# Patient Record
Sex: MALE | Race: WHITE | Employment: FULL TIME | URBAN - METROPOLITAN AREA
[De-identification: names, ages, dates, MRNs, and addresses within clinical notes are randomized per-mention and may not be internally consistent; named-entity substitution may affect disease eponyms.]

---

## 2019-05-14 ENCOUNTER — HOSPITAL ENCOUNTER (EMERGENCY)
Facility: HOSPITAL | Age: 54
Discharge: HOME | End: 2019-05-14
Attending: EMERGENCY MEDICINE
Payer: COMMERCIAL

## 2019-05-14 ENCOUNTER — APPOINTMENT (OUTPATIENT)
Dept: CT IMAGING | Facility: HOSPITAL | Age: 54
End: 2019-05-14
Attending: EMERGENCY MEDICINE
Payer: COMMERCIAL

## 2019-05-14 VITALS
HEIGHT: 72 IN | RESPIRATION RATE: 16 BRPM | BODY MASS INDEX: 31.83 KG/M2 | TEMPERATURE: 98 F | WEIGHT: 235 LBS | DIASTOLIC BLOOD PRESSURE: 86 MMHG | SYSTOLIC BLOOD PRESSURE: 145 MMHG | OXYGEN SATURATION: 98 % | HEART RATE: 91 BPM

## 2019-05-14 DIAGNOSIS — R73.9 HYPERGLYCEMIA: ICD-10-CM

## 2019-05-14 DIAGNOSIS — N49.2 CELLULITIS OF SCROTUM: Primary | ICD-10-CM

## 2019-05-14 PROCEDURE — 87147 CULTURE TYPE IMMUNOLOGIC: CPT | Performed by: EMERGENCY MEDICINE

## 2019-05-14 PROCEDURE — 96361 HYDRATE IV INFUSION ADD-ON: CPT

## 2019-05-14 PROCEDURE — 99284 EMERGENCY DEPT VISIT MOD MDM: CPT

## 2019-05-14 PROCEDURE — 85025 COMPLETE CBC W/AUTO DIFF WBC: CPT | Performed by: EMERGENCY MEDICINE

## 2019-05-14 PROCEDURE — 87070 CULTURE OTHR SPECIMN AEROBIC: CPT | Performed by: EMERGENCY MEDICINE

## 2019-05-14 PROCEDURE — 72193 CT PELVIS W/DYE: CPT | Performed by: EMERGENCY MEDICINE

## 2019-05-14 PROCEDURE — 80053 COMPREHEN METABOLIC PANEL: CPT | Performed by: EMERGENCY MEDICINE

## 2019-05-14 PROCEDURE — 87205 SMEAR GRAM STAIN: CPT | Performed by: EMERGENCY MEDICINE

## 2019-05-14 PROCEDURE — 87040 BLOOD CULTURE FOR BACTERIA: CPT | Performed by: EMERGENCY MEDICINE

## 2019-05-14 PROCEDURE — 82962 GLUCOSE BLOOD TEST: CPT

## 2019-05-14 PROCEDURE — 96365 THER/PROPH/DIAG IV INF INIT: CPT

## 2019-05-14 RX ORDER — AMOXICILLIN AND CLAVULANATE POTASSIUM 875; 125 MG/1; MG/1
1 TABLET, FILM COATED ORAL 2 TIMES DAILY
Qty: 14 TABLET | Refills: 0 | Status: SHIPPED | OUTPATIENT
Start: 2019-05-14 | End: 2019-05-21

## 2019-05-14 NOTE — ED NOTES
DC instructions handed to pt with Rxs. No distress noted. Pt denies any needs. Pt thanks staff for care. Denies need for wC out of ER   No signs of allergic reaction noted.

## 2019-05-14 NOTE — ED INITIAL ASSESSMENT (HPI)
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 \" Pt denies abnormal penile discharge, denies urinary sxs

## 2019-05-14 NOTE — ED PROVIDER NOTES
Patient Seen in: [facility] Emergency Department    History   Patient presents with:  Eval-G (genital)    Stated Complaint:     HPI    Patient is a 49-year-old male with a history of diabetes, noncompliant with seeing a regular physician or taking m rhythm and normal heart sounds. Pulmonary/Chest: Effort normal and breath sounds normal.   Abdominal: Soft. Bowel sounds are normal.   Genitourinary:   Genitourinary Comments:  There is some mild erythema the right groin and on the right lateral side of t needed, consider ultrasound follow-up. There may be small right hydrocele and there appears to be thickening of the right scrotal skin.    Dictated by: Josse San MD on 5/14/2019 at 8:37     Approved by: Josse San MD                MDM   Patient daily with meals. Do not start taking this medication until Friday morning.   Qty: 60 tablet Refills: 2

## 2019-05-15 ENCOUNTER — TELEPHONE (OUTPATIENT)
Dept: FAMILY MEDICINE CLINIC | Facility: CLINIC | Age: 54
End: 2019-05-15

## 2019-07-24 PROBLEM — Z12.11 SPECIAL SCREENING FOR MALIGNANT NEOPLASM OF COLON: Status: ACTIVE | Noted: 2019-07-24

## 2019-07-25 PROBLEM — K63.5 POLYP OF COLON: Status: ACTIVE | Noted: 2019-07-25

## 2021-11-08 ENCOUNTER — DIABETIC EDUCATION (OUTPATIENT)
Dept: ENDOCRINOLOGY CLINIC | Facility: CLINIC | Age: 56
End: 2021-11-08
Payer: COMMERCIAL

## 2021-11-08 DIAGNOSIS — E11.65 TYPE 2 DIABETES MELLITUS WITH HYPERGLYCEMIA, WITHOUT LONG-TERM CURRENT USE OF INSULIN (HCC): Primary | ICD-10-CM

## 2021-11-08 PROCEDURE — 97802 MEDICAL NUTRITION INDIV IN: CPT | Performed by: DIETITIAN, REGISTERED

## 2021-11-08 NOTE — PROGRESS NOTES
Markus Umana   6/13/1965 was seen for Medical Nutrition Therapy with Diabetes:    11/8/2021  Referring Provider: Dr. Sony Peace  Start time: 3:15 End time: 4:15    Here with wife Aspen Perales. Using Andrew. BG was 292 when he saw pcp in office.     He thinks his self-management goals: reduce portion sizes (already working on this). Patient is willing to make lifestyle changes to improve blood glucose control. Written materials provided for all topics covered.   Patient verbalized understanding and has no furt

## 2023-05-06 ENCOUNTER — HOSPITAL ENCOUNTER (EMERGENCY)
Facility: HOSPITAL | Age: 58
Discharge: HOME | End: 2023-05-06
Attending: STUDENT IN AN ORGANIZED HEALTH CARE EDUCATION/TRAINING PROGRAM
Payer: COMMERCIAL

## 2023-05-06 ENCOUNTER — APPOINTMENT (OUTPATIENT)
Dept: ULTRASOUND IMAGING | Facility: HOSPITAL | Age: 58
End: 2023-05-06
Attending: STUDENT IN AN ORGANIZED HEALTH CARE EDUCATION/TRAINING PROGRAM
Payer: COMMERCIAL

## 2023-05-06 VITALS
OXYGEN SATURATION: 100 % | BODY MASS INDEX: 30.48 KG/M2 | TEMPERATURE: 99 F | DIASTOLIC BLOOD PRESSURE: 81 MMHG | HEART RATE: 78 BPM | HEIGHT: 72 IN | WEIGHT: 225 LBS | SYSTOLIC BLOOD PRESSURE: 147 MMHG | RESPIRATION RATE: 18 BRPM

## 2023-05-06 DIAGNOSIS — L03.116 CELLULITIS OF LEFT LOWER EXTREMITY: Primary | ICD-10-CM

## 2023-05-06 LAB
ALBUMIN SERPL-MCNC: 3.1 G/DL (ref 3.4–5)
ALBUMIN/GLOB SERPL: 0.7 {RATIO} (ref 1–2)
ALP LIVER SERPL-CCNC: 70 U/L
ALT SERPL-CCNC: 34 U/L
ANION GAP SERPL CALC-SCNC: 3 MMOL/L (ref 0–18)
AST SERPL-CCNC: 22 U/L (ref 15–37)
BASOPHILS # BLD AUTO: 0.02 X10(3) UL (ref 0–0.2)
BASOPHILS NFR BLD AUTO: 0.3 %
BILIRUB SERPL-MCNC: 0.7 MG/DL (ref 0.1–2)
BUN BLD-MCNC: 36 MG/DL (ref 7–18)
CALCIUM BLD-MCNC: 8.8 MG/DL (ref 8.5–10.1)
CHLORIDE SERPL-SCNC: 106 MMOL/L (ref 98–112)
CO2 SERPL-SCNC: 27 MMOL/L (ref 21–32)
CREAT BLD-MCNC: 1.61 MG/DL
EOSINOPHIL # BLD AUTO: 0.02 X10(3) UL (ref 0–0.7)
EOSINOPHIL NFR BLD AUTO: 0.3 %
ERYTHROCYTE [DISTWIDTH] IN BLOOD BY AUTOMATED COUNT: 13.1 %
GFR SERPLBLD BASED ON 1.73 SQ M-ARVRAT: 50 ML/MIN/1.73M2 (ref 60–?)
GLOBULIN PLAS-MCNC: 4.3 G/DL (ref 2.8–4.4)
GLUCOSE BLD-MCNC: 180 MG/DL (ref 70–99)
HCT VFR BLD AUTO: 33.7 %
HGB BLD-MCNC: 10.9 G/DL
IMM GRANULOCYTES # BLD AUTO: 0.03 X10(3) UL (ref 0–1)
IMM GRANULOCYTES NFR BLD: 0.4 %
LYMPHOCYTES # BLD AUTO: 0.79 X10(3) UL (ref 1–4)
LYMPHOCYTES NFR BLD AUTO: 11.2 %
MCH RBC QN AUTO: 29.1 PG (ref 26–34)
MCHC RBC AUTO-ENTMCNC: 32.3 G/DL (ref 31–37)
MCV RBC AUTO: 90.1 FL
MONOCYTES # BLD AUTO: 0.34 X10(3) UL (ref 0.1–1)
MONOCYTES NFR BLD AUTO: 4.8 %
NEUTROPHILS # BLD AUTO: 5.84 X10 (3) UL (ref 1.5–7.7)
NEUTROPHILS # BLD AUTO: 5.84 X10(3) UL (ref 1.5–7.7)
NEUTROPHILS NFR BLD AUTO: 83 %
OSMOLALITY SERPL CALC.SUM OF ELEC: 295 MOSM/KG (ref 275–295)
PLATELET # BLD AUTO: 123 10(3)UL (ref 150–450)
POTASSIUM SERPL-SCNC: 4 MMOL/L (ref 3.5–5.1)
PROT SERPL-MCNC: 7.4 G/DL (ref 6.4–8.2)
RBC # BLD AUTO: 3.74 X10(6)UL
SODIUM SERPL-SCNC: 136 MMOL/L (ref 136–145)
WBC # BLD AUTO: 7 X10(3) UL (ref 4–11)

## 2023-05-06 PROCEDURE — 80053 COMPREHEN METABOLIC PANEL: CPT

## 2023-05-06 PROCEDURE — 85025 COMPLETE CBC W/AUTO DIFF WBC: CPT

## 2023-05-06 PROCEDURE — 36415 COLL VENOUS BLD VENIPUNCTURE: CPT

## 2023-05-06 PROCEDURE — 99285 EMERGENCY DEPT VISIT HI MDM: CPT

## 2023-05-06 PROCEDURE — 87040 BLOOD CULTURE FOR BACTERIA: CPT

## 2023-05-06 PROCEDURE — 96365 THER/PROPH/DIAG IV INF INIT: CPT

## 2023-05-06 PROCEDURE — S0077 INJECTION, CLINDAMYCIN PHOSP: HCPCS | Performed by: STUDENT IN AN ORGANIZED HEALTH CARE EDUCATION/TRAINING PROGRAM

## 2023-05-06 PROCEDURE — 93971 EXTREMITY STUDY: CPT | Performed by: STUDENT IN AN ORGANIZED HEALTH CARE EDUCATION/TRAINING PROGRAM

## 2023-05-06 PROCEDURE — 99284 EMERGENCY DEPT VISIT MOD MDM: CPT

## 2023-05-06 RX ORDER — MELOXICAM 15 MG/1
15 TABLET ORAL DAILY
COMMUNITY
Start: 2023-05-01

## 2023-05-06 RX ORDER — VALSARTAN 160 MG/1
320 TABLET ORAL DAILY
COMMUNITY
Start: 2023-03-03

## 2023-05-06 RX ORDER — CLINDAMYCIN HYDROCHLORIDE 150 MG/1
450 CAPSULE ORAL 3 TIMES DAILY
Qty: 30 CAPSULE | Refills: 0 | Status: SHIPPED | OUTPATIENT
Start: 2023-05-06 | End: 2023-05-13

## 2023-05-06 RX ORDER — ONDANSETRON 4 MG/1
2 TABLET, FILM COATED ORAL EVERY 8 HOURS PRN
COMMUNITY
Start: 2023-05-05 | End: 2023-05-08

## 2023-05-06 RX ORDER — ROSUVASTATIN CALCIUM 20 MG/1
20 TABLET, COATED ORAL NIGHTLY
COMMUNITY

## 2023-05-06 RX ORDER — CLINDAMYCIN PHOSPHATE 600 MG/50ML
600 INJECTION INTRAVENOUS ONCE
Status: COMPLETED | OUTPATIENT
Start: 2023-05-06 | End: 2023-05-06

## 2023-05-06 NOTE — ED INITIAL ASSESSMENT (HPI)
Patient arrives to the ED from home with c/o left lower leg cellulitis that he first noticed today. Per patient it has been rapidly getting worse since he first noticed it. Has been sick for the last two days, and \"they weren't able to find anything\".

## 2023-06-23 ENCOUNTER — APPOINTMENT (OUTPATIENT)
Dept: GENERAL RADIOLOGY | Facility: HOSPITAL | Age: 58
End: 2023-06-23
Attending: EMERGENCY MEDICINE
Payer: COMMERCIAL

## 2023-06-23 ENCOUNTER — HOSPITAL ENCOUNTER (INPATIENT)
Facility: HOSPITAL | Age: 58
LOS: 4 days | Discharge: HOME HEALTH | End: 2023-06-27
Attending: EMERGENCY MEDICINE | Admitting: HOSPITALIST
Payer: COMMERCIAL

## 2023-06-23 ENCOUNTER — APPOINTMENT (OUTPATIENT)
Dept: MRI IMAGING | Facility: HOSPITAL | Age: 58
End: 2023-06-23
Attending: HOSPITALIST
Payer: COMMERCIAL

## 2023-06-23 DIAGNOSIS — N18.9 CHRONIC KIDNEY DISEASE, UNSPECIFIED CKD STAGE: ICD-10-CM

## 2023-06-23 DIAGNOSIS — L97.528 DIABETIC ULCER OF TOE OF LEFT FOOT ASSOCIATED WITH TYPE 2 DIABETES MELLITUS, WITH OTHER ULCER SEVERITY (HCC): Primary | ICD-10-CM

## 2023-06-23 DIAGNOSIS — M86.9 OSTEOMYELITIS (HCC): ICD-10-CM

## 2023-06-23 DIAGNOSIS — E11.621 DIABETIC ULCER OF TOE OF LEFT FOOT ASSOCIATED WITH TYPE 2 DIABETES MELLITUS, WITH OTHER ULCER SEVERITY (HCC): Primary | ICD-10-CM

## 2023-06-23 PROBLEM — L97.509 DIABETIC FOOT ULCER (HCC): Status: ACTIVE | Noted: 2023-06-23

## 2023-06-23 LAB
ALBUMIN SERPL-MCNC: 3.2 G/DL (ref 3.4–5)
ALBUMIN/GLOB SERPL: 0.6 {RATIO} (ref 1–2)
ALP LIVER SERPL-CCNC: 96 U/L
ALT SERPL-CCNC: 19 U/L
ANION GAP SERPL CALC-SCNC: 1 MMOL/L (ref 0–18)
AST SERPL-CCNC: 16 U/L (ref 15–37)
BASOPHILS # BLD AUTO: 0.05 X10(3) UL (ref 0–0.2)
BASOPHILS NFR BLD AUTO: 0.6 %
BILIRUB SERPL-MCNC: 0.3 MG/DL (ref 0.1–2)
BUN BLD-MCNC: 43 MG/DL (ref 7–18)
CALCIUM BLD-MCNC: 9.4 MG/DL (ref 8.5–10.1)
CHLORIDE SERPL-SCNC: 112 MMOL/L (ref 98–112)
CO2 SERPL-SCNC: 24 MMOL/L (ref 21–32)
CREAT BLD-MCNC: 1.45 MG/DL
DEPRECATED HBV CORE AB SER IA-ACNC: 159.4 NG/ML
EOSINOPHIL # BLD AUTO: 0.09 X10(3) UL (ref 0–0.7)
EOSINOPHIL NFR BLD AUTO: 1.1 %
ERYTHROCYTE [DISTWIDTH] IN BLOOD BY AUTOMATED COUNT: 13.1 %
FOLATE SERPL-MCNC: 13.6 NG/ML (ref 8.7–?)
GFR SERPLBLD BASED ON 1.73 SQ M-ARVRAT: 56 ML/MIN/1.73M2 (ref 60–?)
GLOBULIN PLAS-MCNC: 5.2 G/DL (ref 2.8–4.4)
GLUCOSE BLD-MCNC: 101 MG/DL (ref 70–99)
GLUCOSE BLD-MCNC: 96 MG/DL (ref 70–99)
HCT VFR BLD AUTO: 35 %
HGB BLD-MCNC: 11 G/DL
IMM GRANULOCYTES # BLD AUTO: 0.03 X10(3) UL (ref 0–1)
IMM GRANULOCYTES NFR BLD: 0.4 %
IRON SATN MFR SERPL: 12 %
IRON SERPL-MCNC: 28 UG/DL
LACTATE SERPL-SCNC: 0.7 MMOL/L (ref 0.4–2)
LYMPHOCYTES # BLD AUTO: 1.68 X10(3) UL (ref 1–4)
LYMPHOCYTES NFR BLD AUTO: 20.2 %
MCH RBC QN AUTO: 28.1 PG (ref 26–34)
MCHC RBC AUTO-ENTMCNC: 31.4 G/DL (ref 31–37)
MCV RBC AUTO: 89.5 FL
MONOCYTES # BLD AUTO: 0.51 X10(3) UL (ref 0.1–1)
MONOCYTES NFR BLD AUTO: 6.1 %
NEUTROPHILS # BLD AUTO: 5.94 X10 (3) UL (ref 1.5–7.7)
NEUTROPHILS # BLD AUTO: 5.94 X10(3) UL (ref 1.5–7.7)
NEUTROPHILS NFR BLD AUTO: 71.6 %
OSMOLALITY SERPL CALC.SUM OF ELEC: 295 MOSM/KG (ref 275–295)
PLATELET # BLD AUTO: 179 10(3)UL (ref 150–450)
POTASSIUM SERPL-SCNC: 4.7 MMOL/L (ref 3.5–5.1)
PROT SERPL-MCNC: 8.4 G/DL (ref 6.4–8.2)
RBC # BLD AUTO: 3.91 X10(6)UL
SODIUM SERPL-SCNC: 137 MMOL/L (ref 136–145)
TIBC SERPL-MCNC: 235 UG/DL (ref 240–450)
TRANSFERRIN SERPL-MCNC: 158 MG/DL (ref 200–360)
VIT B12 SERPL-MCNC: 263 PG/ML (ref 193–986)
WBC # BLD AUTO: 8.3 X10(3) UL (ref 4–11)

## 2023-06-23 PROCEDURE — 99223 1ST HOSP IP/OBS HIGH 75: CPT | Performed by: HOSPITALIST

## 2023-06-23 PROCEDURE — 73720 MRI LWR EXTREMITY W/O&W/DYE: CPT | Performed by: HOSPITALIST

## 2023-06-23 PROCEDURE — 73630 X-RAY EXAM OF FOOT: CPT | Performed by: EMERGENCY MEDICINE

## 2023-06-23 RX ORDER — HYDROCODONE BITARTRATE AND ACETAMINOPHEN 5; 325 MG/1; MG/1
1 TABLET ORAL EVERY 4 HOURS PRN
Status: DISCONTINUED | OUTPATIENT
Start: 2023-06-23 | End: 2023-06-27

## 2023-06-23 RX ORDER — MELATONIN
3 NIGHTLY PRN
Status: DISCONTINUED | OUTPATIENT
Start: 2023-06-23 | End: 2023-06-27

## 2023-06-23 RX ORDER — ECHINACEA PURPUREA EXTRACT 125 MG
1 TABLET ORAL
Status: DISCONTINUED | OUTPATIENT
Start: 2023-06-23 | End: 2023-06-27

## 2023-06-23 RX ORDER — NICOTINE POLACRILEX 4 MG
15 LOZENGE BUCCAL
Status: DISCONTINUED | OUTPATIENT
Start: 2023-06-23 | End: 2023-06-27

## 2023-06-23 RX ORDER — SENNOSIDES 8.6 MG
17.2 TABLET ORAL NIGHTLY PRN
Status: DISCONTINUED | OUTPATIENT
Start: 2023-06-23 | End: 2023-06-27

## 2023-06-23 RX ORDER — POLYETHYLENE GLYCOL 3350 17 G/17G
17 POWDER, FOR SOLUTION ORAL DAILY PRN
Status: DISCONTINUED | OUTPATIENT
Start: 2023-06-23 | End: 2023-06-27

## 2023-06-23 RX ORDER — SODIUM CHLORIDE 9 MG/ML
INJECTION, SOLUTION INTRAVENOUS CONTINUOUS
Status: DISCONTINUED | OUTPATIENT
Start: 2023-06-23 | End: 2023-06-27

## 2023-06-23 RX ORDER — BENZONATATE 200 MG/1
200 CAPSULE ORAL 3 TIMES DAILY PRN
Status: DISCONTINUED | OUTPATIENT
Start: 2023-06-23 | End: 2023-06-27

## 2023-06-23 RX ORDER — ACETAMINOPHEN 500 MG
500 TABLET ORAL EVERY 4 HOURS PRN
Status: DISCONTINUED | OUTPATIENT
Start: 2023-06-23 | End: 2023-06-27

## 2023-06-23 RX ORDER — HEPARIN SODIUM 5000 [USP'U]/ML
5000 INJECTION, SOLUTION INTRAVENOUS; SUBCUTANEOUS EVERY 12 HOURS SCHEDULED
Status: DISCONTINUED | OUTPATIENT
Start: 2023-06-24 | End: 2023-06-26

## 2023-06-23 RX ORDER — ACETAMINOPHEN 325 MG/1
650 TABLET ORAL EVERY 4 HOURS PRN
Status: DISCONTINUED | OUTPATIENT
Start: 2023-06-23 | End: 2023-06-27

## 2023-06-23 RX ORDER — NICOTINE POLACRILEX 4 MG
30 LOZENGE BUCCAL
Status: DISCONTINUED | OUTPATIENT
Start: 2023-06-23 | End: 2023-06-27

## 2023-06-23 RX ORDER — HYDROCODONE BITARTRATE AND ACETAMINOPHEN 5; 325 MG/1; MG/1
2 TABLET ORAL EVERY 4 HOURS PRN
Status: DISCONTINUED | OUTPATIENT
Start: 2023-06-23 | End: 2023-06-27

## 2023-06-23 RX ORDER — ONDANSETRON 2 MG/ML
8 INJECTION INTRAMUSCULAR; INTRAVENOUS EVERY 6 HOURS PRN
Status: DISCONTINUED | OUTPATIENT
Start: 2023-06-23 | End: 2023-06-27

## 2023-06-23 RX ORDER — BISACODYL 10 MG
10 SUPPOSITORY, RECTAL RECTAL
Status: DISCONTINUED | OUTPATIENT
Start: 2023-06-23 | End: 2023-06-27

## 2023-06-23 RX ORDER — GADOTERATE MEGLUMINE 376.9 MG/ML
20 INJECTION INTRAVENOUS
Status: COMPLETED | OUTPATIENT
Start: 2023-06-23 | End: 2023-06-23

## 2023-06-23 RX ORDER — ROSUVASTATIN CALCIUM 5 MG/1
20 TABLET, COATED ORAL NIGHTLY
Status: DISCONTINUED | OUTPATIENT
Start: 2023-06-23 | End: 2023-06-27

## 2023-06-23 RX ORDER — DEXTROSE MONOHYDRATE 25 G/50ML
50 INJECTION, SOLUTION INTRAVENOUS
Status: DISCONTINUED | OUTPATIENT
Start: 2023-06-23 | End: 2023-06-27

## 2023-06-24 LAB
ANION GAP SERPL CALC-SCNC: 3 MMOL/L (ref 0–18)
BUN BLD-MCNC: 29 MG/DL (ref 7–18)
CALCIUM BLD-MCNC: 8.4 MG/DL (ref 8.5–10.1)
CHLORIDE SERPL-SCNC: 116 MMOL/L (ref 98–112)
CO2 SERPL-SCNC: 23 MMOL/L (ref 21–32)
CREAT BLD-MCNC: 1.12 MG/DL
ERYTHROCYTE [DISTWIDTH] IN BLOOD BY AUTOMATED COUNT: 13.1 %
EST. AVERAGE GLUCOSE BLD GHB EST-MCNC: 174 MG/DL (ref 68–126)
GFR SERPLBLD BASED ON 1.73 SQ M-ARVRAT: 76 ML/MIN/1.73M2 (ref 60–?)
GLUCOSE BLD-MCNC: 103 MG/DL (ref 70–99)
GLUCOSE BLD-MCNC: 113 MG/DL (ref 70–99)
GLUCOSE BLD-MCNC: 115 MG/DL (ref 70–99)
GLUCOSE BLD-MCNC: 137 MG/DL (ref 70–99)
GLUCOSE BLD-MCNC: 79 MG/DL (ref 70–99)
HBA1C MFR BLD: 7.7 % (ref ?–5.7)
HCT VFR BLD AUTO: 30.2 %
HGB BLD-MCNC: 9.4 G/DL
MAGNESIUM SERPL-MCNC: 2.6 MG/DL (ref 1.6–2.6)
MCH RBC QN AUTO: 28.3 PG (ref 26–34)
MCHC RBC AUTO-ENTMCNC: 31.1 G/DL (ref 31–37)
MCV RBC AUTO: 91 FL
OSMOLALITY SERPL CALC.SUM OF ELEC: 301 MOSM/KG (ref 275–295)
PLATELET # BLD AUTO: 148 10(3)UL (ref 150–450)
POTASSIUM SERPL-SCNC: 4.5 MMOL/L (ref 3.5–5.1)
RBC # BLD AUTO: 3.32 X10(6)UL
SODIUM SERPL-SCNC: 142 MMOL/L (ref 136–145)
WBC # BLD AUTO: 6.4 X10(3) UL (ref 4–11)

## 2023-06-24 PROCEDURE — 99233 SBSQ HOSP IP/OBS HIGH 50: CPT | Performed by: HOSPITALIST

## 2023-06-24 RX ORDER — LOSARTAN POTASSIUM 100 MG/1
100 TABLET ORAL DAILY
Status: DISCONTINUED | OUTPATIENT
Start: 2023-06-24 | End: 2023-06-27

## 2023-06-25 ENCOUNTER — APPOINTMENT (OUTPATIENT)
Dept: ULTRASOUND IMAGING | Facility: HOSPITAL | Age: 58
End: 2023-06-25
Attending: HOSPITALIST
Payer: COMMERCIAL

## 2023-06-25 ENCOUNTER — APPOINTMENT (OUTPATIENT)
Dept: CV DIAGNOSTICS | Facility: HOSPITAL | Age: 58
End: 2023-06-25
Attending: INTERNAL MEDICINE
Payer: COMMERCIAL

## 2023-06-25 ENCOUNTER — ANESTHESIA EVENT (OUTPATIENT)
Dept: SURGERY | Facility: HOSPITAL | Age: 58
End: 2023-06-25
Payer: COMMERCIAL

## 2023-06-25 LAB
ANION GAP SERPL CALC-SCNC: 6 MMOL/L (ref 0–18)
BUN BLD-MCNC: 19 MG/DL (ref 7–18)
CALCIUM BLD-MCNC: 8.2 MG/DL (ref 8.5–10.1)
CHLORIDE SERPL-SCNC: 114 MMOL/L (ref 98–112)
CO2 SERPL-SCNC: 23 MMOL/L (ref 21–32)
CREAT BLD-MCNC: 0.87 MG/DL
ERYTHROCYTE [DISTWIDTH] IN BLOOD BY AUTOMATED COUNT: 12.8 %
GFR SERPLBLD BASED ON 1.73 SQ M-ARVRAT: 100 ML/MIN/1.73M2 (ref 60–?)
GLUCOSE BLD-MCNC: 124 MG/DL (ref 70–99)
GLUCOSE BLD-MCNC: 153 MG/DL (ref 70–99)
GLUCOSE BLD-MCNC: 79 MG/DL (ref 70–99)
GLUCOSE BLD-MCNC: 88 MG/DL (ref 70–99)
GLUCOSE BLD-MCNC: 96 MG/DL (ref 70–99)
HCT VFR BLD AUTO: 30.2 %
HGB BLD-MCNC: 9.3 G/DL
MCH RBC QN AUTO: 28 PG (ref 26–34)
MCHC RBC AUTO-ENTMCNC: 30.8 G/DL (ref 31–37)
MCV RBC AUTO: 91 FL
OSMOLALITY SERPL CALC.SUM OF ELEC: 297 MOSM/KG (ref 275–295)
PLATELET # BLD AUTO: 157 10(3)UL (ref 150–450)
POTASSIUM SERPL-SCNC: 4.5 MMOL/L (ref 3.5–5.1)
RBC # BLD AUTO: 3.32 X10(6)UL
SODIUM SERPL-SCNC: 143 MMOL/L (ref 136–145)
WBC # BLD AUTO: 5.9 X10(3) UL (ref 4–11)

## 2023-06-25 PROCEDURE — 28820 AMPUTATION OF TOE: CPT | Performed by: PODIATRIST

## 2023-06-25 PROCEDURE — 93306 TTE W/DOPPLER COMPLETE: CPT | Performed by: INTERNAL MEDICINE

## 2023-06-25 PROCEDURE — 99233 SBSQ HOSP IP/OBS HIGH 50: CPT | Performed by: HOSPITALIST

## 2023-06-25 PROCEDURE — 93926 LOWER EXTREMITY STUDY: CPT | Performed by: HOSPITALIST

## 2023-06-26 ENCOUNTER — ANESTHESIA (OUTPATIENT)
Dept: SURGERY | Facility: HOSPITAL | Age: 58
End: 2023-06-26
Payer: COMMERCIAL

## 2023-06-26 ENCOUNTER — APPOINTMENT (OUTPATIENT)
Dept: GENERAL RADIOLOGY | Facility: HOSPITAL | Age: 58
End: 2023-06-26
Attending: PODIATRIST
Payer: COMMERCIAL

## 2023-06-26 LAB
GLUCOSE BLD-MCNC: 102 MG/DL (ref 70–99)
GLUCOSE BLD-MCNC: 80 MG/DL (ref 70–99)
GLUCOSE BLD-MCNC: 90 MG/DL (ref 70–99)
GLUCOSE BLD-MCNC: 91 MG/DL (ref 70–99)
GLUCOSE BLD-MCNC: 99 MG/DL (ref 70–99)

## 2023-06-26 PROCEDURE — 76000 FLUOROSCOPY <1 HR PHYS/QHP: CPT | Performed by: PODIATRIST

## 2023-06-26 PROCEDURE — 02HV33Z INSERTION OF INFUSION DEVICE INTO SUPERIOR VENA CAVA, PERCUTANEOUS APPROACH: ICD-10-PCS | Performed by: INTERNAL MEDICINE

## 2023-06-26 PROCEDURE — 99233 SBSQ HOSP IP/OBS HIGH 50: CPT | Performed by: HOSPITALIST

## 2023-06-26 PROCEDURE — 0Y6S0Z0 DETACHMENT AT LEFT 2ND TOE, COMPLETE, OPEN APPROACH: ICD-10-PCS | Performed by: PODIATRIST

## 2023-06-26 RX ORDER — CEFAZOLIN SODIUM 1 G/3ML
INJECTION, POWDER, FOR SOLUTION INTRAMUSCULAR; INTRAVENOUS AS NEEDED
Status: DISCONTINUED | OUTPATIENT
Start: 2023-06-26 | End: 2023-06-26 | Stop reason: SURG

## 2023-06-26 RX ORDER — HYDROMORPHONE HYDROCHLORIDE 1 MG/ML
0.2 INJECTION, SOLUTION INTRAMUSCULAR; INTRAVENOUS; SUBCUTANEOUS EVERY 5 MIN PRN
Status: DISCONTINUED | OUTPATIENT
Start: 2023-06-26 | End: 2023-06-26 | Stop reason: HOSPADM

## 2023-06-26 RX ORDER — SODIUM CHLORIDE, SODIUM LACTATE, POTASSIUM CHLORIDE, CALCIUM CHLORIDE 600; 310; 30; 20 MG/100ML; MG/100ML; MG/100ML; MG/100ML
INJECTION, SOLUTION INTRAVENOUS CONTINUOUS
Status: DISCONTINUED | OUTPATIENT
Start: 2023-06-26 | End: 2023-06-26 | Stop reason: HOSPADM

## 2023-06-26 RX ORDER — ACETAMINOPHEN 500 MG
1000 TABLET ORAL ONCE AS NEEDED
Status: DISCONTINUED | OUTPATIENT
Start: 2023-06-26 | End: 2023-06-26 | Stop reason: HOSPADM

## 2023-06-26 RX ORDER — HYDROMORPHONE HYDROCHLORIDE 1 MG/ML
0.6 INJECTION, SOLUTION INTRAMUSCULAR; INTRAVENOUS; SUBCUTANEOUS EVERY 5 MIN PRN
Status: DISCONTINUED | OUTPATIENT
Start: 2023-06-26 | End: 2023-06-26 | Stop reason: HOSPADM

## 2023-06-26 RX ORDER — BUPIVACAINE HYDROCHLORIDE 5 MG/ML
INJECTION, SOLUTION EPIDURAL; INTRACAUDAL AS NEEDED
Status: DISCONTINUED | OUTPATIENT
Start: 2023-06-26 | End: 2023-06-26 | Stop reason: HOSPADM

## 2023-06-26 RX ORDER — SODIUM CHLORIDE, SODIUM LACTATE, POTASSIUM CHLORIDE, CALCIUM CHLORIDE 600; 310; 30; 20 MG/100ML; MG/100ML; MG/100ML; MG/100ML
INJECTION, SOLUTION INTRAVENOUS CONTINUOUS PRN
Status: DISCONTINUED | OUTPATIENT
Start: 2023-06-26 | End: 2023-06-26 | Stop reason: SURG

## 2023-06-26 RX ORDER — ONDANSETRON 2 MG/ML
4 INJECTION INTRAMUSCULAR; INTRAVENOUS EVERY 6 HOURS PRN
Status: DISCONTINUED | OUTPATIENT
Start: 2023-06-26 | End: 2023-06-26 | Stop reason: HOSPADM

## 2023-06-26 RX ORDER — INSULIN ASPART 100 [IU]/ML
INJECTION, SOLUTION INTRAVENOUS; SUBCUTANEOUS ONCE
Status: DISCONTINUED | OUTPATIENT
Start: 2023-06-26 | End: 2023-06-26 | Stop reason: HOSPADM

## 2023-06-26 RX ORDER — HYDROCODONE BITARTRATE AND ACETAMINOPHEN 5; 325 MG/1; MG/1
1 TABLET ORAL ONCE AS NEEDED
Status: DISCONTINUED | OUTPATIENT
Start: 2023-06-26 | End: 2023-06-26 | Stop reason: HOSPADM

## 2023-06-26 RX ORDER — HYDROMORPHONE HYDROCHLORIDE 1 MG/ML
0.4 INJECTION, SOLUTION INTRAMUSCULAR; INTRAVENOUS; SUBCUTANEOUS EVERY 5 MIN PRN
Status: DISCONTINUED | OUTPATIENT
Start: 2023-06-26 | End: 2023-06-26 | Stop reason: HOSPADM

## 2023-06-26 RX ORDER — PROCHLORPERAZINE EDISYLATE 5 MG/ML
5 INJECTION INTRAMUSCULAR; INTRAVENOUS EVERY 8 HOURS PRN
Status: DISCONTINUED | OUTPATIENT
Start: 2023-06-26 | End: 2023-06-26 | Stop reason: HOSPADM

## 2023-06-26 RX ORDER — NALOXONE HYDROCHLORIDE 0.4 MG/ML
80 INJECTION, SOLUTION INTRAMUSCULAR; INTRAVENOUS; SUBCUTANEOUS AS NEEDED
Status: DISCONTINUED | OUTPATIENT
Start: 2023-06-26 | End: 2023-06-26 | Stop reason: HOSPADM

## 2023-06-26 RX ORDER — MIDAZOLAM HYDROCHLORIDE 1 MG/ML
INJECTION INTRAMUSCULAR; INTRAVENOUS AS NEEDED
Status: DISCONTINUED | OUTPATIENT
Start: 2023-06-26 | End: 2023-06-26 | Stop reason: SURG

## 2023-06-26 RX ORDER — LIDOCAINE HYDROCHLORIDE 10 MG/ML
INJECTION, SOLUTION EPIDURAL; INFILTRATION; INTRACAUDAL; PERINEURAL AS NEEDED
Status: DISCONTINUED | OUTPATIENT
Start: 2023-06-26 | End: 2023-06-26 | Stop reason: SURG

## 2023-06-26 RX ORDER — HYDROCODONE BITARTRATE AND ACETAMINOPHEN 5; 325 MG/1; MG/1
2 TABLET ORAL ONCE AS NEEDED
Status: DISCONTINUED | OUTPATIENT
Start: 2023-06-26 | End: 2023-06-26 | Stop reason: HOSPADM

## 2023-06-26 RX ORDER — HYDRALAZINE HYDROCHLORIDE 20 MG/ML
10 INJECTION INTRAMUSCULAR; INTRAVENOUS EVERY 6 HOURS PRN
Status: DISCONTINUED | OUTPATIENT
Start: 2023-06-26 | End: 2023-06-27

## 2023-06-26 RX ADMIN — CEFAZOLIN SODIUM 2 G: 1 INJECTION, POWDER, FOR SOLUTION INTRAMUSCULAR; INTRAVENOUS at 12:08:00

## 2023-06-26 RX ADMIN — SODIUM CHLORIDE, SODIUM LACTATE, POTASSIUM CHLORIDE, CALCIUM CHLORIDE: 600; 310; 30; 20 INJECTION, SOLUTION INTRAVENOUS at 12:08:00

## 2023-06-26 RX ADMIN — MIDAZOLAM HYDROCHLORIDE 2 MG: 1 INJECTION INTRAMUSCULAR; INTRAVENOUS at 12:08:00

## 2023-06-26 RX ADMIN — LIDOCAINE HYDROCHLORIDE 50 MG: 10 INJECTION, SOLUTION EPIDURAL; INFILTRATION; INTRACAUDAL; PERINEURAL at 12:12:00

## 2023-06-27 ENCOUNTER — TELEPHONE (OUTPATIENT)
Dept: PODIATRY CLINIC | Facility: CLINIC | Age: 58
End: 2023-06-27

## 2023-06-27 VITALS
SYSTOLIC BLOOD PRESSURE: 201 MMHG | RESPIRATION RATE: 18 BRPM | HEART RATE: 88 BPM | DIASTOLIC BLOOD PRESSURE: 93 MMHG | BODY MASS INDEX: 31 KG/M2 | OXYGEN SATURATION: 95 % | WEIGHT: 226 LBS | TEMPERATURE: 98 F

## 2023-06-27 PROBLEM — L97.528 DIABETIC ULCER OF TOE OF LEFT FOOT ASSOCIATED WITH TYPE 2 DIABETES MELLITUS, WITH OTHER ULCER SEVERITY (HCC): Status: RESOLVED | Noted: 2023-06-23 | Resolved: 2023-06-27

## 2023-06-27 PROBLEM — E11.621 DIABETIC ULCER OF TOE OF LEFT FOOT ASSOCIATED WITH TYPE 2 DIABETES MELLITUS, WITH OTHER ULCER SEVERITY (HCC): Status: RESOLVED | Noted: 2023-06-23 | Resolved: 2023-06-27

## 2023-06-27 LAB
ANION GAP SERPL CALC-SCNC: 5 MMOL/L (ref 0–18)
BUN BLD-MCNC: 13 MG/DL (ref 7–18)
CALCIUM BLD-MCNC: 8.4 MG/DL (ref 8.5–10.1)
CHLORIDE SERPL-SCNC: 113 MMOL/L (ref 98–112)
CO2 SERPL-SCNC: 23 MMOL/L (ref 21–32)
CREAT BLD-MCNC: 0.83 MG/DL
ERYTHROCYTE [DISTWIDTH] IN BLOOD BY AUTOMATED COUNT: 12.6 %
GFR SERPLBLD BASED ON 1.73 SQ M-ARVRAT: 101 ML/MIN/1.73M2 (ref 60–?)
GLUCOSE BLD-MCNC: 146 MG/DL (ref 70–99)
GLUCOSE BLD-MCNC: 84 MG/DL (ref 70–99)
GLUCOSE BLD-MCNC: 85 MG/DL (ref 70–99)
GLUCOSE BLD-MCNC: 97 MG/DL (ref 70–99)
GP B STREP DNA BLD POS QL NAA+NON-PROBE: DETECTED
HCT VFR BLD AUTO: 30 %
HGB BLD-MCNC: 9.6 G/DL
MCH RBC QN AUTO: 28.4 PG (ref 26–34)
MCHC RBC AUTO-ENTMCNC: 32 G/DL (ref 31–37)
MCV RBC AUTO: 88.8 FL
OSMOLALITY SERPL CALC.SUM OF ELEC: 291 MOSM/KG (ref 275–295)
PLATELET # BLD AUTO: 168 10(3)UL (ref 150–450)
POTASSIUM SERPL-SCNC: 4.1 MMOL/L (ref 3.5–5.1)
RBC # BLD AUTO: 3.38 X10(6)UL
SODIUM SERPL-SCNC: 141 MMOL/L (ref 136–145)
WBC # BLD AUTO: 6.9 X10(3) UL (ref 4–11)

## 2023-06-27 PROCEDURE — 99239 HOSP IP/OBS DSCHRG MGMT >30: CPT | Performed by: INTERNAL MEDICINE

## 2023-06-27 RX ORDER — CEFTRIAXONE SODIUM 2 G/50ML
2 INJECTION, SOLUTION INTRAVENOUS EVERY 24 HOURS
Qty: 650 ML | Refills: 0 | Status: SHIPPED | OUTPATIENT
Start: 2023-06-27 | End: 2023-07-10

## 2023-06-27 RX ORDER — AMLODIPINE BESYLATE 10 MG/1
10 TABLET ORAL DAILY
Qty: 30 TABLET | Refills: 0 | Status: SHIPPED | OUTPATIENT
Start: 2023-06-28 | End: 2023-07-28

## 2023-06-27 RX ORDER — LABETALOL HYDROCHLORIDE 5 MG/ML
10 INJECTION, SOLUTION INTRAVENOUS ONCE
Status: COMPLETED | OUTPATIENT
Start: 2023-06-27 | End: 2023-06-27

## 2023-06-27 RX ORDER — METRONIDAZOLE 500 MG/1
500 TABLET ORAL 3 TIMES DAILY
Qty: 12 TABLET | Refills: 0 | Status: SHIPPED | OUTPATIENT
Start: 2023-06-27 | End: 2023-07-01

## 2023-06-27 RX ORDER — AMLODIPINE BESYLATE 5 MG/1
10 TABLET ORAL DAILY
Qty: 30 TABLET | Refills: 0 | Status: SHIPPED | OUTPATIENT
Start: 2023-06-28 | End: 2023-06-27

## 2023-06-27 RX ORDER — AMLODIPINE BESYLATE 5 MG/1
5 TABLET ORAL ONCE
Status: COMPLETED | OUTPATIENT
Start: 2023-06-27 | End: 2023-06-27

## 2023-06-27 RX ORDER — AMLODIPINE BESYLATE 5 MG/1
5 TABLET ORAL DAILY
Status: DISCONTINUED | OUTPATIENT
Start: 2023-06-27 | End: 2023-06-27

## 2023-07-02 ENCOUNTER — TELEPHONE (OUTPATIENT)
Dept: PODIATRY CLINIC | Facility: CLINIC | Age: 58
End: 2023-07-02

## 2023-07-02 NOTE — TELEPHONE ENCOUNTER
Fmla/rtw received in forms dept. Logged for processing. Sent [employer] message for missing auth.

## 2023-07-03 ENCOUNTER — OFFICE VISIT (OUTPATIENT)
Dept: PODIATRY CLINIC | Facility: CLINIC | Age: 58
End: 2023-07-03

## 2023-07-03 DIAGNOSIS — Z98.890 STATUS POST FOOT SURGERY: Primary | ICD-10-CM

## 2023-07-03 PROCEDURE — 99212 OFFICE O/P EST SF 10 MIN: CPT | Performed by: PODIATRIST

## 2023-07-06 NOTE — TELEPHONE ENCOUNTER
Spoke with pt's spouse Saumya [place]ung[place] (ok per verbal). Spouse advised forms were received in the forms dept. Spouse advised the forms are processed in the order they are received. Spouse given turnaround time of 7-15 business day. Spouse verbalized understanding.

## 2023-07-10 ENCOUNTER — OFFICE VISIT (OUTPATIENT)
Dept: PODIATRY CLINIC | Facility: CLINIC | Age: 58
End: 2023-07-10

## 2023-07-10 DIAGNOSIS — Z98.890 STATUS POST FOOT SURGERY: Primary | ICD-10-CM

## 2023-07-10 PROCEDURE — 99212 OFFICE O/P EST SF 10 MIN: CPT | Performed by: PODIATRIST

## 2023-07-11 NOTE — TELEPHONE ENCOUNTER
Dr. Gabriela Perera,      Please sign off on 2 forms if you agree to: FMLA and short term disability due to pt's left toe amputation, 6/23/23- pending re eval 7/17/23  (Please place your signature on the first page only)    -From your Inbasket, Highlight the patient and click Chart   -Double click the 0/4/92 Forms Completion telephone encounter  -Edin Montalvo down to the Media section   -Click the blue Hyperlink: Dillan Perera 7/11/23, MARI Perera [birth year]  -Click Acknowledge located at the bottom right corner (if you do not see acknowledge, try maximizing your window)   -Drag the mouse into the blank space of the document and a + sign will appear. Left click to   electronically sign the document.      Thank you,    Evelyn Jeronimo

## 2023-07-12 NOTE — TELEPHONE ENCOUNTER
MARI completed and faxed to [address] [place] [address] [phone]  Disab completed and faxed to [address].  Sent pt [employer] message

## 2023-07-12 NOTE — PROGRESS NOTES
Jessica Kumar is a 62year old male. Patient presents with:  Post-Op: Post op left foot- Sx 6/26. Patient denies any pain, numbness or tingling. HPI:   This patient is now 2 weeks status post surgery doing well has no complaints. At today's visit reviewed nurse's history as taken above, allergies medications and medical history as documented below. All changes duly noted  Allergies: Patient has no known allergies. Current Outpatient Medications   Medication Sig Dispense Refill    amLODIPine 10 MG Oral Tab Take 1 tablet (10 mg total) by mouth daily. 30 tablet 0    dapagliflozin (FARXIGA) 10 MG Oral Tab Take 15 mg by mouth daily. Meloxicam 15 MG Oral Tab Take 1 tablet (15 mg total) by mouth daily. valsartan 160 MG Oral Tab Take 2 tablets (320 mg total) by mouth daily. rosuvastatin 20 MG Oral Tab Take 1 tablet (20 mg total) by mouth nightly. PEG 3350-KCl-NaBcb-NaCl-NaSulf (PEG 3350/ELECTROLYTES) 240 g Oral Recon Soln Take as directed by your physician. 1 Bottle 0      Past Medical History:   Diagnosis Date    Abdominal hernia     High cholesterol     Other and unspecified hyperlipidemia     Type II or unspecified type diabetes mellitus without mention of complication, not stated as uncontrolled     Unspecified sleep apnea       Past Surgical History:   Procedure Laterality Date    HERNIA SURGERY      SHOULDER SURG PROC UNLISTED        History reviewed. No pertinent family history. Social History    Socioeconomic History      Marital status:     Tobacco Use      Smoking status: Former        Types: Cigars      Smokeless tobacco: Never    Vaping Use      Vaping Use: Never used    Substance and Sexual Activity      Alcohol use:  Yes        Alcohol/week: 2.0 standard drinks of alcohol        Types: 2 Cans of beer per week      Drug use: No          REVIEW OF SYSTEMS:   Today reviewed systens as documented below  GENERAL HEALTH: feels well otherwise  SKIN: Refer to exam below  RESPIRATORY: denies shortness of breath with exertion  CARDIOVASCULAR: denies chest pain on exertion  GI: denies abdominal pain and denies heartburn  NEURO: denies headaches    EXAM:   There were no vitals taken for this visit. GENERAL: well developed, well nourished, in no apparent distress  EXTREMITIES:   Dressing change shows incision line well coapted no sign of infection noted. ASSESSMENT AND PLAN:   Diagnoses and all orders for this visit:    Status post foot surgery        Plan: Aseptic dressing reapplied again with mild compression follow-up in 1 week at which time we will remove sutures and reevaluate the wound. May also be able to get into an athletic shoe and we will prescribe diabetic shoes for him. He will continue with all previous instructions keep the dressing clean and dry stay off the foot keep it elevated. Continue to stay off work. The patient indicates understanding of these issues and agrees to the plan.     Hong Davsi DPM

## 2023-07-13 NOTE — TELEPHONE ENCOUNTER
Disab received in forms dept. Logged for processing. Valid [place] auth received with forms signed 7/7/23.

## 2023-07-17 ENCOUNTER — OFFICE VISIT (OUTPATIENT)
Dept: PODIATRY CLINIC | Facility: CLINIC | Age: 58
End: 2023-07-17

## 2023-07-17 DIAGNOSIS — E11.42 DIABETIC PERIPHERAL NEUROPATHY (HCC): ICD-10-CM

## 2023-07-17 DIAGNOSIS — M20.42 HAMMER TOES OF BOTH FEET: ICD-10-CM

## 2023-07-17 DIAGNOSIS — Z98.890 STATUS POST FOOT SURGERY: Primary | ICD-10-CM

## 2023-07-17 DIAGNOSIS — S98.132A AMPUTATION OF TOE OF LEFT FOOT (HCC): ICD-10-CM

## 2023-07-17 DIAGNOSIS — M20.41 HAMMER TOES OF BOTH FEET: ICD-10-CM

## 2023-07-17 NOTE — PROGRESS NOTES
Serafin Lainez is a 62year old male. Patient presents with:  Post-Op: Post op: sx 6/26-  Pt denies any pain   HA1c: 7.7 on 6/23. No FBS recorded this morning        HPI:   This pleasant gentleman returns to the clinic now 3 weeks after surgery is not having any pain. At today's visit reviewed nurse's history as taken above, allergies medications and medical history as documented below. All changes duly noted  Allergies: Patient has no known allergies. Current Outpatient Medications   Medication Sig Dispense Refill    amLODIPine 10 MG Oral Tab Take 1 tablet (10 mg total) by mouth daily. 30 tablet 0    dapagliflozin (FARXIGA) 10 MG Oral Tab Take 15 mg by mouth daily. Meloxicam 15 MG Oral Tab Take 1 tablet (15 mg total) by mouth daily. valsartan 160 MG Oral Tab Take 2 tablets (320 mg total) by mouth daily. rosuvastatin 20 MG Oral Tab Take 1 tablet (20 mg total) by mouth nightly. PEG 3350-KCl-NaBcb-NaCl-NaSulf (PEG 3350/ELECTROLYTES) 240 g Oral Recon Soln Take as directed by your physician. 1 Bottle 0      Past Medical History:   Diagnosis Date    Abdominal hernia     High cholesterol     Other and unspecified hyperlipidemia     Type II or unspecified type diabetes mellitus without mention of complication, not stated as uncontrolled     Unspecified sleep apnea       Past Surgical History:   Procedure Laterality Date    HERNIA SURGERY      SHOULDER SURG PROC UNLISTED        History reviewed. No pertinent family history. Social History    Socioeconomic History      Marital status:     Tobacco Use      Smoking status: Former        Types: Cigars      Smokeless tobacco: Never    Vaping Use      Vaping Use: Never used    Substance and Sexual Activity      Alcohol use:  Yes        Alcohol/week: 2.0 standard drinks of alcohol        Types: 2 Cans of beer per week      Drug use: No          REVIEW OF SYSTEMS:   Today reviewed systens as documented below  GENERAL HEALTH: feels well otherwise  SKIN: Refer to exam below  RESPIRATORY: denies shortness of breath with exertion  CARDIOVASCULAR: denies chest pain on exertion  GI: denies abdominal pain and denies heartburn  NEURO: denies headaches    EXAM:   There were no vitals taken for this visit. GENERAL: well developed, well nourished, in no apparent distress  EXTREMITIES:   The skin incision is well-healed there is no sign of an infection present. The hyperkeratosis in the blood blister of his hallux was debrided showing good skin underneath. Sutures were removed no dehiscence no erythema no edema no sign of infection Band-Aid and antibiotic ointment applied which they will do once daily for a week  ASSESSMENT AND PLAN:   Diagnoses and all orders for this visit:    Status post foot surgery  -     DME - EXTERNAL     Diabetic peripheral neuropathy ([address]  -     DME - EXTERNAL     Amputation of toe of left foot ([address]  -     DME - EXTERNAL     Hammer toes of both feet  -     DME - EXTERNAL         Plan: Patient can resume shower but should not let his foot soaking the bottom of the shower pain for long period time and they will apply Band-Aid and antibiotic ointment and follow-up with me in 1 week for checkup he can bring his athletic shoe with him which I briefly tried on seems to be of good fit and I will see him at that time but he was again cautioned any signs of infection go to the emergency room should they occur. The patient indicates understanding of these issues and agrees to the plan.     Sarahy Souza DPM

## 2023-07-20 ENCOUNTER — TELEPHONE (OUTPATIENT)
Dept: PODIATRY CLINIC | Facility: CLINIC | Age: 58
End: 2023-07-20

## 2023-07-20 NOTE — TELEPHONE ENCOUNTER
S/w patient and informed him that he could wear [employer] shoes in the meantime. He already had a pair, he just wanted to make sure that would be okay.  He has appointment on 7/24 and will bring his [employer] shoes in to show Dr Elliot Cortez

## 2023-07-20 NOTE — TELEPHONE ENCOUNTER
The patient should go to the [employer] balance store at [address] in [facility] and be fit with appropriate shoe gear he needs a wide stability shoe with a well-padded so

## 2023-07-20 NOTE — TELEPHONE ENCOUNTER
Please advise if you have shoe recommendations in the meantime while patient waits for diabetic shoes.  Also advise if patient can go back to work before getting diabetic shoes

## 2023-07-20 NOTE — TELEPHONE ENCOUNTER
Per pt was given order for diabetic shoes, is not able to get appt until a month out, asking if he will be able to go back to work before getting shoes? Please advise thank you.

## 2023-07-24 ENCOUNTER — OFFICE VISIT (OUTPATIENT)
Dept: PODIATRY CLINIC | Facility: CLINIC | Age: 58
End: 2023-07-24

## 2023-07-24 DIAGNOSIS — Z98.890 STATUS POST FOOT SURGERY: ICD-10-CM

## 2023-07-24 DIAGNOSIS — B35.1 DERMATOPHYTOSIS OF NAIL: Primary | ICD-10-CM

## 2023-07-24 DIAGNOSIS — E11.42 DIABETIC PERIPHERAL NEUROPATHY (HCC): ICD-10-CM

## 2023-07-24 PROCEDURE — 87101 SKIN FUNGI CULTURE: CPT | Performed by: PODIATRIST

## 2023-07-24 NOTE — PROGRESS NOTES
Laurie Valdovinos is a 62year old male. Patient presents with:  Post-Op: Left 2nd toe amputation 6/26/23.   this morning-HgA1C 7.7 check on 6/23/23. No pain, numbness and tingling sensation per Patient has diabetic neuropathy        HPI:   Patient returns to clinic almost 1 month status post surgery doing well has not noticed any drainage from his second toe amputation site in the left hallux is not draining either. He is dry Band-Aid on it as instructed. At today's visit reviewed nurse's history as taken above, allergies medications and medical history as documented below. All changes duly noted  Allergies: Patient has no known allergies. Current Outpatient Medications   Medication Sig Dispense Refill    amLODIPine 10 MG Oral Tab Take 1 tablet (10 mg total) by mouth daily. 30 tablet 0    dapagliflozin (FARXIGA) 10 MG Oral Tab Take 15 mg by mouth daily. Meloxicam 15 MG Oral Tab Take 1 tablet (15 mg total) by mouth daily. valsartan 160 MG Oral Tab Take 2 tablets (320 mg total) by mouth daily. rosuvastatin 20 MG Oral Tab Take 1 tablet (20 mg total) by mouth nightly. PEG 3350-KCl-NaBcb-NaCl-NaSulf (PEG 3350/ELECTROLYTES) 240 g Oral Recon Soln Take as directed by your physician. 1 Bottle 0      Past Medical History:   Diagnosis Date    Abdominal hernia     High cholesterol     Other and unspecified hyperlipidemia     Type II or unspecified type diabetes mellitus without mention of complication, not stated as uncontrolled     Unspecified sleep apnea       Past Surgical History:   Procedure Laterality Date    HERNIA SURGERY      SHOULDER SURG PROC UNLISTED        History reviewed. No pertinent family history. Social History    Socioeconomic History      Marital status:     Tobacco Use      Smoking status: Former        Types: Cigars      Smokeless tobacco: Never    Vaping Use      Vaping Use: Never used    Substance and Sexual Activity      Alcohol use:  Yes        Alcohol/week: 2.0 standard drinks of alcohol        Types: 2 Cans of beer per week      Drug use: No          REVIEW OF SYSTEMS:   Today reviewed systens as documented below  GENERAL HEALTH: feels well otherwise  SKIN: Refer to exam below  RESPIRATORY: denies shortness of breath with exertion  CARDIOVASCULAR: denies chest pain on exertion  GI: denies abdominal pain and denies heartburn  NEURO: denies headaches    EXAM:   There were no vitals taken for this visit. GENERAL: well developed, well nourished, in no apparent distress  EXTREMITIES:   1. Integument: Skin on the patient's foot shows that the surgical site is well-healed he does have thickening of his toenails 1, 3-5 on the left and 1-5 on the right a specimen was taken the left hallux nail which is the thickest and most discolored and has evidence of subungual hemorrhage. 2. Vascular: Patient has palpable pulses dorsalis pedis posterior tibial bilateral that are symmetrical and equivocal   3. Neurologic: Patient has diminished pedal sensorium with loss of protective sensation   4. Musculoskeletal: The patient has good muscle strength has a second toe amputation on the left has a mild hallux valgus on the right. ASSESSMENT AND PLAN:   Diagnoses and all orders for this visit:    Dermatophytosis of nail  -     FUNGUS DERMATOPHYTE SKIN, HAIR, NAIL CULTURE; Future    Status post foot surgery    Diabetic peripheral neuropathy [address] [address]        Plan: Today using a nail nippers were trimmed and debrided toenails 1-5 manually and mechanically in girth and width as far down to healthy tissue as possible on both feet. This was done uneventfully there was no hemorrhage. There is mild incurvation of the medial and lateral nail borders of the hallux which using a slant back technique were removed and they would not get ingrown.    Call him with the results of the fungal nail biopsy because he was probably a good candidate for Lamisil therapy discussed that this is probably the best. The patient can return to good fitting athletic shoe should keep an eye on his feet make sure there is no opening or drainage recommended still 2 weeks off work and I will see him in 2 weeks and if everything looks good he can go back to work at [employer]. The patient indicates understanding of these issues and agrees to the plan.     Dorinda Mckay DPM

## 2023-08-07 ENCOUNTER — OFFICE VISIT (OUTPATIENT)
Dept: PODIATRY CLINIC | Facility: CLINIC | Age: 58
End: 2023-08-07

## 2023-08-07 DIAGNOSIS — E11.42 DIABETIC PERIPHERAL NEUROPATHY (HCC): ICD-10-CM

## 2023-08-07 DIAGNOSIS — S80.812A ABRASION OF LEFT LOWER EXTREMITY, INITIAL ENCOUNTER: ICD-10-CM

## 2023-08-07 DIAGNOSIS — Z98.890 STATUS POST FOOT SURGERY: Primary | ICD-10-CM

## 2023-08-07 DIAGNOSIS — S98.132A AMPUTATION OF TOE OF LEFT FOOT (HCC): ICD-10-CM

## 2023-08-07 NOTE — PROGRESS NOTES
Erica Dorsey is a 62year old male. Patient presents with: Follow - Up: Left 2nd toe amputation sx on 06/26/23- fbs 109- A1c 7.7 06/24/23- minimal numbness and tingling- patient denies pain at this time. HPI:   Returns to clinic 6 weeks status post surgery overall doing well overall doing well he recently had a sore on his leg he put a Band-Aid on it when he tore it off it created quite an abrasion is on the front of his left leg there is granular tissue present there is no sign of infection. At today's visit reviewed nurse's history as taken above, allergies medications and medical history as documented below. All changes duly noted  Allergies: Patient has no known allergies. Current Outpatient Medications   Medication Sig Dispense Refill    mupirocin 2 % External Ointment Apply to the abrasion on your leg once daily after cleansing and cover with a large nonstick Band-Aid 30 g 3    dapagliflozin (FARXIGA) 10 MG Oral Tab Take 15 mg by mouth daily. Meloxicam 15 MG Oral Tab Take 1 tablet (15 mg total) by mouth daily. valsartan 160 MG Oral Tab Take 2 tablets (320 mg total) by mouth daily. rosuvastatin 20 MG Oral Tab Take 1 tablet (20 mg total) by mouth nightly. PEG 3350-KCl-NaBcb-NaCl-NaSulf (PEG 3350/ELECTROLYTES) 240 g Oral Recon Soln Take as directed by your physician. 1 Bottle 0      Past Medical History:   Diagnosis Date    Abdominal hernia     High cholesterol     Other and unspecified hyperlipidemia     Type II or unspecified type diabetes mellitus without mention of complication, not stated as uncontrolled     Unspecified sleep apnea       Past Surgical History:   Procedure Laterality Date    HERNIA SURGERY      SHOULDER SURG PROC UNLISTED        History reviewed. No pertinent family history.    Social History    Socioeconomic History      Marital status:     Tobacco Use      Smoking status: Former        Types: Cigars      Smokeless tobacco: Never    Vaping Use Vaping Use: Never used    Substance and Sexual Activity      Alcohol use: Yes        Alcohol/week: 2.0 standard drinks of alcohol        Types: 2 Cans of beer per week      Drug use: No          REVIEW OF SYSTEMS:   Today reviewed systens as documented below  GENERAL HEALTH: feels well otherwise  SKIN: Refer to exam below  RESPIRATORY: denies shortness of breath with exertion  CARDIOVASCULAR: denies chest pain on exertion  GI: denies abdominal pain and denies heartburn  NEURO: denies headaches    EXAM:   There were no vitals taken for this visit. GENERAL: well developed, well nourished, in no apparent distress  EXTREMITIES:   1. Integument: The skin on his feet was evaluated its warm and dry. The surgical site is healed uneventfully he has hyperkeratoses at the medial plantar aspect of the hallux small blister present on the left. After trimming everything just applied mupirocin ointment and a Band-Aid to that as well as the abrasion on his anterior leg. 2. Vascular: Patient again has palpable pulses   3. Neurologic: Patient has intact sensorium   4. Musculoskeletal: Patient has good muscle strength. The second toe amputation disarticulation at the MTP joint on his left foot. ASSESSMENT AND PLAN:   Diagnoses and all orders for this visit:    Status post foot surgery    Diabetic peripheral neuropathy ([address]    Amputation of toe of left foot ([address]    Abrasion of left lower extremity, initial encounter    Other orders  -     mupirocin 2 % External Ointment; Apply to the abrasion on your leg once daily after cleansing and cover with a large nonstick Band-Aid        Plan: Trimmed down debrided all hyperkeratoses gave him instructions on proper care we will send him back to work 4 hours/day for the next 2 to 3 weeks I will see him in 2 to 3 weeks we will see how he is doing. He still has to get diabetic shoes he knows that. He was cautioned on signs of infection present to the ER should they occur.     The patient indicates understanding of these issues and agrees to the plan.     Rinku Jean Baptiste DPM

## 2023-08-17 ENCOUNTER — TELEPHONE (OUTPATIENT)
Dept: ORTHOPEDICS CLINIC | Facility: CLINIC | Age: 58
End: 2023-08-17

## 2023-08-17 DIAGNOSIS — B35.1 ONYCHOMYCOSIS: Primary | ICD-10-CM

## 2023-08-17 NOTE — TELEPHONE ENCOUNTER
----- Message from Mohan De La Torre DPM sent at 8/1/2023  9:45 AM CDT -----  Results reviewed. Please inform patient that fungal culture results are positive and we should proceed with Lamisil tablet therapy. If the patient agrees we have to do a hepatic function panel, please place that order for me with a diagnosis of onychomycosis.

## 2023-08-17 NOTE — TELEPHONE ENCOUNTER
Spoke to patient and relayed message as shown below. Patient verbalized understanding. Labs ordered.

## 2023-08-21 ENCOUNTER — OFFICE VISIT (OUTPATIENT)
Dept: PODIATRY CLINIC | Facility: CLINIC | Age: 58
End: 2023-08-21

## 2023-08-21 DIAGNOSIS — Z98.890 STATUS POST FOOT SURGERY: Primary | ICD-10-CM

## 2023-08-21 NOTE — PROGRESS NOTES
Olga Neff is a 62year old male. Patient presents with:  Post-Op: Post op Left foot. Patient denies any pain does have numbness and tingling. HPI:   Patient returns for postoperative checkup doing well he has been back at work 4 hours/day for the last couple of weeks doing well he has an appointment scheduled for his shoe gear this week sometime. At today's visit reviewed nurse's history as taken above, allergies medications and medical history as documented below. All changes duly noted  Allergies: Patient has no known allergies. Current Outpatient Medications   Medication Sig Dispense Refill    mupirocin 2 % External Ointment Apply to the abrasion on your leg once daily after cleansing and cover with a large nonstick Band-Aid 30 g 3    dapagliflozin (FARXIGA) 10 MG Oral Tab Take 15 mg by mouth daily. Meloxicam 15 MG Oral Tab Take 1 tablet (15 mg total) by mouth daily. valsartan 160 MG Oral Tab Take 2 tablets (320 mg total) by mouth daily. rosuvastatin 20 MG Oral Tab Take 1 tablet (20 mg total) by mouth nightly. PEG 3350-KCl-NaBcb-NaCl-NaSulf (PEG 3350/ELECTROLYTES) 240 g Oral Recon Soln Take as directed by your physician. 1 Bottle 0      Past Medical History:   Diagnosis Date    Abdominal hernia     High cholesterol     Other and unspecified hyperlipidemia     Type II or unspecified type diabetes mellitus without mention of complication, not stated as uncontrolled     Unspecified sleep apnea       Past Surgical History:   Procedure Laterality Date    HERNIA SURGERY      SHOULDER SURG PROC UNLISTED        History reviewed. No pertinent family history. Social History    Socioeconomic History      Marital status:     Tobacco Use      Smoking status: Former        Types: Cigars      Smokeless tobacco: Never    Vaping Use      Vaping Use: Never used    Substance and Sexual Activity      Alcohol use:  Yes        Alcohol/week: 2.0 standard drinks of alcohol        Types: 2 Cans of beer per week      Drug use: No          REVIEW OF SYSTEMS:   Today reviewed systens as documented below  GENERAL HEALTH: feels well otherwise  SKIN: Refer to exam below  RESPIRATORY: denies shortness of breath with exertion  CARDIOVASCULAR: denies chest pain on exertion  GI: denies abdominal pain and denies heartburn  NEURO: denies headaches    EXAM:   There were no vitals taken for this visit. GENERAL: well developed, well nourished, in no apparent distress  EXTREMITIES:   The skin incision of the second toe amputation site is well-healed the left hallux looks much better. No sign of infection noted. ASSESSMENT AND PLAN:   Diagnoses and all orders for this visit:    Status post foot surgery        Plan: Today the patient was given a note to return to work duties as tolerated full-time and then follow-up with us again in about 2 to 3 weeks for checkup but sooner if required. The patient indicates understanding of these issues and agrees to the plan.     Consuelo Middleton DPM

## 2023-09-12 ENCOUNTER — OFFICE VISIT (OUTPATIENT)
Dept: PODIATRY CLINIC | Facility: CLINIC | Age: 58
End: 2023-09-12

## 2023-09-12 DIAGNOSIS — M20.12 HALLUX VALGUS OF LEFT FOOT: ICD-10-CM

## 2023-09-12 DIAGNOSIS — M20.42 HAMMER TOE OF LEFT FOOT: ICD-10-CM

## 2023-09-12 DIAGNOSIS — E11.42 DIABETIC PERIPHERAL NEUROPATHY (HCC): Primary | ICD-10-CM

## 2023-09-12 DIAGNOSIS — L97.521 ULCER OF TOE OF LEFT FOOT, LIMITED TO BREAKDOWN OF SKIN (HCC): ICD-10-CM

## 2023-09-12 PROCEDURE — 99213 OFFICE O/P EST LOW 20 MIN: CPT | Performed by: PODIATRIST

## 2023-09-12 RX ORDER — AMOXICILLIN AND CLAVULANATE POTASSIUM 875; 125 MG/1; MG/1
1 TABLET, FILM COATED ORAL 2 TIMES DAILY
Qty: 30 TABLET | Refills: 0 | Status: SHIPPED | OUTPATIENT
Start: 2023-09-12

## 2023-09-26 ENCOUNTER — OFFICE VISIT (OUTPATIENT)
Dept: PODIATRY CLINIC | Facility: CLINIC | Age: 58
End: 2023-09-26

## 2023-09-26 DIAGNOSIS — M20.42 HAMMER TOE OF LEFT FOOT: ICD-10-CM

## 2023-09-26 DIAGNOSIS — E11.42 DIABETIC PERIPHERAL NEUROPATHY (HCC): Primary | ICD-10-CM

## 2023-09-26 DIAGNOSIS — L97.521 ULCER OF TOE OF LEFT FOOT, LIMITED TO BREAKDOWN OF SKIN (HCC): ICD-10-CM

## 2023-09-26 PROCEDURE — 99213 OFFICE O/P EST LOW 20 MIN: CPT | Performed by: PODIATRIST

## 2023-09-29 NOTE — PROGRESS NOTES
Nel Kothari is a 62year old male. Patient presents with:  Post-Op: 6th pov- left 2nd toe amputation- patient denies pain at this time- no numbness- no tingling. HPI:   Patient returns for checkup on his fourth toe of the left foot. Thinks it is looking little better. At today's visit reviewed nurse's history as taken above, allergies medications and medical history as documented below. All changes duly noted  Allergies: Patient has no known allergies. Current Outpatient Medications   Medication Sig Dispense Refill    amoxicillin clavulanate 875-125 MG Oral Tab Take 1 tablet by mouth 2 (two) times daily. 30 tablet 0    mupirocin 2 % External Ointment Apply to the tip of the affected toe twice daily and cover with a Band-Aid after cleansing 30 g 0    mupirocin 2 % External Ointment Apply to the abrasion on your leg once daily after cleansing and cover with a large nonstick Band-Aid 30 g 3    dapagliflozin (FARXIGA) 10 MG Oral Tab Take 15 mg by mouth daily. Meloxicam 15 MG Oral Tab Take 1 tablet (15 mg total) by mouth daily. valsartan 160 MG Oral Tab Take 2 tablets (320 mg total) by mouth daily. rosuvastatin 20 MG Oral Tab Take 1 tablet (20 mg total) by mouth nightly. PEG 3350-KCl-NaBcb-NaCl-NaSulf (PEG 3350/ELECTROLYTES) 240 g Oral Recon Soln Take as directed by your physician. 1 Bottle 0      Past Medical History:   Diagnosis Date    Abdominal hernia     High cholesterol     Other and unspecified hyperlipidemia     Type II or unspecified type diabetes mellitus without mention of complication, not stated as uncontrolled     Unspecified sleep apnea       Past Surgical History:   Procedure Laterality Date    HERNIA SURGERY      SHOULDER SURG PROC UNLISTED        History reviewed. No pertinent family history.    Social History    Socioeconomic History      Marital status:     Tobacco Use      Smoking status: Former        Types: Cigars      Smokeless tobacco: Never Vaping Use      Vaping Use: Never used    Substance and Sexual Activity      Alcohol use: Yes        Alcohol/week: 2.0 standard drinks of alcohol        Types: 2 Cans of beer per week      Drug use: No          REVIEW OF SYSTEMS:   Today reviewed systens as documented below  GENERAL HEALTH: feels well otherwise  SKIN: Refer to exam below  RESPIRATORY: denies shortness of breath with exertion  CARDIOVASCULAR: denies chest pain on exertion  GI: denies abdominal pain and denies heartburn  NEURO: denies headaches    EXAM:   There were no vitals taken for this visit. GENERAL: well developed, well nourished, in no apparent distress  EXTREMITIES:   1. Integument: The skin on the left foot is warm and dry the surgical site where the amputation was performed is well-healed no sign of infection the fourth toe is still has slight erythema but just at the tip this is significantly improved since his last visit the ulceration is superficial and there is no exposed bone. 2. Vascular: The patient has palpable pulses   3. Neurologic: The patient has diminished pedal sensorium   4. Musculoskeletal: Patient has hammertoes of the third and fourth toes of his left foot these are not fully reducible. ASSESSMENT AND PLAN:   Diagnoses and all orders for this visit:    Diabetic peripheral neuropathy ([address]    Hammer toe of left foot    Ulcer of toe of left foot, limited to breakdown of skin ([address]        Plan: Today trimmed and debrided some hyperkeratosis patient will continue with physical restrictions 1 more month note dispensed patient will follow-up again in about 2 to 3 weeks for checkup we will see how it is doing but he was cautioned again on signs of worsening infection should those occur to return to the clinic sooner or the emergency room and have me paged. The patient indicates understanding of these issues and agrees to the plan.     Beatriz Brooks DPM

## 2023-10-10 ENCOUNTER — OFFICE VISIT (OUTPATIENT)
Dept: PODIATRY CLINIC | Facility: CLINIC | Age: 58
End: 2023-10-10

## 2023-10-10 ENCOUNTER — TELEPHONE (OUTPATIENT)
Dept: PODIATRY CLINIC | Facility: CLINIC | Age: 58
End: 2023-10-10

## 2023-10-10 DIAGNOSIS — M20.42 HAMMER TOE OF LEFT FOOT: ICD-10-CM

## 2023-10-10 DIAGNOSIS — M86.9 OSTEOMYELITIS OF TOE OF LEFT FOOT (HCC): ICD-10-CM

## 2023-10-10 DIAGNOSIS — E11.42 DIABETIC PERIPHERAL NEUROPATHY (HCC): Primary | ICD-10-CM

## 2023-10-10 DIAGNOSIS — L97.521 ULCER OF TOE OF LEFT FOOT, LIMITED TO BREAKDOWN OF SKIN (HCC): ICD-10-CM

## 2023-10-10 PROCEDURE — 99213 OFFICE O/P EST LOW 20 MIN: CPT | Performed by: PODIATRIST

## 2023-10-10 NOTE — TELEPHONE ENCOUNTER
Procedure: Total amputation fourth toe left foot  CPT code: 32192  Length of Surgery: 45 minutes  Any Instruments: Mini power mini fluoroscopy  Call patient: ASAP  Anesthesia: MAC  Location: [facility]  Assistance: none  Pacemaker: No  Anticoagulants: No  Nickel Allergy: No  Latex Allergy: No  Diagnosis/ICD Code:   (E11.42) Diabetic peripheral neuropathy ([address]  (primary encounter diagnosis)  Plan:     (M20.42) Hammer toe of left foot  Plan:     (L97.521) Ulcer of toe of left foot, limited to breakdown of skin ([address]  Plan:     (M86.9) Osteomyelitis of toe of left foot ([address]  Plan:   Please try to get the patient booked at [facility] outpatient surgery center on Friday we will have to be in the afternoon because of 2 morning cases at [facility] should be okay with them I think.

## 2023-10-10 NOTE — PROGRESS NOTES
Opal Barlow is a 62year old male. Patient presents with:  Post-Op: L 2nd toe amputation sx on 6/26/2023- FBS this SJ=919- denies any pain at this time  Diabetic Ulcer: L 4th toe- has swelling and redness- no drainage        HPI:   Patient returns for checkup on the ulceration of his fourth toe. Relates no fevers or chills he feels like it is doing better. No drainage. At today's visit reviewed nurse's history as taken above, allergies medications and medical history as documented below. All changes duly noted  Allergies: Patient has no known allergies. Current Outpatient Medications   Medication Sig Dispense Refill    amoxicillin clavulanate 875-125 MG Oral Tab Take 1 tablet by mouth 2 (two) times daily. 30 tablet 0    mupirocin 2 % External Ointment Apply to the tip of the affected toe twice daily and cover with a Band-Aid after cleansing 30 g 0    mupirocin 2 % External Ointment Apply to the abrasion on your leg once daily after cleansing and cover with a large nonstick Band-Aid 30 g 3    dapagliflozin (FARXIGA) 10 MG Oral Tab Take 15 mg by mouth daily. Meloxicam 15 MG Oral Tab Take 1 tablet (15 mg total) by mouth daily. valsartan 160 MG Oral Tab Take 2 tablets (320 mg total) by mouth daily. rosuvastatin 20 MG Oral Tab Take 1 tablet (20 mg total) by mouth nightly. PEG 3350-KCl-NaBcb-NaCl-NaSulf (PEG 3350/ELECTROLYTES) 240 g Oral Recon Soln Take as directed by your physician. 1 Bottle 0      Past Medical History:   Diagnosis Date    Abdominal hernia     High cholesterol     Other and unspecified hyperlipidemia     Type II or unspecified type diabetes mellitus without mention of complication, not stated as uncontrolled     Unspecified sleep apnea       Past Surgical History:   Procedure Laterality Date    HERNIA SURGERY      SHOULDER SURG PROC UNLISTED        No family history on file.    Social History    Socioeconomic History      Marital status:     Tobacco Use Smoking status: Former        Types: Cigars      Smokeless tobacco: Never    Vaping Use      Vaping Use: Never used    Substance and Sexual Activity      Alcohol use: Yes        Alcohol/week: 2.0 standard drinks of alcohol        Types: 2 Cans of beer per week      Drug use: No          REVIEW OF SYSTEMS:   Today reviewed systens as documented below  GENERAL HEALTH: feels well otherwise  SKIN: Refer to exam below  RESPIRATORY: denies shortness of breath with exertion  CARDIOVASCULAR: denies chest pain on exertion  GI: denies abdominal pain and denies heartburn  NEURO: denies headaches    EXAM:   There were no vitals taken for this visit. GENERAL: well developed, well nourished, in no apparent distress  EXTREMITIES:   1. Integument: The skin on the left foot was evaluated its warm and dry the fourth toe has an ulceration which has probing to bone but there is no actual exposed bone. There is a granular base covering it the toe has slight swelling at the tip but it is not erythematous there is no purulence and there is no malodor. 2. Vascular: Patient has palpable pulses   3. Neurologic: Patient has intact sensorium   4. Musculoskeletal: Patient has good muscle strength he has contractures of the fourth toe consistent with a hammertoe and ulceration at the tip  X-rays were taken showing destructive changes to the distal phalanx of the fourth toe. ASSESSMENT AND PLAN:   Diagnoses and all orders for this visit:    Diabetic peripheral neuropathy ([address]    Hammer toe of left foot    Ulcer of toe of left foot, limited to breakdown of skin ([address]    Osteomyelitis of toe of left foot ([address]    Other orders  -     EEH AMB POD XR - LT FOOT 3 VIEWS(AP,LAT,OBLIQUE) WT BEARING        Plan: At today's visit both conservative and surgical management was discussed for the diagnoses listed above. After a lengthy conversation the patient opted for surgery after questions were answered.   The nature and extent of the surgery which would be, partial amputation distal tip fourth toe left foot, was explained in great detail. The pre-, julee-and postoperative management was discussed, along with changes in bathing habits as well. The necessity for restricted activity possible nonweightbearing was also reviewed. The possible complications and risks associated with the surgery including but not limited to recurrence of the deformity, nonresolution of the problem, infection as well as hospitalization and intravenous antibiotics if that occurs, the necessity for further surgery and/or hospitalization should complications occur or if an undesired result was obtained, and permanent numbness around the surgical site, guarantees or assurances were not offered. Questions were invited and answered to the best of my ability. At this time the patient wished to proceed with the surgical procedure and we will try to get that scheduled to the patient's convenience. The patient indicates understanding of these issues and agrees to the plan.     Hong Davis DPM

## 2023-10-11 DIAGNOSIS — E11.42 DIABETIC PERIPHERAL NEUROPATHY (HCC): Primary | ICD-10-CM

## 2023-10-11 DIAGNOSIS — L97.521 ULCER OF TOE OF LEFT FOOT, LIMITED TO BREAKDOWN OF SKIN (HCC): ICD-10-CM

## 2023-10-11 DIAGNOSIS — M86.9 OSTEOMYELITIS OF TOE OF LEFT FOOT (HCC): ICD-10-CM

## 2023-10-11 DIAGNOSIS — M20.42 HAMMER TOE OF LEFT FOOT: ICD-10-CM

## 2023-10-11 NOTE — TELEPHONE ENCOUNTER
S/W PATIENT AND GOT PROCEDURE SCHEDULED AT [place] DUE TO ANES. STAFFING SHORTAGE AT [facility]. PATIENT IS FULLY AWARE THAT HE WILL NEED H&P CLEARANCE FROM HIS PCP TO PROCEED WITH FRIDAY'S PROCEDURE. PRE OP FORM HAS BEEN FAXED TO DR Natalia Strickland OFFICE TO THE NUMBER IN THE PATIENT'S CHART. FAX NUMBER WAS ALSO CONFIRMED VIA PHONE WITH DR. PARTIDA'S OFFICE. PRE OP INSTRUCTIONS WERE GONE OVER WITH THE PATIENT AND ARE ALSO VIEWABLE TO THE PATIENT VIA [email or web address]. POST OP APPOINTMENTS HAVE ALSO BEEN MADE AT THIS TIME.

## 2023-10-12 ENCOUNTER — ANESTHESIA EVENT (OUTPATIENT)
Dept: SURGERY | Facility: HOSPITAL | Age: 58
End: 2023-10-12
Payer: COMMERCIAL

## 2023-10-12 ENCOUNTER — LABORATORY ENCOUNTER (OUTPATIENT)
Dept: LAB | Age: 58
End: 2023-10-12
Attending: PODIATRIST
Payer: COMMERCIAL

## 2023-10-12 ENCOUNTER — EKG ENCOUNTER (OUTPATIENT)
Dept: LAB | Age: 58
End: 2023-10-12
Attending: PODIATRIST
Payer: COMMERCIAL

## 2023-10-12 ENCOUNTER — TELEPHONE (OUTPATIENT)
Dept: PODIATRY CLINIC | Facility: CLINIC | Age: 58
End: 2023-10-12

## 2023-10-12 DIAGNOSIS — E11.42 DIABETIC PERIPHERAL NEUROPATHY (HCC): ICD-10-CM

## 2023-10-12 DIAGNOSIS — E78.5 HYPERLIPEMIA: ICD-10-CM

## 2023-10-12 DIAGNOSIS — L97.521 ULCER OF TOE OF LEFT FOOT, LIMITED TO BREAKDOWN OF SKIN (HCC): ICD-10-CM

## 2023-10-12 DIAGNOSIS — M86.9 OSTEOMYELITIS OF TOE OF LEFT FOOT (HCC): ICD-10-CM

## 2023-10-12 DIAGNOSIS — E11.42 DIABETIC PERIPHERAL NEUROPATHY (HCC): Primary | ICD-10-CM

## 2023-10-12 DIAGNOSIS — M20.42 HAMMER TOE OF LEFT FOOT: ICD-10-CM

## 2023-10-12 DIAGNOSIS — E11.21 DIABETIC GLOMERULOPATHY (HCC): ICD-10-CM

## 2023-10-12 DIAGNOSIS — Z12.5 SPECIAL SCREENING FOR MALIGNANT NEOPLASM OF PROSTATE: Primary | ICD-10-CM

## 2023-10-12 DIAGNOSIS — B35.1 ONYCHOMYCOSIS: ICD-10-CM

## 2023-10-12 LAB
ALBUMIN SERPL-MCNC: 3.4 G/DL (ref 3.4–5)
ALBUMIN/GLOB SERPL: 0.9 {RATIO} (ref 1–2)
ALP LIVER SERPL-CCNC: 75 U/L
ALT SERPL-CCNC: 29 U/L
ANION GAP SERPL CALC-SCNC: 6 MMOL/L (ref 0–18)
AST SERPL-CCNC: 11 U/L (ref 15–37)
ATRIAL RATE: 69 BPM
BASOPHILS # BLD AUTO: 0.04 X10(3) UL (ref 0–0.2)
BASOPHILS NFR BLD AUTO: 0.7 %
BILIRUB DIRECT SERPL-MCNC: <0.1 MG/DL (ref 0–0.2)
BILIRUB SERPL-MCNC: 0.5 MG/DL (ref 0.1–2)
BUN BLD-MCNC: 42 MG/DL (ref 7–18)
CALCIUM BLD-MCNC: 9 MG/DL (ref 8.5–10.1)
CHLORIDE SERPL-SCNC: 111 MMOL/L (ref 98–112)
CHOLEST SERPL-MCNC: 149 MG/DL (ref ?–200)
CO2 SERPL-SCNC: 24 MMOL/L (ref 21–32)
CREAT BLD-MCNC: 1.39 MG/DL
EGFRCR SERPLBLD CKD-EPI 2021: 59 ML/MIN/1.73M2 (ref 60–?)
EOSINOPHIL # BLD AUTO: 0.13 X10(3) UL (ref 0–0.7)
EOSINOPHIL NFR BLD AUTO: 2.2 %
ERYTHROCYTE [DISTWIDTH] IN BLOOD BY AUTOMATED COUNT: 13.3 %
FASTING PATIENT LIPID ANSWER: YES
FASTING STATUS PATIENT QL REPORTED: YES
GLOBULIN PLAS-MCNC: 4 G/DL (ref 2.8–4.4)
GLUCOSE BLD-MCNC: 130 MG/DL (ref 70–99)
HCT VFR BLD AUTO: 36.2 %
HDLC SERPL-MCNC: 41 MG/DL (ref 40–59)
HGB BLD-MCNC: 11.8 G/DL
IMM GRANULOCYTES # BLD AUTO: 0.01 X10(3) UL (ref 0–1)
IMM GRANULOCYTES NFR BLD: 0.2 %
LDLC SERPL CALC-MCNC: 80 MG/DL (ref ?–100)
LYMPHOCYTES # BLD AUTO: 1.6 X10(3) UL (ref 1–4)
LYMPHOCYTES NFR BLD AUTO: 26.7 %
MCH RBC QN AUTO: 29.2 PG (ref 26–34)
MCHC RBC AUTO-ENTMCNC: 32.6 G/DL (ref 31–37)
MCV RBC AUTO: 89.6 FL
MONOCYTES # BLD AUTO: 0.42 X10(3) UL (ref 0.1–1)
MONOCYTES NFR BLD AUTO: 7 %
NEUTROPHILS # BLD AUTO: 3.79 X10 (3) UL (ref 1.5–7.7)
NEUTROPHILS # BLD AUTO: 3.79 X10(3) UL (ref 1.5–7.7)
NEUTROPHILS NFR BLD AUTO: 63.2 %
NONHDLC SERPL-MCNC: 108 MG/DL (ref ?–130)
OSMOLALITY SERPL CALC.SUM OF ELEC: 304 MOSM/KG (ref 275–295)
P AXIS: 52 DEGREES
P-R INTERVAL: 164 MS
PLATELET # BLD AUTO: 147 10(3)UL (ref 150–450)
POTASSIUM SERPL-SCNC: 5.1 MMOL/L (ref 3.5–5.1)
PROT SERPL-MCNC: 7.4 G/DL (ref 6.4–8.2)
PSA SERPL-MCNC: 0.97 NG/ML (ref ?–4)
Q-T INTERVAL: 388 MS
QRS DURATION: 86 MS
QTC CALCULATION (BEZET): 415 MS
R AXIS: -11 DEGREES
RBC # BLD AUTO: 4.04 X10(6)UL
SODIUM SERPL-SCNC: 141 MMOL/L (ref 136–145)
T AXIS: 32 DEGREES
TRIGL SERPL-MCNC: 163 MG/DL (ref 30–149)
VENTRICULAR RATE: 69 BPM
VLDLC SERPL CALC-MCNC: 26 MG/DL (ref 0–30)
WBC # BLD AUTO: 6 X10(3) UL (ref 4–11)

## 2023-10-12 PROCEDURE — 83036 HEMOGLOBIN GLYCOSYLATED A1C: CPT

## 2023-10-12 PROCEDURE — 82248 BILIRUBIN DIRECT: CPT

## 2023-10-12 PROCEDURE — 3044F HG A1C LEVEL LT 7.0%: CPT | Performed by: STUDENT IN AN ORGANIZED HEALTH CARE EDUCATION/TRAINING PROGRAM

## 2023-10-12 PROCEDURE — 36415 COLL VENOUS BLD VENIPUNCTURE: CPT

## 2023-10-12 PROCEDURE — 80061 LIPID PANEL: CPT

## 2023-10-12 PROCEDURE — 85025 COMPLETE CBC W/AUTO DIFF WBC: CPT

## 2023-10-12 PROCEDURE — 93010 ELECTROCARDIOGRAM REPORT: CPT | Performed by: INTERNAL MEDICINE

## 2023-10-12 PROCEDURE — 93005 ELECTROCARDIOGRAM TRACING: CPT

## 2023-10-12 PROCEDURE — 80053 COMPREHEN METABOLIC PANEL: CPT

## 2023-10-12 PROCEDURE — 84153 ASSAY OF PSA TOTAL: CPT

## 2023-10-12 NOTE — TELEPHONE ENCOUNTER
Nedra العلي from [facility] calling to inform that Surgery for toe imputation will need to be reschedule because of medication instructions.   Please f/u with nurse   Expecting a call back

## 2023-10-12 NOTE — TELEPHONE ENCOUNTER
S/W patient today to touch bases on obtaining pre op clearance prior to tomorrow's surgery. Patient advised me that appt with PCP is set for today at 4pm. I have called the office and left a vm for medical staff at Dr. Rene Pagan office asking them to fax pre op clearance directly to PAT at [facility]. I left fax number [phone] in this vm.

## 2023-10-13 ENCOUNTER — ANESTHESIA (OUTPATIENT)
Dept: SURGERY | Facility: HOSPITAL | Age: 58
End: 2023-10-13
Payer: COMMERCIAL

## 2023-10-14 LAB — HGBA1C: 6.9 %

## 2023-10-16 ENCOUNTER — TELEPHONE (OUTPATIENT)
Dept: PODIATRY CLINIC | Facility: CLINIC | Age: 58
End: 2023-10-16

## 2023-10-16 ENCOUNTER — APPOINTMENT (OUTPATIENT)
Dept: GENERAL RADIOLOGY | Facility: HOSPITAL | Age: 58
End: 2023-10-16
Attending: PODIATRIST

## 2023-10-16 ENCOUNTER — HOSPITAL ENCOUNTER (OUTPATIENT)
Facility: HOSPITAL | Age: 58
Setting detail: HOSPITAL OUTPATIENT SURGERY
Discharge: HOME | End: 2023-10-16
Attending: PODIATRIST | Admitting: PODIATRIST

## 2023-10-16 VITALS
HEIGHT: 72 IN | RESPIRATION RATE: 16 BRPM | SYSTOLIC BLOOD PRESSURE: 136 MMHG | TEMPERATURE: 97 F | BODY MASS INDEX: 31.14 KG/M2 | HEART RATE: 58 BPM | DIASTOLIC BLOOD PRESSURE: 67 MMHG | OXYGEN SATURATION: 96 % | WEIGHT: 229.94 LBS

## 2023-10-16 DIAGNOSIS — L97.521 ULCER OF TOE OF LEFT FOOT, LIMITED TO BREAKDOWN OF SKIN (HCC): ICD-10-CM

## 2023-10-16 DIAGNOSIS — M20.42 HAMMER TOE OF LEFT FOOT: ICD-10-CM

## 2023-10-16 DIAGNOSIS — M86.9 OSTEOMYELITIS OF TOE OF LEFT FOOT (HCC): ICD-10-CM

## 2023-10-16 DIAGNOSIS — E11.42 DIABETIC PERIPHERAL NEUROPATHY (HCC): Primary | ICD-10-CM

## 2023-10-16 LAB
GLUCOSE BLD-MCNC: 111 MG/DL (ref 70–99)
GLUCOSE BLD-MCNC: 121 MG/DL (ref 70–99)

## 2023-10-16 PROCEDURE — 28825 PARTIAL AMPUTATION OF TOE: CPT | Performed by: PODIATRIST

## 2023-10-16 PROCEDURE — 0Y6W0Z3 DETACHMENT AT LEFT 4TH TOE, LOW, OPEN APPROACH: ICD-10-PCS | Performed by: PODIATRIST

## 2023-10-16 PROCEDURE — 76000 FLUOROSCOPY <1 HR PHYS/QHP: CPT | Performed by: PODIATRIST

## 2023-10-16 RX ORDER — PROCHLORPERAZINE EDISYLATE 5 MG/ML
5 INJECTION INTRAMUSCULAR; INTRAVENOUS EVERY 8 HOURS PRN
Status: DISCONTINUED | OUTPATIENT
Start: 2023-10-16 | End: 2023-10-16

## 2023-10-16 RX ORDER — HYDROMORPHONE HYDROCHLORIDE 1 MG/ML
0.4 INJECTION, SOLUTION INTRAMUSCULAR; INTRAVENOUS; SUBCUTANEOUS EVERY 5 MIN PRN
Status: DISCONTINUED | OUTPATIENT
Start: 2023-10-16 | End: 2023-10-16

## 2023-10-16 RX ORDER — ONDANSETRON 2 MG/ML
4 INJECTION INTRAMUSCULAR; INTRAVENOUS EVERY 6 HOURS PRN
Status: DISCONTINUED | OUTPATIENT
Start: 2023-10-16 | End: 2023-10-16

## 2023-10-16 RX ORDER — HYDROCODONE BITARTRATE AND ACETAMINOPHEN 5; 325 MG/1; MG/1
2 TABLET ORAL ONCE AS NEEDED
Status: DISCONTINUED | OUTPATIENT
Start: 2023-10-16 | End: 2023-10-16

## 2023-10-16 RX ORDER — HYDROMORPHONE HYDROCHLORIDE 1 MG/ML
0.2 INJECTION, SOLUTION INTRAMUSCULAR; INTRAVENOUS; SUBCUTANEOUS EVERY 5 MIN PRN
Status: DISCONTINUED | OUTPATIENT
Start: 2023-10-16 | End: 2023-10-16

## 2023-10-16 RX ORDER — LIDOCAINE HYDROCHLORIDE 10 MG/ML
INJECTION, SOLUTION EPIDURAL; INFILTRATION; INTRACAUDAL; PERINEURAL AS NEEDED
Status: DISCONTINUED | OUTPATIENT
Start: 2023-10-16 | End: 2023-10-16 | Stop reason: SURG

## 2023-10-16 RX ORDER — DEXTROSE MONOHYDRATE 25 G/50ML
50 INJECTION, SOLUTION INTRAVENOUS
Status: DISCONTINUED | OUTPATIENT
Start: 2023-10-16 | End: 2023-10-16 | Stop reason: HOSPADM

## 2023-10-16 RX ORDER — CEFAZOLIN SODIUM/WATER 2 G/20 ML
SYRINGE (ML) INTRAVENOUS
Status: DISCONTINUED
Start: 2023-10-16 | End: 2023-10-16

## 2023-10-16 RX ORDER — NICOTINE POLACRILEX 4 MG
15 LOZENGE BUCCAL
Status: DISCONTINUED | OUTPATIENT
Start: 2023-10-16 | End: 2023-10-16 | Stop reason: HOSPADM

## 2023-10-16 RX ORDER — SODIUM CHLORIDE, SODIUM LACTATE, POTASSIUM CHLORIDE, CALCIUM CHLORIDE 600; 310; 30; 20 MG/100ML; MG/100ML; MG/100ML; MG/100ML
INJECTION, SOLUTION INTRAVENOUS CONTINUOUS
Status: DISCONTINUED | OUTPATIENT
Start: 2023-10-16 | End: 2023-10-16

## 2023-10-16 RX ORDER — BUPIVACAINE HYDROCHLORIDE 5 MG/ML
INJECTION, SOLUTION EPIDURAL; INTRACAUDAL AS NEEDED
Status: DISCONTINUED | OUTPATIENT
Start: 2023-10-16 | End: 2023-10-16 | Stop reason: HOSPADM

## 2023-10-16 RX ORDER — NICOTINE POLACRILEX 4 MG
30 LOZENGE BUCCAL
Status: DISCONTINUED | OUTPATIENT
Start: 2023-10-16 | End: 2023-10-16 | Stop reason: HOSPADM

## 2023-10-16 RX ORDER — ACETAMINOPHEN 500 MG
1000 TABLET ORAL ONCE
Status: DISCONTINUED | OUTPATIENT
Start: 2023-10-16 | End: 2023-10-16 | Stop reason: HOSPADM

## 2023-10-16 RX ORDER — HYDROMORPHONE HYDROCHLORIDE 1 MG/ML
0.6 INJECTION, SOLUTION INTRAMUSCULAR; INTRAVENOUS; SUBCUTANEOUS EVERY 5 MIN PRN
Status: DISCONTINUED | OUTPATIENT
Start: 2023-10-16 | End: 2023-10-16

## 2023-10-16 RX ORDER — NALOXONE HYDROCHLORIDE 0.4 MG/ML
0.08 INJECTION, SOLUTION INTRAMUSCULAR; INTRAVENOUS; SUBCUTANEOUS AS NEEDED
Status: DISCONTINUED | OUTPATIENT
Start: 2023-10-16 | End: 2023-10-16

## 2023-10-16 RX ORDER — CEFAZOLIN SODIUM/WATER 2 G/20 ML
2 SYRINGE (ML) INTRAVENOUS ONCE
Status: COMPLETED | OUTPATIENT
Start: 2023-10-16 | End: 2023-10-16

## 2023-10-16 RX ORDER — SCOLOPAMINE TRANSDERMAL SYSTEM 1 MG/1
1 PATCH, EXTENDED RELEASE TRANSDERMAL ONCE
Status: DISCONTINUED | OUTPATIENT
Start: 2023-10-16 | End: 2023-10-16 | Stop reason: HOSPADM

## 2023-10-16 RX ORDER — MIDAZOLAM HYDROCHLORIDE 1 MG/ML
INJECTION INTRAMUSCULAR; INTRAVENOUS AS NEEDED
Status: DISCONTINUED | OUTPATIENT
Start: 2023-10-16 | End: 2023-10-16 | Stop reason: SURG

## 2023-10-16 RX ORDER — HYDROCODONE BITARTRATE AND ACETAMINOPHEN 5; 325 MG/1; MG/1
1 TABLET ORAL ONCE AS NEEDED
Status: DISCONTINUED | OUTPATIENT
Start: 2023-10-16 | End: 2023-10-16

## 2023-10-16 RX ORDER — ACETAMINOPHEN 500 MG
1000 TABLET ORAL ONCE AS NEEDED
Status: DISCONTINUED | OUTPATIENT
Start: 2023-10-16 | End: 2023-10-16

## 2023-10-16 RX ADMIN — SODIUM CHLORIDE, SODIUM LACTATE, POTASSIUM CHLORIDE, CALCIUM CHLORIDE: 600; 310; 30; 20 INJECTION, SOLUTION INTRAVENOUS at 13:35:00

## 2023-10-16 RX ADMIN — LIDOCAINE HYDROCHLORIDE 50 MG: 10 INJECTION, SOLUTION EPIDURAL; INFILTRATION; INTRACAUDAL; PERINEURAL at 13:35:00

## 2023-10-16 RX ADMIN — MIDAZOLAM HYDROCHLORIDE 2 MG: 1 INJECTION INTRAMUSCULAR; INTRAVENOUS at 13:32:00

## 2023-10-16 RX ADMIN — CEFAZOLIN SODIUM/WATER 2 G: 2 G/20 ML SYRINGE (ML) INTRAVENOUS at 13:37:00

## 2023-10-16 NOTE — ANESTHESIA POSTPROCEDURE EVALUATION
[address] [facility],[address] [facility] Patient Status:  Hospital Outpatient Surgery   Age/Gender 62year old male MRN [id number]   [facility] SURGERY Attending Pritesh Kaiser, [address] Day # 0 PCP Parish Kumar DO       Anesthesia Post-op Note    Partial amputation fourth toe left foot    Procedure Summary       Date: 10/16/23 Room / Location: [facility] MAIN OR 15 / [address] [facility] MAIN OR    Anesthesia Start: 1330 Anesthesia Stop: 6434    Procedure: Partial amputation fourth toe left foot (Left) Diagnosis:       Diabetic peripheral neuropathy ([address]      Hammer toe of left foot      Ulcer of toe of left foot, limited to breakdown of skin ([address]      Osteomyelitis of toe of left foot ([address]      (Diabetic peripheral neuropathy ([address] [E11.42]Hammer toe of left foot [M20.42]Ulcer of toe of left foot, limited to breakdown of skin (HCC) [L97.521]Osteomyelitis of toe of left foot ([address] [M86.9])    Surgeons: Pritesh Kaiser DPM Anesthesiologist: Kelsey Peralta MD    Anesthesia Type: MAC ASA Status: 3            Anesthesia Type: MAC    Vitals Value Taken Time   BP 98/65 10/16/23 1422   Temp 97 10/16/23 1422   Pulse 71 10/16/23 1422   Resp 18 10/16/23 1422   SpO2 95 10/16/23 1422       Patient Location: Same Day Surgery    Anesthesia Type: MAC    Airway Patency: patent    Postop Pain Control: adequate    Mental Status: preanesthetic baseline    Nausea/Vomiting: none    Cardiopulmonary/Hydration status: stable euvolemic    Complications: no apparent anesthesia related complications    Postop vital signs: stable    Dental Exam: Unchanged from Preop    Patient to be discharged home when criteria met.

## 2023-10-16 NOTE — OPERATIVE REPORT
[facility]     OPERATIVE REPORT    Andria Long    CSN [id number] MRN [id number]    1965 Age 62year old   Admission Date 10/16/2023 Operation Date 10/16/2023   Attending Physician Veronica Kang DPM Operating Physician Rinku Jean Baptiste DPM   PCP Mervat Segovia DO             PREOPERATIVE DIAGNOSIS:    Diabetic peripheral neuropathy  Hammertoe of the fourth digit left foot  Diabetic neuropathic ulcer with secondary osteomyelitis of the distal phalanx fourth toe, left foot secondary to above. POSTOPERATIVE DIAGNOSIS: Same    PROCEDURE: Partial amputation with resection of the distal phalanx fourth toe left foot     ANESTHESIA:  Local with light sedation, utilizing 0.5% Marcaine plain. 6 cc total via digital block to the fourth toe left foot     HEMOSTASIS: Pneumatic ankle tourniquet inflated 250 mmHg following exsanguination with Jamie's bandage left foot     ESTIMATED BLOOD LOSS: 1 cc     INDICATIONS:  This 62year old male presented with this gentleman had developed an ulceration on the fourth toe of his left foot. Started with hyperkeratosis was trimmed debrided eventually developed an ulceration which did not heal and eventually at his last visit he was showing changes in the bone consistent with osteomyelitis of the distal phalanx on the fourth toe of his left foot. FINDINGS: There is an ulceration to the distal tip of the fourth toe left foot with bone directly palpable underneath erythema and edema noted to the tip of the toe. SPECIMENS: Tip of the fourth toe to pathology for osteomyelitis of the distal phalanx left foot     COMPLICATIONS:  None. DRAINS:       OPERATIVE TECHNIQUE:      The patient was brought into the operating with vital signs stable and placed in a supine position on the operating table with all personnel present all equipment was in the room and functional timeout was taken there were no additions deletions or concerns reported.   The patient was placed under intravenous sedation the left foot was prepped and draped you using the usual aseptic technique and the fourth toe was anesthetized as above. Hemostasis was achieved as above. 2 converging incisions in a fishmouth style pattern were made over the distal inner phalangeal joint of the fourth toe with the apices located proximal medial and proximal lateral.  Incision was deepened using both sharp and blunt technique superficial veins were retracted. Were cauterized. The tendon of extensor digitorum longus was incised transversely across the head of the middle phalanx the toe was plantarflexed the collateral ligaments were severed. Plantar dissection was carried out releasing the long flexor tendon as far proximal as possible. The toe was then dissected free and passed to the field for pathology specimen. There is then flushed with copious amounts of saline and attention was directed towards closure the skin flaps were reapproximated easily utilizing 3-0 nylon sutures totaling 5 sutures in a simple interrupted pattern. A sterile postoperative dressing was then applied after the pneumatic ankle tourniquet was released and the digits all returned to their uniform pink color and were warm to the touch dressing consisted of Xeroform sterile gauze 4 inch Kailee followed by an Ace wrap. The patient tolerated the above anesthesia procedure well left the operating with vital signs stable and the vascular status of his left foot intact to recovery room via cart.     Jocelin Dwyer DPM

## 2023-10-16 NOTE — DISCHARGE INSTRUCTIONS
[address] Operative Site(s)   Keep operative area completely dry. Place and ice bag over the surgical area 10 minutes out of every half hour (for the first 24 hours  after the surgery. DO NOT USE HOT WATER BAGS OR ELECTIC HEATING PADS ON YOUR FOOT. Place a pillow under your calf so that the surgical foot is elevated. Throbbing discomfort can be  helped by keeping the foot elevated. Remain quiet and off your feet for the first 72 hours. Limit walking to you tolerance, unless  instructed otherwise by your physician. Stay off your feet as much as possible. Your bandages may become somewhat bloody. Should this occur, do not become alarmed. Do  not remove bandages. Bend knee and rotate foot and ankle at least 5 minutes during each hour after surgery for 24-36  hours while awake. Wear your surgical shoe/cast brace when walking, if a surgical shoe/cast brace was dispensed to  you. DO NOT TAKE ANY STEPS WITHOUT THE SHOE/CAST BRACE. On your return home today, sit sideways in the back seat of the car with the surgical foot  Elevated. Local Anesthesia - Resume diet at tolerated. General Anesthesia or Local with Sedation -   Stays in your body about 24 hours. You may have: headache, soreness and aching, nausea and vomiting, dizziness, tiredness. Sore throat and  hoarseness may be present after general anesthesia only. Drink liquids and eat light foods. Remain on liquids Only if nausea and vomiting occur. Advance to your  regular diet as tolerated. No ALCOHOLIC BEVERAGES FOR 24 HOURS. For the next 24 hours: rest, move cautiously, do not drive or operate equipment, and do not make any  personal or legal decisions. If prescription pain medication is ordered, fill it immediately -   Take as directed. Do not take on an empty stomach. Do not drive or operate equipment.    Do not drink alcohol    Call your doctor for -   Active, persistent bleeding (call at once), swelling redness at incision site. Severe pain not controlled by pain medication   Bumping or otherwise injuring your foot or surgical site in ay way (call at once). Temperature over 101 degrees Fahrenheit. Persistent nausea and vomiting   Skin rash and general body itchiness. Persistent throbbing not relieved by elevation and medication. Nausea and lightheadedness due to medication. Accidentally getting bandage wet. Dry immediately with absorbent towel, then call the office. Any other problem not discussed in these instructions. IN CASE OF EMERGENCY, CALL 911 OR GO TO THE NEAREST EMERGENCY ROOM.     Post -op Appointment -  You will need to make an appointment to see the physician in his office in 1 week    If you have any questions, please call [phone]

## 2023-10-16 NOTE — BRIEF OP NOTE
Pre-Operative Diagnosis: Diabetic peripheral neuropathy (HCC) [E11.42]  Hammer toe of left foot [M20.42]  Ulcer of toe of left foot, limited to breakdown of skin ([address] [L97.521]  Osteomyelitis of toe of left foot ([address] [M86.9]     Post-Operative Diagnosis: Diabetic peripheral neuropathy ([address] [E11.42]Hammer toe of left foot [M20.42]Ulcer of toe of left foot, limited to breakdown of skin (HCC) [L97.521]Osteomyelitis of toe of left foot ([address] [M86.9]      Procedure Performed:   Partial amputation fourth toe left foot    Surgeon(s) and Role:     * Paige Felix DPM - Primary    Assistant(s):        Surgical Findings: Ulcer tip of fourth toe left with exposed bone     Specimen: tip of fourth to left to path for osteimyelitis     Estimated Blood Loss: Blood Output: 1 mL (10/16/2023  2:02 PM)      Dictation Number:  in 270 Donna Iyer DPM  10/16/2023  2:15 PM

## 2023-10-19 ENCOUNTER — TELEPHONE (OUTPATIENT)
Dept: PODIATRY CLINIC | Facility: CLINIC | Age: 58
End: 2023-10-19

## 2023-10-19 NOTE — TELEPHONE ENCOUNTER
Procedure date:10/16/2026         Procedure Laterality Anesthesia   Partial amputation fourth toe left foot          How are you feeling? / Doing good  Any bleeding?/ noticed small amount of bleeding since he left the hospital- dried now    Is the dressing dry & intact? / yes  Level of pain?/ 0   Character of pain: / has neuropathy but some tingling  Onset of pain:/worse at night   Aggravating factors:not moving ,sitting or at night    Duration of pain:/ not very long  Alleviating factors:/ movement. Not sure if it is his neuropathy  or the surgery  Are you taking the prescribed medication? / yes     Medication Quantity Refills Start End   amoxicillin clavulanate 875-125 MG Oral Tab         Are you following all of the PO instructions? / appetite good    Other Comments:none stated    Follow-up appt  date: 10/23/2023    Pt was advised if they have any concerns after hours to call our office and they would be directed to on call physician.   DONE

## 2023-10-19 NOTE — TELEPHONE ENCOUNTER
Called patient and left a message. Will try to contact him later. Office phone number given.   /  post op nurse

## 2023-10-23 ENCOUNTER — OFFICE VISIT (OUTPATIENT)
Dept: PODIATRY CLINIC | Facility: CLINIC | Age: 58
End: 2023-10-23
Payer: COMMERCIAL

## 2023-10-23 DIAGNOSIS — E11.42 DIABETIC PERIPHERAL NEUROPATHY (HCC): Primary | ICD-10-CM

## 2023-10-23 DIAGNOSIS — Z98.890 STATUS POST FOOT SURGERY: ICD-10-CM

## 2023-10-23 DIAGNOSIS — M86.9 OSTEOMYELITIS OF TOE OF LEFT FOOT (HCC): ICD-10-CM

## 2023-10-23 PROCEDURE — 99024 POSTOP FOLLOW-UP VISIT: CPT | Performed by: PODIATRIST

## 2023-10-23 NOTE — PROGRESS NOTES
Yovana Logan is a 62year old male. No chief complaint on file. HPI:   Patient returns to clinic now 1 week status post surgery partial amputation distal tip fourth toe left foot. No complaints of fevers or chills. At today's visit reviewed nurse's history as taken above, allergies medications and medical history as documented below. All changes duly noted  Allergies: Patient has no known allergies. Current Outpatient Medications   Medication Sig Dispense Refill    amoxicillin clavulanate 875-125 MG Oral Tab Take 1 tablet by mouth 2 (two) times daily. 30 tablet 0    mupirocin 2 % External Ointment Apply to the tip of the affected toe twice daily and cover with a Band-Aid after cleansing 30 g 0    dapagliflozin (FARXIGA) 10 MG Oral Tab Take 15 mg by mouth daily. Meloxicam 15 MG Oral Tab Take 1 tablet (15 mg total) by mouth daily. valsartan 160 MG Oral Tab Take 2 tablets (320 mg total) by mouth daily. rosuvastatin 20 MG Oral Tab Take 1 tablet (20 mg total) by mouth nightly. Past Medical History:   Diagnosis Date    Abdominal hernia     High blood pressure     High cholesterol     Neuropathy     feet    Other and unspecified hyperlipidemia     Type II or unspecified type diabetes mellitus without mention of complication, not stated as uncontrolled     Unspecified sleep apnea     Visual impairment       Past Surgical History:   Procedure Laterality Date    HERNIA SURGERY      OTHER SURGICAL HISTORY  06/2023    2nd toe of left foot amputated    SHOULDER SURG PROC UNLISTED        No family history on file. Social History    Socioeconomic History      Marital status:     Tobacco Use      Smoking status: Former        Types: Cigars      Smokeless tobacco: Never    Vaping Use      Vaping Use: Never used    Substance and Sexual Activity      Alcohol use:  Yes        Alcohol/week: 2.0 standard drinks of alcohol        Types: 2 Cans of beer per week        Comment: social Drug use: No          REVIEW OF SYSTEMS:   Today reviewed systens as documented below  GENERAL HEALTH: feels well otherwise  SKIN: Refer to exam below  RESPIRATORY: denies shortness of breath with exertion  CARDIOVASCULAR: denies chest pain on exertion  GI: denies abdominal pain and denies heartburn  NEURO: denies headaches    EXAM:   There were no vitals taken for this visit. GENERAL: well developed, well nourished, in no apparent distress  EXTREMITIES:   The incision line is healing uneventfully no sign of infection noted  ASSESSMENT AND PLAN:   Diagnoses and all orders for this visit:    Diabetic peripheral neuropathy ([address]    Osteomyelitis of toe of left foot ([address]    Status post foot surgery        Plan: At today's visit reapplied an aseptic dressing to the fourth toe incision on his left foot patient will continue with rest and elevation he can get around a little bit more on his foot to take care of himself rather than that should stay off it and keep it elevated he will present to the emergency room if he has any questions of infection have us paged and call the office. We will see him in follow-up in 1 week    The patient indicates understanding of these issues and agrees to the plan.     Thi Alamo DPM

## 2023-10-24 RX ORDER — FLASH GLUCOSE SENSOR
KIT MISCELLANEOUS
COMMUNITY
Start: 2023-09-29

## 2023-10-30 ENCOUNTER — OFFICE VISIT (OUTPATIENT)
Dept: PODIATRY CLINIC | Facility: CLINIC | Age: 58
End: 2023-10-30

## 2023-10-30 ENCOUNTER — OFFICE VISIT (OUTPATIENT)
Facility: CLINIC | Age: 58
End: 2023-10-30
Payer: COMMERCIAL

## 2023-10-30 VITALS
OXYGEN SATURATION: 98 % | WEIGHT: 232 LBS | HEART RATE: 74 BPM | HEIGHT: 72 IN | BODY MASS INDEX: 31.42 KG/M2 | DIASTOLIC BLOOD PRESSURE: 82 MMHG | SYSTOLIC BLOOD PRESSURE: 142 MMHG

## 2023-10-30 DIAGNOSIS — E11.69 TYPE 2 DIABETES MELLITUS WITH OTHER SPECIFIED COMPLICATION, WITHOUT LONG-TERM CURRENT USE OF INSULIN (HCC): Primary | ICD-10-CM

## 2023-10-30 DIAGNOSIS — M20.41 HAMMER TOES OF BOTH FEET: ICD-10-CM

## 2023-10-30 DIAGNOSIS — Z98.890 STATUS POST FOOT SURGERY: Primary | ICD-10-CM

## 2023-10-30 DIAGNOSIS — M20.42 HAMMER TOES OF BOTH FEET: ICD-10-CM

## 2023-10-30 DIAGNOSIS — M20.11 HALLUX VALGUS OF RIGHT FOOT: ICD-10-CM

## 2023-10-30 DIAGNOSIS — L84 HELOMA MOLLE: ICD-10-CM

## 2023-10-30 DIAGNOSIS — E11.42 DIABETIC PERIPHERAL NEUROPATHY (HCC): ICD-10-CM

## 2023-10-30 PROCEDURE — 3008F BODY MASS INDEX DOCD: CPT | Performed by: STUDENT IN AN ORGANIZED HEALTH CARE EDUCATION/TRAINING PROGRAM

## 2023-10-30 PROCEDURE — 3079F DIAST BP 80-89 MM HG: CPT | Performed by: STUDENT IN AN ORGANIZED HEALTH CARE EDUCATION/TRAINING PROGRAM

## 2023-10-30 PROCEDURE — 3077F SYST BP >= 140 MM HG: CPT | Performed by: STUDENT IN AN ORGANIZED HEALTH CARE EDUCATION/TRAINING PROGRAM

## 2023-10-30 PROCEDURE — 99205 OFFICE O/P NEW HI 60 MIN: CPT | Performed by: STUDENT IN AN ORGANIZED HEALTH CARE EDUCATION/TRAINING PROGRAM

## 2023-10-30 NOTE — PROGRESS NOTES
Kalyan Montoya is a 62year old male. Patient presents with:  Post-Op:  Post op right foot. Patient denies any pain in office. FBS. A1C 6.9 on 10/12. HPI:   Returns to clinic he is now 2 weeks status post surgery doing well. At today's visit reviewed nurse's history as taken above, allergies medications and medical history as documented below. All changes duly noted  Allergies: Patient has no known allergies. Current Outpatient Medications   Medication Sig Dispense Refill    Continuous Blood Gluc Sensor ([employer] NIKHIL 14 DAY SENSOR) Does not apply Misc USE TO CHECK FASTING BLOOD SUGAR IN THE MORNING AND 2 HOURS AFTER EACH MEAL DAILY AS DIRECTED      mupirocin 2 % External Ointment Apply to the tip of the affected toe twice daily and cover with a Band-Aid after cleansing 30 g 0    dapagliflozin (FARXIGA) 10 MG Oral Tab Take 15 mg by mouth daily. Meloxicam 15 MG Oral Tab Take 1 tablet (15 mg total) by mouth daily. valsartan 160 MG Oral Tab Take 2 tablets (320 mg total) by mouth daily. rosuvastatin 20 MG Oral Tab Take 1 tablet (20 mg total) by mouth nightly. amoxicillin clavulanate 875-125 MG Oral Tab Take 1 tablet by mouth 2 (two) times daily. 30 tablet 0      Past Medical History:   Diagnosis Date    Abdominal hernia     High blood pressure     High cholesterol     Neuropathy     feet    Other and unspecified hyperlipidemia     Type II or unspecified type diabetes mellitus without mention of complication, not stated as uncontrolled     Unspecified sleep apnea     Visual impairment       Past Surgical History:   Procedure Laterality Date    HERNIA SURGERY      OTHER SURGICAL HISTORY  06/2023    2nd toe of left foot amputated    SHOULDER SURG PROC UNLISTED        History reviewed. No pertinent family history.    Social History    Socioeconomic History      Marital status:     Tobacco Use      Smoking status: Former        Types: Cigars      Smokeless tobacco: Never Vaping Use      Vaping Use: Never used    Substance and Sexual Activity      Alcohol use: Yes        Alcohol/week: 2.0 standard drinks of alcohol        Types: 2 Cans of beer per week        Comment: social      Drug use: No          REVIEW OF SYSTEMS:   Today reviewed systens as documented below  GENERAL HEALTH: feels well otherwise  SKIN: Refer to exam below  RESPIRATORY: denies shortness of breath with exertion  CARDIOVASCULAR: denies chest pain on exertion  GI: denies abdominal pain and denies heartburn  NEURO: denies headaches    EXAM:   There were no vitals taken for this visit. GENERAL: well developed, well nourished, in no apparent distress  EXTREMITIES:   1. Integument: The skin incision where the fourth toe was partially amputated he is well-healed sutures removed no dehiscence the next week the Band-Aid will be applied from bottom to top and removed the same way and was applied at today's visit. Patient was also complaining of a corn at the medial DIP joint of the second toe right foot. This is from an adjacent hallux valgus deformity. 2. Vascular: Patient does have palpable pulses   3. Neurologic: Patient has intact sensorium   4. Musculoskeletal: Patient has good muscle strength is a hallux valgus deformity of the right foot with an adjacent hammertoe and a soft corn at the DIP joint of the second toe. ASSESSMENT AND PLAN:   Diagnoses and all orders for this visit:    Status post foot surgery    Diabetic peripheral neuropathy [address] [address]    Hallux valgus of right foot    Heloma molle    Hammer toes of both feet        Plan: Today gave the patient care instructions for the amputation site I also dispensed a crest pad for him to wear putting it on the fourth toe to elevate the adjacent third toe so that would not get a corn and become infected. I examined his insoles they seem to be of good fit and finish.   I trimmed down and debrided the hyperkeratosis at the medial second toe gave him a silicone separator to wear and I will see him in follow-up in about 2 to 3 weeks for checkup but sooner if required. The patient indicates understanding of these issues and agrees to the plan.     Diane Rodriguez DPM

## 2023-10-30 NOTE — PATIENT INSTRUCTIONS
Return Visit   [  ] Physician in 3 months   [  ] In person or video visit  [  ] In person only    [ x ] After visit summary   [ x ] Placed labs/imaging. Can you also give the fax number for our clinic? [  ] Central scheduling # for ultrasound/nuclear med/CT/MRI/DXA  [  ] Directions to 1st floor lab  [  ] Med rep info for:  [  ] Dental clearance form  [  ] Will need authorization for outside records  [  ] Give blood sugar log  [  ] Other: It was great meeting you today! Today we discussed your diabetes:  -Please get your urine microalbumin test prior to our follow up (if you have done this in the last year please let me know)  -we also discussed meeting with Tim Hull. I will alert her that you are interested in diabetic education   -Please see the eye doctor at your earliest convenience.  You can send me the results on my chart or fax it directly to our clinic  -Continue with the Armani Vasquez and Drury Simmonds  -If you are having high (>250) or low (<60) just check with a fingerstick to confirm they are real     Take care!  -Dr. Kuhn Clos

## 2023-11-02 ENCOUNTER — TELEPHONE (OUTPATIENT)
Facility: CLINIC | Age: 58
End: 2023-11-02

## 2023-11-02 NOTE — TELEPHONE ENCOUNTER
Placed call to patient to schedule carb aware teaching with DM Educator.   Scheduled for 11/8 at 3:30 pm    [facility]

## 2023-11-06 ENCOUNTER — TELEPHONE (OUTPATIENT)
Facility: CLINIC | Age: 58
End: 2023-11-06

## 2023-11-06 ENCOUNTER — PATIENT MESSAGE (OUTPATIENT)
Facility: CLINIC | Age: 58
End: 2023-11-06

## 2023-11-06 NOTE — TELEPHONE ENCOUNTER
From: Joel Walters  To: Remigio James  Sent: 11/6/2023 2:10 PM CST  Subject: Medical records    Good afternoon! I hope you are doing well. Thank you for sending me your records. I was able to review your cholesterol levels and your urine microalbumin testing. I would recommend repeating your microalbumin prior to our follow up appointment just so we can follow your urine protein level.      Thank you!  -Dr. Lenore Madsen

## 2023-11-06 NOTE — TELEPHONE ENCOUNTER
The microalbumin to creatinine ratio is elevated. This was done earlier this year and his diabetes has improved since. He is on farxiga which can help protect his kidneys and improvement in his DM will also help with decreasing the urine protein. A repeat level prior to our follow up will be helpful to see if the numbers have improved.

## 2023-11-07 ENCOUNTER — MED REC SCAN ONLY (OUTPATIENT)
Facility: CLINIC | Age: 58
End: 2023-11-07

## 2023-11-07 NOTE — PROGRESS NOTES
T2DM Nutrition and Lifestyle Counseling    Start Time: 3:19 pm  End Time:4:30 pm    Indication:  Patient seen by Dr. Pietro Voss for T2DM, referred to Educator for nutrition and lifestyle counseling    Patient Concerns:  - Staying on top of things   - Getting A1C down to 6%  - Knowing what foods to eat when    Recent A1C:  Last A1c value was 6.9% done 10/12/2023. Current Diabetes Medications:  Farxiga 10 mg daily    Blood Sugar Intake  Continuous Glucose Monitoring  Trends: NIKHIL            Nutrition Intake:    TYPICAL Food Notes   Breakfast  Omelette, turkey sausage, english muffin with PB&J Occassionally skips   Lunch Leftovers from dinner    [employer]. [employer], [employer] and greenbeans, salad, baked chicken, broccoli and Mac n cheese    Snacks Chips and salsa After work    [employer], coffee throughout the day with sugar free creamer      Sleep Patterns:  Regular, 6-7 hours    Stressors:  - Recent amputation, worried that it will happen again  - White coat anxiety  - Not working right now, anxious to get back     Physical Activity   Not currently working out due to amputation recovery but would like to get back into it safely, and plans to reach out to daughter-in-law who who a Physical Therapist    Nutrition Topics Discussed   - Discussed basic meal planning guidelines for diabetes regular mealtime, limited concentrated sweets.  Worked on establishing eating pattern/timing of meals and snacks    - Discussed in depth meal planning using the healthy eating with diabetes plate method with focus on balanced macronutrient consumption, including identifying foods that are carbohydrates, lean protein, non-starchy vegetables, and heart healthy fats   - Stressed importance of carbohydrate consistency in each meal   - Recommended carbohydrate targets of 45-60 grams at meals and 15-30 grams at snacks   - Educated on label reading   - Educated on portion control; including use of measuring cups, spoons, or food scale   - Provided suggestions for lower carb, healthy snacks   - Discussed how to handle special occasions, dining out, and eating on the go   - Discussed menu planning, healthy food shopping, cooking tips, and need to pre prepare foods    - Educated on emotional eating and being mindful at meals   - Emphasized the need for small, sustainable changes and working on [employer] goals to encourage sustainable behaviors    - Instructions on how to use helpful websites or nutrition/tracking apps reviewed    - If applicable, taught to use carbohydrate to insulin ratio and insulin sensitivity factor    - Discussed barriers and overcoming barriers to best achieve meal planning goals      Recommendations:  - Find ways to manage growing stress  - Add protein to every meal   - Celebrate how well you're already doing!!    Follow Up:   With Chloe Licona in mid December     Hillary RECINOS  Diabetes Educator

## 2023-11-08 ENCOUNTER — NURSE ONLY (OUTPATIENT)
Facility: CLINIC | Age: 58
End: 2023-11-08
Payer: COMMERCIAL

## 2023-11-08 DIAGNOSIS — E11.69 TYPE 2 DIABETES MELLITUS WITH OTHER SPECIFIED COMPLICATION, WITHOUT LONG-TERM CURRENT USE OF INSULIN (HCC): Primary | ICD-10-CM

## 2023-11-08 PROCEDURE — 99211 OFF/OP EST MAY X REQ PHY/QHP: CPT | Performed by: STUDENT IN AN ORGANIZED HEALTH CARE EDUCATION/TRAINING PROGRAM

## 2023-11-13 ENCOUNTER — OFFICE VISIT (OUTPATIENT)
Dept: PODIATRY CLINIC | Facility: CLINIC | Age: 58
End: 2023-11-13
Payer: COMMERCIAL

## 2023-11-13 DIAGNOSIS — Z98.890 STATUS POST FOOT SURGERY: Primary | ICD-10-CM

## 2023-11-13 PROCEDURE — 99212 OFFICE O/P EST SF 10 MIN: CPT | Performed by: PODIATRIST

## 2023-11-15 NOTE — PROGRESS NOTES
Naz Lau is a 62year old male. Chief Complaint   Patient presents with    Geovanni Quick op right foot. Patient denies any pain. FBS not taken today. HPI:   This patient returns to clinic for checkup on his fourth toe amputation site checkup on his third toe. At today's visit reviewed nurse's history as taken above, allergies medications and medical history as documented below. All changes duly noted  Allergies: Patient has no known allergies. Current Outpatient Medications   Medication Sig Dispense Refill    Continuous Blood Gluc Sensor ([employer]YLE NIKHIL 14 DAY SENSOR) Does not apply Misc USE TO CHECK FASTING BLOOD SUGAR IN THE MORNING AND 2 HOURS AFTER EACH MEAL DAILY AS DIRECTED      dapagliflozin (FARXIGA) 10 MG Oral Tab Take 15 mg by mouth daily. Meloxicam 15 MG Oral Tab Take 1 tablet (15 mg total) by mouth daily. valsartan 160 MG Oral Tab Take 2 tablets (320 mg total) by mouth daily. rosuvastatin 20 MG Oral Tab Take 1 tablet (20 mg total) by mouth nightly. mupirocin 2 % External Ointment Apply to the tip of the affected toe twice daily and cover with a Band-Aid after cleansing 30 g 0      Past Medical History:   Diagnosis Date    Abdominal hernia     High blood pressure     High cholesterol     Neuropathy     feet    Other and unspecified hyperlipidemia     Type II or unspecified type diabetes mellitus without mention of complication, not stated as uncontrolled     Unspecified sleep apnea     Visual impairment       Past Surgical History:   Procedure Laterality Date    HERNIA SURGERY      OTHER SURGICAL HISTORY  06/2023    2nd toe of left foot amputated    SHOULDER SURG PROC UNLISTED        History reviewed. No pertinent family history.    Social History     Socioeconomic History    Marital status:    Tobacco Use    Smoking status: Former     Types: Cigars    Smokeless tobacco: Never   Vaping Use    Vaping Use: Never used   Substance and Sexual Activity    Alcohol use: Yes     Alcohol/week: 2.0 standard drinks of alcohol     Types: 2 Cans of beer per week     Comment: social    Drug use: No           REVIEW OF SYSTEMS:   Today reviewed systens as documented below  GENERAL HEALTH: feels well otherwise  SKIN: Refer to exam below  RESPIRATORY: denies shortness of breath with exertion  CARDIOVASCULAR: denies chest pain on exertion  GI: denies abdominal pain and denies heartburn  NEURO: denies headaches    EXAM:   There were no vitals taken for this visit. GENERAL: well developed, well nourished, in no apparent distress  EXTREMITIES:   Skin incisions on his amputation sites second toe left and partial amputation fourth toe left are well-healed there is no sign of an infection. There was mild hyperkeratosis at the distal tip of third toe but that is now resolved. ASSESSMENT AND PLAN:   Diagnoses and all orders for this visit:    Status post foot surgery        Plan: Dispensed a pad for him to wear on his fourth toe amputation site to elevate the third toe he can return to his work duties he can walk a little bit more I will see him again in follow-up in about 4 to 6 weeks to make sure everything is okay he is aware of the complications and risks of developing new sores will present to the clinic or emergency room immediately should he notice them. The patient indicates understanding of these issues and agrees to the plan.     Mickey Vanegas DPM

## 2023-11-21 ENCOUNTER — OFFICE VISIT (OUTPATIENT)
Dept: PODIATRY CLINIC | Facility: CLINIC | Age: 58
End: 2023-11-21

## 2023-11-21 DIAGNOSIS — L97.521 ULCER OF TOE OF LEFT FOOT, LIMITED TO BREAKDOWN OF SKIN (HCC): ICD-10-CM

## 2023-11-21 DIAGNOSIS — E11.42 DIABETIC PERIPHERAL NEUROPATHY (HCC): Primary | ICD-10-CM

## 2023-11-21 DIAGNOSIS — M20.11 HALLUX VALGUS OF RIGHT FOOT: ICD-10-CM

## 2023-11-21 DIAGNOSIS — M20.41 HAMMER TOES OF BOTH FEET: ICD-10-CM

## 2023-11-21 DIAGNOSIS — M20.42 HAMMER TOES OF BOTH FEET: ICD-10-CM

## 2023-11-21 PROCEDURE — 99213 OFFICE O/P EST LOW 20 MIN: CPT | Performed by: PODIATRIST

## 2023-11-21 RX ORDER — AMOXICILLIN AND CLAVULANATE POTASSIUM 875; 125 MG/1; MG/1
1 TABLET, FILM COATED ORAL 2 TIMES DAILY
Qty: 20 TABLET | Refills: 0 | Status: SHIPPED | OUTPATIENT
Start: 2023-11-21

## 2023-11-26 NOTE — PROGRESS NOTES
Odin Clancy is a 62year old male. Chief Complaint   Patient presents with    Scott Hoit op right foot.  this am - no complaints    Diabetic Ulcer     New sore on 3rd toe right foot - denies pain          HPI:   Patient returns to the clinic today now developed an ulcer on third toe left foot some discomfort in between the first and second toes on the right. At today's visit reviewed nurse's history as taken above, allergies medications and medical history as documented below. All changes duly noted  Allergies: Patient has no known allergies. Current Outpatient Medications   Medication Sig Dispense Refill    amoxicillin clavulanate 875-125 MG Oral Tab Take 1 tablet by mouth 2 (two) times daily. 20 tablet 0    Continuous Blood Gluc Sensor ([employer] NIKHIL 14 DAY SENSOR) Does not apply Misc USE TO CHECK FASTING BLOOD SUGAR IN THE MORNING AND 2 HOURS AFTER EACH MEAL DAILY AS DIRECTED      mupirocin 2 % External Ointment Apply to the tip of the affected toe twice daily and cover with a Band-Aid after cleansing (Patient taking differently: 1 Application daily. Apply to the tip of the affected toe twice daily and cover with a Band-Aid after cleansing) 30 g 0    dapagliflozin (FARXIGA) 10 MG Oral Tab Take 15 mg by mouth daily. Meloxicam 15 MG Oral Tab Take 1 tablet (15 mg total) by mouth daily. valsartan 160 MG Oral Tab Take 2 tablets (320 mg total) by mouth daily. rosuvastatin 20 MG Oral Tab Take 1 tablet (20 mg total) by mouth nightly.         Past Medical History:   Diagnosis Date    Abdominal hernia     High blood pressure     High cholesterol     Neuropathy     feet    Other and unspecified hyperlipidemia     Type II or unspecified type diabetes mellitus without mention of complication, not stated as uncontrolled     Unspecified sleep apnea     Visual impairment       Past Surgical History:   Procedure Laterality Date    HERNIA SURGERY      OTHER SURGICAL HISTORY  06/2023 2nd toe of left foot amputated    SHOULDER SURG PROC UNLISTED        History reviewed. No pertinent family history. Social History     Socioeconomic History    Marital status:    Tobacco Use    Smoking status: Former     Types: Cigars    Smokeless tobacco: Never   Vaping Use    Vaping Use: Never used   Substance and Sexual Activity    Alcohol use: Yes     Alcohol/week: 2.0 standard drinks of alcohol     Types: 2 Cans of beer per week     Comment: social    Drug use: No           REVIEW OF SYSTEMS:   Today reviewed systens as documented below  GENERAL HEALTH: feels well otherwise  SKIN: Refer to exam below  RESPIRATORY: denies shortness of breath with exertion  CARDIOVASCULAR: denies chest pain on exertion  GI: denies abdominal pain and denies heartburn  NEURO: denies headaches    EXAM:   There were no vitals taken for this visit. GENERAL: well developed, well nourished, in no apparent distress  EXTREMITIES:   1. Integument: The skin was evaluated on both feet has hyperkeratosis at the medial aspect of the second toe secondary to adjacent hallux valgus deformity on the right foot is painful but there is no open draining ulcer sore noted. The distal tip of his third toe where he has had a second toe amputation on the left he does have a small ulceration developing with localized erythema edema but no purulence and no sign of infection. 2. Vascular: Patient has palpable pulses dorsalis pedis posterior tibial bilateral   3. Neurologic: Patient does have diabetic peripheral neuropathy with loss of pain and protective sensation bilateral feet   4. Musculoskeletal: Patient has hammertoe of the third digit left foot as well as the second toe right foot. These do not reduce on forefoot loading.     ASSESSMENT AND PLAN:   Diagnoses and all orders for this visit:    Diabetic peripheral neuropathy ([address]    Hallux valgus of right foot    Hammer toes of both feet    Ulcer of toe of left foot, limited to breakdown of skin ([address]    Other orders  -     amoxicillin clavulanate 875-125 MG Oral Tab; Take 1 tablet by mouth 2 (two) times daily. Plan: Today recommended we start him on amoxicillin for about 10 days and I will see him in 7 to 10 days. He will continue with his work restrictions using a cart note was dispensed he will continue with local wound care and I will see him in follow-up at that time however he was cautioned on signs of worsening infection to call should they occur. The patient indicates understanding of these issues and agrees to the plan.     Alexis Diana DPM

## 2023-11-28 ENCOUNTER — OFFICE VISIT (OUTPATIENT)
Dept: PODIATRY CLINIC | Facility: CLINIC | Age: 58
End: 2023-11-28
Payer: COMMERCIAL

## 2023-11-28 DIAGNOSIS — M20.42 HAMMER TOES OF BOTH FEET: ICD-10-CM

## 2023-11-28 DIAGNOSIS — E11.42 DIABETIC PERIPHERAL NEUROPATHY (HCC): Primary | ICD-10-CM

## 2023-11-28 DIAGNOSIS — M20.41 HAMMER TOES OF BOTH FEET: ICD-10-CM

## 2023-11-28 DIAGNOSIS — L97.521 ULCER OF TOE OF LEFT FOOT, LIMITED TO BREAKDOWN OF SKIN (HCC): ICD-10-CM

## 2023-11-28 PROCEDURE — 99213 OFFICE O/P EST LOW 20 MIN: CPT | Performed by: PODIATRIST

## 2023-11-28 NOTE — PROGRESS NOTES
Naz Lau is a 62year old male. Chief Complaint   Patient presents with    Follow - Up     Follow up left foot. Patient denies any pain. Patient has neuropathy and has numbness and tingling. HPI:   Patient returns the clinic for follow-up on his third toe of the left foot. Thinks it is looking better. He is not wearing a pad because it makes him a little uncomfortable. At today's visit reviewed nurse's history as taken above, allergies medications and medical history as documented below. All changes duly noted  Allergies: Patient has no known allergies. Current Outpatient Medications   Medication Sig Dispense Refill    amoxicillin clavulanate 875-125 MG Oral Tab Take 1 tablet by mouth 2 (two) times daily. 20 tablet 0    Continuous Blood Gluc Sensor (FREESTYLE NIKHIL 14 DAY SENSOR) Does not apply Misc USE TO CHECK FASTING BLOOD SUGAR IN THE MORNING AND 2 HOURS AFTER EACH MEAL DAILY AS DIRECTED      mupirocin 2 % External Ointment Apply to the tip of the affected toe twice daily and cover with a Band-Aid after cleansing (Patient taking differently: 1 Application daily. Apply to the tip of the affected toe twice daily and cover with a Band-Aid after cleansing) 30 g 0    dapagliflozin (FARXIGA) 10 MG Oral Tab Take 15 mg by mouth daily. Meloxicam 15 MG Oral Tab Take 1 tablet (15 mg total) by mouth daily. valsartan 160 MG Oral Tab Take 2 tablets (320 mg total) by mouth daily. rosuvastatin 20 MG Oral Tab Take 1 tablet (20 mg total) by mouth nightly.         Past Medical History:   Diagnosis Date    Abdominal hernia     High blood pressure     High cholesterol     Neuropathy     feet    Other and unspecified hyperlipidemia     Type II or unspecified type diabetes mellitus without mention of complication, not stated as uncontrolled     Unspecified sleep apnea     Visual impairment       Past Surgical History:   Procedure Laterality Date    HERNIA SURGERY      OTHER SURGICAL HISTORY  06/2023    2nd toe of left foot amputated    SHOULDER SURG PROC UNLISTED        History reviewed. No pertinent family history. Social History     Socioeconomic History    Marital status:    Tobacco Use    Smoking status: Former     Types: Cigars    Smokeless tobacco: Never   Vaping Use    Vaping Use: Never used   Substance and Sexual Activity    Alcohol use: Yes     Alcohol/week: 2.0 standard drinks of alcohol     Types: 2 Cans of beer per week     Comment: social    Drug use: No           REVIEW OF SYSTEMS:   Today reviewed systens as documented below  GENERAL HEALTH: feels well otherwise  SKIN: Refer to exam below  RESPIRATORY: denies shortness of breath with exertion  CARDIOVASCULAR: denies chest pain on exertion  GI: denies abdominal pain and denies heartburn  NEURO: denies headaches    EXAM:   There were no vitals taken for this visit. GENERAL: well developed, well nourished, in no apparent distress  EXTREMITIES:   1. Integument: The skin on his left foot was evaluated there is no erythema no edema the redness and swelling at the tip of the third toe is improved the ulceration is still present mild macerated hyperkeratotic tissue was removed and the ulceration is very superficial does not probe deeply there is no fat layer exposed. 2. Vascular: Patient has palpable pulses on the left   3. Neurologic: Patient has intact sensorium no deficits are noted   4. Musculoskeletal: Patient has with increasing hallux valgus deformity. Second toe amputation the third toe still is in a plantarflexed position does not reduce on forefoot loading is a contributing factor to the ulceration at the distal tip of the third toe.     ASSESSMENT AND PLAN:   Diagnoses and all orders for this visit:    Diabetic peripheral neuropathy ([address]    Hammer toes of both feet    Ulcer of toe of left foot, limited to breakdown of skin ([address]        Plan: Patient to wear the pad trimmed and debrided hyperkeratotic rim he will wear the pad continuing his athletic shoes continue using the cart at work with physical restrictions and follow-up with us again in 2 weeks no sign of infection at this visit. The patient indicates understanding of these issues and agrees to the plan.     Hong Davis DPM

## 2023-12-12 ENCOUNTER — OFFICE VISIT (OUTPATIENT)
Dept: PODIATRY CLINIC | Facility: CLINIC | Age: 58
End: 2023-12-12
Payer: COMMERCIAL

## 2023-12-12 DIAGNOSIS — M20.41 HAMMER TOES OF BOTH FEET: ICD-10-CM

## 2023-12-12 DIAGNOSIS — L97.521 ULCER OF TOE OF LEFT FOOT, LIMITED TO BREAKDOWN OF SKIN (HCC): ICD-10-CM

## 2023-12-12 DIAGNOSIS — M20.42 HAMMER TOES OF BOTH FEET: ICD-10-CM

## 2023-12-12 DIAGNOSIS — E11.42 DIABETIC PERIPHERAL NEUROPATHY (HCC): Primary | ICD-10-CM

## 2023-12-12 DIAGNOSIS — M20.11 HALLUX VALGUS OF RIGHT FOOT: ICD-10-CM

## 2023-12-12 PROCEDURE — 99213 OFFICE O/P EST LOW 20 MIN: CPT | Performed by: PODIATRIST

## 2023-12-12 NOTE — PROGRESS NOTES
Albaro Pichardo is a 62year old male. Chief Complaint   Patient presents with    Post-Op     Left foot- rates pain 4/10         HPI:   Patient returns to the clinic for follow-up on his second toe ulcer. He thinks it is looking better. Still has a little bit of pain maybe about 4 out of 10. At today's visit reviewed nurse's history as taken above, allergies medications and medical history as documented below. All changes duly noted  Allergies: Patient has no known allergies. Current Outpatient Medications   Medication Sig Dispense Refill    amoxicillin clavulanate 875-125 MG Oral Tab Take 1 tablet by mouth 2 (two) times daily. 20 tablet 0    Continuous Blood Gluc Sensor ([employer]YLE NIKHIL 14 DAY SENSOR) Does not apply Misc USE TO CHECK FASTING BLOOD SUGAR IN THE MORNING AND 2 HOURS AFTER EACH MEAL DAILY AS DIRECTED      mupirocin 2 % External Ointment Apply to the tip of the affected toe twice daily and cover with a Band-Aid after cleansing (Patient taking differently: 1 Application daily. Apply to the tip of the affected toe twice daily and cover with a Band-Aid after cleansing) 30 g 0    dapagliflozin (FARXIGA) 10 MG Oral Tab Take 15 mg by mouth daily. Meloxicam 15 MG Oral Tab Take 1 tablet (15 mg total) by mouth daily. valsartan 160 MG Oral Tab Take 2 tablets (320 mg total) by mouth daily. rosuvastatin 20 MG Oral Tab Take 1 tablet (20 mg total) by mouth nightly.         Past Medical History:   Diagnosis Date    Abdominal hernia     High blood pressure     High cholesterol     Neuropathy     feet    Other and unspecified hyperlipidemia     Type II or unspecified type diabetes mellitus without mention of complication, not stated as uncontrolled     Unspecified sleep apnea     Visual impairment       Past Surgical History:   Procedure Laterality Date    HERNIA SURGERY      OTHER SURGICAL HISTORY  06/2023    2nd toe of left foot amputated    SHOULDER SURG PROC UNLISTED        History reviewed. No pertinent family history. Social History     Socioeconomic History    Marital status:    Tobacco Use    Smoking status: Former     Types: Cigars    Smokeless tobacco: Never   Vaping Use    Vaping Use: Never used   Substance and Sexual Activity    Alcohol use: Yes     Alcohol/week: 2.0 standard drinks of alcohol     Types: 2 Cans of beer per week     Comment: social    Drug use: No           REVIEW OF SYSTEMS:   Today reviewed systens as documented below  GENERAL HEALTH: feels well otherwise  SKIN: Refer to exam below  RESPIRATORY: denies shortness of breath with exertion  CARDIOVASCULAR: denies chest pain on exertion  GI: denies abdominal pain and denies heartburn  NEURO: denies headaches    EXAM:   There were no vitals taken for this visit. GENERAL: well developed, well nourished, in no apparent distress  EXTREMITIES:   1. Integument: The skin on the tip of his third toe shows that it is almost completely healed at this time is superficial discovered by dry scabbing. 2. Vascular: Has palpable pulses on the left   3. Neurologic: Patient has diminished pedal sensorium no deficits are noted   4. Musculoskeletal: Patient has good muscle strength has hammertoe deformities of 3 4 and 5 on the left foot previous second toe amputation. ASSESSMENT AND PLAN:   Diagnoses and all orders for this visit:    Diabetic peripheral neuropathy ([address]    Hammer toes of both feet    Ulcer of toe of left foot, limited to breakdown of skin (HCC)    Hallux valgus of right foot        Plan: Today trimmed down debrided hyperkeratosis gave him an accommodative pad now just for the second toe we will see how that works he can increase his walking a little bit and I will see him in follow-up in about 3 weeks. The patient indicates understanding of these issues and agrees to the plan.     Luis Cronin DPM

## 2023-12-29 ENCOUNTER — LAB ENCOUNTER (OUTPATIENT)
Dept: LAB | Age: 58
End: 2023-12-29
Attending: STUDENT IN AN ORGANIZED HEALTH CARE EDUCATION/TRAINING PROGRAM
Payer: COMMERCIAL

## 2023-12-29 DIAGNOSIS — E11.21 DIABETIC GLOMERULOPATHY (HCC): Primary | ICD-10-CM

## 2023-12-29 DIAGNOSIS — E78.5 HYPERLIPEMIA: ICD-10-CM

## 2023-12-29 DIAGNOSIS — I10 ESSENTIAL HYPERTENSION, MALIGNANT: ICD-10-CM

## 2023-12-29 LAB
ALBUMIN SERPL-MCNC: 3.4 G/DL (ref 3.4–5)
ALBUMIN/GLOB SERPL: 0.9 {RATIO} (ref 1–2)
ALP LIVER SERPL-CCNC: 80 U/L
ALT SERPL-CCNC: 30 U/L
ANION GAP SERPL CALC-SCNC: 2 MMOL/L (ref 0–18)
AST SERPL-CCNC: 19 U/L (ref 15–37)
BILIRUB SERPL-MCNC: 0.4 MG/DL (ref 0.1–2)
BUN BLD-MCNC: 37 MG/DL (ref 9–23)
CALCIUM BLD-MCNC: 9.2 MG/DL (ref 8.5–10.1)
CHLORIDE SERPL-SCNC: 113 MMOL/L (ref 98–112)
CHOLEST SERPL-MCNC: 174 MG/DL (ref ?–200)
CO2 SERPL-SCNC: 26 MMOL/L (ref 21–32)
CREAT BLD-MCNC: 1.32 MG/DL
CREAT UR-SCNC: 65.8 MG/DL
EGFRCR SERPLBLD CKD-EPI 2021: 63 ML/MIN/1.73M2 (ref 60–?)
ERYTHROCYTE [DISTWIDTH] IN BLOOD BY AUTOMATED COUNT: 13.1 %
EST. AVERAGE GLUCOSE BLD GHB EST-MCNC: 154 MG/DL (ref 68–126)
FASTING PATIENT LIPID ANSWER: YES
FASTING STATUS PATIENT QL REPORTED: YES
GLOBULIN PLAS-MCNC: 3.8 G/DL (ref 2.8–4.4)
GLUCOSE BLD-MCNC: 162 MG/DL (ref 70–99)
HBA1C MFR BLD: 7 % (ref ?–5.7)
HCT VFR BLD AUTO: 36.9 %
HDLC SERPL-MCNC: 45 MG/DL (ref 40–59)
HGB BLD-MCNC: 11.7 G/DL
LDLC SERPL CALC-MCNC: 93 MG/DL (ref ?–100)
MCH RBC QN AUTO: 29.3 PG (ref 26–34)
MCHC RBC AUTO-ENTMCNC: 31.7 G/DL (ref 31–37)
MCV RBC AUTO: 92.3 FL
MICROALBUMIN UR-MCNC: 186 MG/DL
MICROALBUMIN/CREAT 24H UR-RTO: 2826.7 UG/MG (ref ?–30)
NONHDLC SERPL-MCNC: 129 MG/DL (ref ?–130)
OSMOLALITY SERPL CALC.SUM OF ELEC: 304 MOSM/KG (ref 275–295)
PLATELET # BLD AUTO: 173 10(3)UL (ref 150–450)
POTASSIUM SERPL-SCNC: 4.9 MMOL/L (ref 3.5–5.1)
PROT SERPL-MCNC: 7.2 G/DL (ref 6.4–8.2)
RBC # BLD AUTO: 4 X10(6)UL
SODIUM SERPL-SCNC: 141 MMOL/L (ref 136–145)
TRIGL SERPL-MCNC: 210 MG/DL (ref 30–149)
TSI SER-ACNC: 3.4 MIU/ML (ref 0.36–3.74)
VLDLC SERPL CALC-MCNC: 34 MG/DL (ref 0–30)
WBC # BLD AUTO: 6.9 X10(3) UL (ref 4–11)

## 2023-12-29 PROCEDURE — 80053 COMPREHEN METABOLIC PANEL: CPT

## 2023-12-29 PROCEDURE — 82043 UR ALBUMIN QUANTITATIVE: CPT

## 2023-12-29 PROCEDURE — 82570 ASSAY OF URINE CREATININE: CPT

## 2023-12-29 PROCEDURE — 36415 COLL VENOUS BLD VENIPUNCTURE: CPT

## 2023-12-29 PROCEDURE — 85027 COMPLETE CBC AUTOMATED: CPT

## 2023-12-29 PROCEDURE — 80061 LIPID PANEL: CPT

## 2023-12-29 PROCEDURE — 83036 HEMOGLOBIN GLYCOSYLATED A1C: CPT

## 2023-12-29 PROCEDURE — 84443 ASSAY THYROID STIM HORMONE: CPT

## 2024-01-02 ENCOUNTER — OFFICE VISIT (OUTPATIENT)
Dept: PODIATRY CLINIC | Facility: CLINIC | Age: 59
End: 2024-01-02
Payer: COMMERCIAL

## 2024-01-02 DIAGNOSIS — E11.42 DIABETIC PERIPHERAL NEUROPATHY (HCC): Primary | ICD-10-CM

## 2024-01-02 DIAGNOSIS — M20.12 HALLUX VALGUS OF LEFT FOOT: ICD-10-CM

## 2024-01-02 DIAGNOSIS — M20.41 HAMMER TOES OF BOTH FEET: ICD-10-CM

## 2024-01-02 DIAGNOSIS — M20.42 HAMMER TOES OF BOTH FEET: ICD-10-CM

## 2024-01-02 PROCEDURE — 99213 OFFICE O/P EST LOW 20 MIN: CPT | Performed by: PODIATRIST

## 2024-01-04 ENCOUNTER — TELEPHONE (OUTPATIENT)
Dept: PODIATRY CLINIC | Facility: CLINIC | Age: 59
End: 2024-01-04

## 2024-01-04 NOTE — TELEPHONE ENCOUNTER
Please advise if we could add on sooner to discuss surgery. Patient was seen on 1/2/24, but note not in quite yet

## 2024-01-04 NOTE — TELEPHONE ENCOUNTER
S/w patient and offered 1/9/24 appointment at 4pm for formal surgical consultation. He accepted.    No other questions at this time

## 2024-01-04 NOTE — TELEPHONE ENCOUNTER
Per pt asking if he can be seen sooner than 1/15, states Dr. Felix had mentioned surgery and would like to go forward with surgery, would like sooner appt to discuss this. Please advise thank you.

## 2024-01-07 NOTE — PROGRESS NOTES
Juan James is a 58 year old male.   Chief Complaint   Patient presents with    Post-Op     L foot - pt states ussal pain  - States bruise on bunion by great toe - FBS-  140 -  A1C- 7.9 ON 12/09         HPI:   Returns to the clinic for follow-up on his second toe but now he is recently noticed that there was an irritation on the bunion of the left foot.  This is gotten worse since his second toe was removed as there is nothing to keep it in place.  At today's visit reviewed nurse's history as taken above, allergies medications and medical history as documented below.  All changes duly noted  Allergies: Patient has no known allergies.   Current Outpatient Medications   Medication Sig Dispense Refill    Continuous Blood Gluc Sensor (FREESTYLE NIKHIL 14 DAY SENSOR) Does not apply Misc USE TO CHECK FASTING BLOOD SUGAR IN THE MORNING AND 2 HOURS AFTER EACH MEAL DAILY AS DIRECTED      mupirocin 2 % External Ointment Apply to the tip of the affected toe twice daily and cover with a Band-Aid after cleansing (Patient taking differently: 1 Application daily. Apply to the tip of the affected toe twice daily and cover with a Band-Aid after cleansing) 30 g 0    dapagliflozin (FARXIGA) 10 MG Oral Tab Take 15 mg by mouth daily.      Meloxicam 15 MG Oral Tab Take 1 tablet (15 mg total) by mouth daily.      valsartan 160 MG Oral Tab Take 2 tablets (320 mg total) by mouth daily.      rosuvastatin 20 MG Oral Tab Take 1 tablet (20 mg total) by mouth nightly.      amoxicillin clavulanate 875-125 MG Oral Tab Take 1 tablet by mouth 2 (two) times daily. 20 tablet 0      Past Medical History:   Diagnosis Date    Abdominal hernia     High blood pressure     High cholesterol     Neuropathy     feet    Other and unspecified hyperlipidemia     Type II or unspecified type diabetes mellitus without mention of complication, not stated as uncontrolled     Unspecified sleep apnea     Visual impairment       Past Surgical History:    Procedure Laterality Date    HERNIA SURGERY      OTHER SURGICAL HISTORY  06/2023    2nd toe of left foot amputated    SHOULDER SURG PROC UNLISTED        History reviewed. No pertinent family history.   Social History     Socioeconomic History    Marital status:    Tobacco Use    Smoking status: Former     Types: Cigars    Smokeless tobacco: Never   Vaping Use    Vaping Use: Never used   Substance and Sexual Activity    Alcohol use: Yes     Alcohol/week: 2.0 standard drinks of alcohol     Types: 2 Cans of beer per week     Comment: social    Drug use: No           REVIEW OF SYSTEMS:   Today reviewed systens as documented below  GENERAL HEALTH: feels well otherwise  SKIN: Refer to exam below  RESPIRATORY: denies shortness of breath with exertion  CARDIOVASCULAR: denies chest pain on exertion  GI: denies abdominal pain and denies heartburn  NEURO: denies headaches    EXAM:   There were no vitals taken for this visit.  GENERAL: well developed, well nourished, in no apparent distress  EXTREMITIES:   1. Integument: The skin on his left foot was evaluated the ulceration is now just a dry corner scab on the tip of the second toe is well-healed however he now has an irritation on the medial eminence of his bunion deformity which has gotten worse.  There is a small scab at the medial eminence but it is not ulcerated.   2. Vascular: Patient has palpable pulses on the left brisk capillary turn to the pedal digits   3. Neurologic: Patient has intact sensorium   4. Musculoskeletal: Patient has a track bound range of motion of the first MTP joint is crepitus free on the left foot and he has a hammertoe that is got a varus declination to it.    ASSESSMENT AND PLAN:   Diagnoses and all orders for this visit:    Diabetic peripheral neuropathy (HCC)    Hammer toes of both feet    Hallux valgus of left foot        Plan: Recommended the patient come back in another week we will see if it is calm down he can put a Band-Aid over  it with antibiotic ointment on the medial eminence he is not need to care for the second toe.  At that time we will caden-ray and see what it looks like but I think a medial first MTP joint fusion on the left as well as a hammertoe correction.    The patient indicates understanding of these issues and agrees to the plan.    Porfirio Felix DPM

## 2024-01-09 ENCOUNTER — OFFICE VISIT (OUTPATIENT)
Dept: PODIATRY CLINIC | Facility: CLINIC | Age: 59
End: 2024-01-09
Payer: COMMERCIAL

## 2024-01-09 DIAGNOSIS — M20.12 HALLUX VALGUS OF LEFT FOOT: ICD-10-CM

## 2024-01-09 DIAGNOSIS — M20.41 HAMMER TOES OF BOTH FEET: Primary | ICD-10-CM

## 2024-01-09 DIAGNOSIS — M20.42 HAMMER TOES OF BOTH FEET: Primary | ICD-10-CM

## 2024-01-09 PROCEDURE — 99213 OFFICE O/P EST LOW 20 MIN: CPT | Performed by: PODIATRIST

## 2024-01-14 ENCOUNTER — TELEPHONE (OUTPATIENT)
Dept: PODIATRY CLINIC | Facility: CLINIC | Age: 59
End: 2024-01-14

## 2024-01-14 DIAGNOSIS — M20.41 HAMMER TOES OF BOTH FEET: ICD-10-CM

## 2024-01-14 DIAGNOSIS — M20.12 HALLUX VALGUS OF LEFT FOOT: Primary | ICD-10-CM

## 2024-01-14 DIAGNOSIS — M20.42 HAMMER TOES OF BOTH FEET: ICD-10-CM

## 2024-01-14 NOTE — TELEPHONE ENCOUNTER
Procedure: Metatarsal phalangeal joint fusion left foot with plate and screw fixation, fusion of the second toe with pin fixation left foot  CPT code: 91843, 24791  Length of Surgery: 2.5 hours  Any Instruments: Mini power, mini fluoroscopy K wires, [place] first MTP fusion system  Call patient: within 24 hours  Anesthesia: MAC  [facility]  Assistance: none  Pacemaker: No  Anticoagulants: No  Nickel Allergy: No  Latex Allergy: No  Diagnosis/ICD Code:     ICD-10-CM    1. Hallux valgus of left foot  M20.12       2. Hammer toes of both feet  M20.41     M20.42

## 2024-01-14 NOTE — PROGRESS NOTES
Juan James is a 58 year old male.   Chief Complaint   Patient presents with    Consult     Surgical consult for left foot. Patient denies any pain in office.         HPI:   The clinic for surgical consultation at this time.  His wife accompanies him.  At today's visit reviewed nurse's history as taken above, allergies medications and medical history as documented below.  All changes duly noted  Allergies: Patient has no known allergies.   Current Outpatient Medications   Medication Sig Dispense Refill    Continuous Blood Gluc Sensor ([employer] NIKHIL 14 DAY SENSOR) Does not apply Misc USE TO CHECK FASTING BLOOD SUGAR IN THE MORNING AND 2 HOURS AFTER EACH MEAL DAILY AS DIRECTED      mupirocin 2 % External Ointment Apply to the tip of the affected toe twice daily and cover with a Band-Aid after cleansing (Patient taking differently: 1 Application daily. Apply to the tip of the affected toe twice daily and cover with a Band-Aid after cleansing) 30 g 0    dapagliflozin (FARXIGA) 10 MG Oral Tab Take 15 mg by mouth daily.      Meloxicam 15 MG Oral Tab Take 1 tablet (15 mg total) by mouth daily.      valsartan 160 MG Oral Tab Take 2 tablets (320 mg total) by mouth daily.      rosuvastatin 20 MG Oral Tab Take 1 tablet (20 mg total) by mouth nightly.      amoxicillin clavulanate 875-125 MG Oral Tab Take 1 tablet by mouth 2 (two) times daily. 20 tablet 0      Past Medical History:   Diagnosis Date    Abdominal hernia     High blood pressure     High cholesterol     Neuropathy     feet    Other and unspecified hyperlipidemia     Type II or unspecified type diabetes mellitus without mention of complication, not stated as uncontrolled     Unspecified sleep apnea     Visual impairment       Past Surgical History:   Procedure Laterality Date    HERNIA SURGERY      OTHER SURGICAL HISTORY  06/2023    2nd toe of left foot amputated    SHOULDER SURG PROC UNLISTED        History reviewed. No pertinent family history.    Social History     Socioeconomic History    Marital status:    Tobacco Use    Smoking status: Former     Types: Cigars    Smokeless tobacco: Never   Vaping Use    Vaping Use: Never used   Substance and Sexual Activity    Alcohol use: Yes     Alcohol/week: 2.0 standard drinks of alcohol     Types: 2 Cans of beer per week     Comment: social    Drug use: No           REVIEW OF SYSTEMS:   Today reviewed systens as documented below  GENERAL HEALTH: feels well otherwise  SKIN: Refer to exam below  RESPIRATORY: denies shortness of breath with exertion  CARDIOVASCULAR: denies chest pain on exertion  GI: denies abdominal pain and denies heartburn  NEURO: denies headaches    EXAM:   There were no vitals taken for this visit.  GENERAL: well developed, well nourished, in no apparent distress  EXTREMITIES:   1. Integument: The skin on his left foot was evaluated.  The ulceration to the tip of the third toe is completely resolved at this time he has an enlarged medial eminence of the first metatarsal head on the left foot.  There is still some erythema edema over the area.   2. Vascular: Patient has palpable dorsalis pedis and posterior tibial pulses on the left with brisk capillary turn to the pedal digits   3. Neurologic: Has diminished pedal sensorium diabetic polyneuropathy.   4. Musculoskeletal: Has a track bound severe hallux valgus deformity.  X-rays were taken confirming the above examination.    ASSESSMENT AND PLAN:   Diagnoses and all orders for this visit:    Hammer toes of both feet  -     [facility] AMB POD XR - LT FOOT 2 VIEWS(AP, LATERAL)WT [facility]AR[facility]    Hallux valgus of left foot  -     [facility] AMB POD XR - LT FOOT 2 VIEWS(AP, LATERAL)WT [place]ARING        Plan: At today's visit both conservative and surgical management was discussed for the diagnoses listed above.  After a lengthy conversation the patient opted for surgery after questions were answered.  The nature and extent of the surgery which would be, first  metatarsal phalangeal joint fusion with plate and screws, left foot and PIP joint fusion of the third toe with pin fixation left foot, was explained in great detail.  The pre-, julee-and postoperative management was discussed, along with changes in bathing habits as well.  The necessity for restricted activity possible nonweightbearing was also reviewed.  The possible complications and risks associated with the surgery including but not limited to recurrence of the deformity, nonresolution of the problem, infection as well as hospitalization and intravenous antibiotics if that occurs, the necessity for further surgery and/or hospitalization should complications occur or if an undesired result was obtained, and permanent numbness around the surgical site, guarantees or assurances were not offered.  Questions were invited and answered to the best of my ability.  At this time the patient wished to proceed with the surgical procedure and we will try to get that scheduled to the patient's convenience.     The patient indicates understanding of these issues and agrees to the plan.    Porfirio Felix DPM

## 2024-01-17 NOTE — TELEPHONE ENCOUNTER
S/W patient and patient has advised that his procedure needed to be scheduled asap per his last office visit. I suggested next Friday the 26th at [facility] to the patient and patient advised that he prefers to have sx at [place] because he has had prior sx there.     Spoke with surgery scheduling at [place] and they do not have availability on the 24 th until 11 am. I declined that slot as it would interfere with clinic time.     Dr. Felix please advise on other dates we can look at. Would February 2nd work?

## 2024-01-22 ENCOUNTER — TELEPHONE (OUTPATIENT)
Dept: PODIATRY CLINIC | Facility: CLINIC | Age: 59
End: 2024-01-22

## 2024-01-22 DIAGNOSIS — M20.42 HAMMER TOES OF BOTH FEET: ICD-10-CM

## 2024-01-22 DIAGNOSIS — M20.12 HALLUX VALGUS OF LEFT FOOT: Primary | ICD-10-CM

## 2024-01-22 DIAGNOSIS — M20.41 HAMMER TOES OF BOTH FEET: ICD-10-CM

## 2024-01-22 NOTE — TELEPHONE ENCOUNTER
S/w patient and scheduled sx for 2/14 at [facility] per patient's request. At this time, patient and I have gone over pre op instructions as well as booked both POV. Patient is aware that all information gone over via phone is available on [employer]. Patient expressed verbal understanding of all information given at this time. Patient is fully aware of clearance needed from pcp prior to sx date. Managed care orders have been placed at this time and sx is added to Dr Felix's calendar. TE send to patient's pcp at this time. Patient was advised to reach me directly at [phone] should there be any questions or concerns in regards to the scheduled sx.

## 2024-01-22 NOTE — TELEPHONE ENCOUNTER
Dr. Kenny,     The patient is scheduled for outpatient foot surgery with Dr. Felix on 2/14 for the following procedure(s): metatarsal phalangeal joint fusion left foot with plate and screw fixation, fusion of the second tow with pin fixation left foot.      The patient has been advised to contact your office for pre-operative clearance.  The patient needs the following test/exams    _X___ History and Physical (H&P) may be no older that THIRTY (30) days prior to surgery    __X__ EKG for patients that are age 50 or older, have hypertension, diabetes or a history of cardiac disease or are obese.  This should be no older than 6 months prior to surgery.    ____ Complete Metabolic Panel (CMP) for patients that are age 65 and older, have hypertension, diabetes or heart problems, are taking any heart medication or are obese.  This should be no older that 3 months prior to surgery.    Please include any other test you deem necessary for medical clearance.    Thank you,   Laurita Loyd   Surgical Scheduler   [phone]

## 2024-01-25 RX ORDER — AMLODIPINE BESYLATE 10 MG/1
10 TABLET ORAL DAILY
COMMUNITY

## 2024-01-29 ENCOUNTER — OFFICE VISIT (OUTPATIENT)
Facility: CLINIC | Age: 59
End: 2024-01-29
Payer: COMMERCIAL

## 2024-01-29 VITALS — DIASTOLIC BLOOD PRESSURE: 84 MMHG | HEART RATE: 67 BPM | SYSTOLIC BLOOD PRESSURE: 144 MMHG | OXYGEN SATURATION: 99 %

## 2024-01-29 DIAGNOSIS — E11.69 TYPE 2 DIABETES MELLITUS WITH OTHER SPECIFIED COMPLICATION, WITHOUT LONG-TERM CURRENT USE OF INSULIN (HCC): Primary | ICD-10-CM

## 2024-01-29 PROCEDURE — 99215 OFFICE O/P EST HI 40 MIN: CPT | Performed by: STUDENT IN AN ORGANIZED HEALTH CARE EDUCATION/TRAINING PROGRAM

## 2024-01-29 PROCEDURE — 3079F DIAST BP 80-89 MM HG: CPT | Performed by: STUDENT IN AN ORGANIZED HEALTH CARE EDUCATION/TRAINING PROGRAM

## 2024-01-29 PROCEDURE — 3077F SYST BP >= 140 MM HG: CPT | Performed by: STUDENT IN AN ORGANIZED HEALTH CARE EDUCATION/TRAINING PROGRAM

## 2024-01-29 NOTE — PATIENT INSTRUCTIONS
Return Visit   [ x ] Physician in 3 months   [ x ] In person or video visit  [  ] In person only    [ x ] After visit summary        It was great seeing you today!    Today we discussed your diabetes:  -Please message me on 2/7 to review your mary jane report. Based on this additional data, I will be able to make further recommendations  -Please repeat your urine microalbumin test in the morning. Based on your results, I will let you know if I recommend you see a nephrologist   -Please see your ophthalmologist at your earliest convenience     Take care!  -Dr. Raymundo

## 2024-01-29 NOTE — PROGRESS NOTES
ENDOCRINOLOGY, DIABETES & METABOLISM    Name: Juan James  MR: [id number]  Date: January 29, 2024   Referring Physician: No ref. provider found    Subjective    HISTORY OF PRESENT ILLNESS:  Juan James is a 58 year oldM  seen in consultation for his Type 2 Diabetes on October 30, 2023.     Diabetes was diagnosed around 2019.   Polyuria/polydipsia:  No  Blurred vision:  No  HbA1c 7.7 6/2023 > 6.9 10/2023   Farxiga for a few years   Amputation of toe 6/2023 and 10/2023    Diabetes Treatment history   Duration of therapy with insulin: n/a    Current Regimen for diabetes:   Oral/Injectable medications: Farxiga 10mg daily   Missed doses: denies    Interval Hx 1/29/2024  -Compliant with farxiga 10mg daily   -Pt would like to discuss diabetic diet further with Thornton  -He has not had an eye exam for some time  -Is going for first metatarsal phalangeal joint fusion with plate and screws, left foot and PIP joint fusion of the third toe with pin fixation left foot 2/2024  -Continued on rosuvastatin 20 mg daily, compliant   -On 2 anti-hypertensive agents, compliant. No hx of low potassium     Complications  Neuropathy: Yes:   Follows with neurology: No    Nephropathy: No Last MCR: not on file  Follows with nephrology: No   Retinopathy: No Last Ophthalmology Visit:1.5-2 years; due       CAD/ HLD: Yes: HLD On Statin: Yes: Rosuvastatin 20mg    Hypertension: Yes:    On ACEi: Yes: Valsartan    Stroke/TIA: No On Aspirin: No    Skin Ulcer/Infection: Yes: left foot      Follows with Podiatry: Yes:   Last foot exam: Foot/Ankle Musculoskeletal Exam            Meal                      Recall                                        Breakfast    omelette, coffee with cream and sugar, english muffin /cheese  AM Snackcarrots and celery with ranch   Lunch            salad, leftover from dinner   PM Snackchips and salsa/crackers and dip   Dinner  fish, pork, vegetables (zucchini, squash, carrots, potatoes), brown rice   * tries to stop eating after 7 PM *     Snack Before Bed? No   Soda/Juice Drinker? No   Number of restaurant or fast food meals/week: 2x/week but eating salads etc   Exercise: Walk at work, no dedicated exercise   Has been working on portion control    Potential CI to medications:  GERD: No  UTI/urinary frequency:No  H/o Pancreatitis: No  FHx of Thyroid cancer: No  CAD/CHF: No    ALLERGIES, PAST MEDICAL HISTORY, SOCIAL HISTORY, FAMILY HISTORY reviewed and updated in [facility] as appropriate January 29, 2024     CURRENT MEDICATIONS:    amLODIPine 10 MG Oral Tab Take 1 tablet (10 mg total) by mouth daily.      Continuous Blood Gluc Sensor ([employer]YLE NIKHIL 14 DAY SENSOR) Does not apply Misc USE TO CHECK FASTING BLOOD SUGAR IN THE MORNING AND 2 HOURS AFTER EACH MEAL DAILY AS DIRECTED      dapagliflozin (FARXIGA) 10 MG Oral Tab Take 15 mg by mouth daily.      Meloxicam 15 MG Oral Tab Take 1 tablet (15 mg total) by mouth daily.      valsartan 160 MG Oral Tab Take 2 tablets (320 mg total) by mouth daily.      rosuvastatin 20 MG Oral Tab Take 1 tablet (20 mg total) by mouth nightly.       REVIEW OF SYSTEMS: Pertinent positives documented above in HPI    Objective    PHYSICAL EXAMINATION  Wt Readings from Last 3 Encounters:   01/25/24 232 lb (105.2 kg)   10/30/23 232 lb (105.2 kg)   10/16/23 229 lb 15 oz (104.3 kg)      BMI Readings from Last 3 Encounters:   01/25/24 31.46 kg/m²   10/30/23 31.46 kg/m²   10/16/23 31.19 kg/m²     Vital Signs:   Vitals:    01/29/24 1458   BP: 144/84   Pulse: 67   SpO2: 99%     There is no height or weight on file to calculate BMI.   General Appearance:  Alert, in no acute distress, well developed, obese gentleman   Eyes:  normal conjunctivae, sclera  Ears/Nose/Mouth/Throat/Neck:  normal hearing, normal speech and no palpable thyroid nodules  Respiratory:  breathing comfortably on room air, clear to auscultation bilaterally  Cardiovascular:  regular rate and rhythm, no murmurs, S3 or S4, mild  peripheral edema  Psychiatric:  Oriented to person, place and time, appropriate mood & affect  Skin: Normal moisture and skin texture  Neuro: sensory grossly intact, motor grossly intact.      Recent Labs: Independently reviewed labs on January 29, 2024  in [facility] under lab tab.  Interpretation: A1c at goal  Diabetic Labs:   Last A1c value was 7% done 12/29/2023.   Microalbumin not on file    Diabetic Health Maintenance:  Due for eye exam   Foot exam: Up to date early this year (2023)        Continuous Glucose Monitoring Interpretation  Juan James has undergone continuous glucose monitoring with the [employer] Tran CGM.  The blood glucose tracings were evaluated for two weeks prior to office visit. Has not used it since he thought he was getting surgery.          Radiology:  Independently interpreted testing on January 29, 2024   These revealed: no stenosis of left lower leg arteries  6/2023 Arterial duplex:   Impression   CONCLUSION:  No significant stenosis or occlusion is seen within the left lower leg arteries as above.             ASSESSMENT & PLAN  Juan James is a 58 year oldM  seen in consultation for:    Controlled Type 2 Diabetes   Importance of better glucose control in preventing progression of end-organ damage discussed, as well as, goals of therapy and clinical significance of A1c   Continue Farxiga 10 mg daily. Patient has been on this medication for the last 2-3 years. Last documented in 2021. 6/2023 vascular duplex without PAD  Of note, the CANVAS and CANVAS-R trials reported a two-fold increase in the occurrence of lower limb amputations (LLAs) in participants receiving canagliflozin. These were predominantly toe or metatarsal amputations and they occurred in individuals without established peripheral vascular disease prior to study commencement. These findings prompted the [place] Food and Drug Administration (FDA) to issue a caution on the use of canagliflozin in  individuals at risk of amputation. Other SGLT2i have not been associated with an increased risk of LLA.   We discussed the importance of SBGM and consistent, low carb diet as well as moderate exercise as well once patient is cleared by podiatry.  We also discussed the complications of diabetes include retinopathy, neuropathy, nephropathy and cardiovascular disease. Pt would like to discuss diabetic diet further with Hillary  Pt to message me on 2/7 to review his mary jane report. Based on this additional data, I will be able to make further recommendations regarding metformin vs glp-1 ra  Pt to repeat urine microalbumin test in the morning. Based on results, I will let him know if I recommend following with a nephrologist   Surveillance for Complications & Risks  oHgA1c (goal <7%): yes  oRenal: microalbumin: elevated on 12/2023   oBlood Pressure (goal <130/80):  No  oOptho: Eye screening (yearly): Due   oNeuro: Encouraged proper footwear and regular visits with podiatry. Foot exam (yearly): Yes, 2023  oCV: hx of HLD -> LDL 80 (goal: LDL<100),  on 10/2023   ?Taking ASA(eg, age >40, cigarette smoking, HTN, obesity, albuminuria, HLD, or a FHx of CAD) : Not taking ASA     Hypertension  BP above goal this visit. Endorses compliance with vaslartan. Will follow malb    Hyperlipidemia  LDL 80 10/2023 on rosuvastatin 20mg daily. Is making active changes to diet and will repeat lipid panel in 6 months around 4/2024    Nutrition:   Vitamin B12, Vitamin D3 as needed  Recommend chewable multivitamin daily.   Advised whole food plant based diet      Interventions Discussed    Goals: Promote healthy eating, routine exercise/activity.   1. Patient aware of the adverse effects of suboptimal glucose control and goals of therapy  2. Reinforced timing and compliance with medication, SMBG and routine follow up       The above assessment and plan was discussed with the patient. The patient noted understanding and agreement with the plan  listed above. The patient is aware to contact the office if further concerns arise.  I have reviewed prior external note(s) from each unique source (PCP and other specialists), reviewed results/ordered further testing.     Return in about 3 months (around 4/29/2024) for diabetes and f/u microalbumin .     Visit Duration : A total of 40 minutes was spent today on obtaining history, reviewing pertinent labs, evaluating patient, providing multiple treatment options, reinforcing diet/exercise and compliance, and completing documentation.     Note to patient: The 21 Century Cures Act makes medical notes like these available to patients in the interest of transparency. However, be advised this is a medical document. It is intended as peer to peer communication. It is written in medical language and may contain abbreviations or verbiage that are unfamiliar. It may appear blunt or direct. Medical documents are intended to carry relevant information, facts as evident, and the clinical opinion of the practitioner.    Nery Raymundo DO  Endocrinology, Diabetes & Metabolism   1/29/2024

## 2024-02-01 DIAGNOSIS — M20.12 HALLUX VALGUS OF LEFT FOOT: Primary | ICD-10-CM

## 2024-02-01 DIAGNOSIS — M20.42 HAMMER TOES OF BOTH FEET: ICD-10-CM

## 2024-02-01 DIAGNOSIS — M20.41 HAMMER TOES OF BOTH FEET: ICD-10-CM

## 2024-02-02 ENCOUNTER — LAB ENCOUNTER (OUTPATIENT)
Dept: LAB | Age: 59
End: 2024-02-02
Attending: STUDENT IN AN ORGANIZED HEALTH CARE EDUCATION/TRAINING PROGRAM
Payer: COMMERCIAL

## 2024-02-02 ENCOUNTER — TELEPHONE (OUTPATIENT)
Dept: PODIATRY CLINIC | Facility: CLINIC | Age: 59
End: 2024-02-02

## 2024-02-02 DIAGNOSIS — E11.69 TYPE 2 DIABETES MELLITUS WITH OTHER SPECIFIED COMPLICATION, WITHOUT LONG-TERM CURRENT USE OF INSULIN (HCC): ICD-10-CM

## 2024-02-02 LAB
CREAT UR-SCNC: 85.7 MG/DL
MICROALBUMIN UR-MCNC: 234 MG/DL
MICROALBUMIN/CREAT 24H UR-RTO: 2730.5 UG/MG (ref ?–30)

## 2024-02-02 PROCEDURE — 82570 ASSAY OF URINE CREATININE: CPT

## 2024-02-02 PROCEDURE — 82043 UR ALBUMIN QUANTITATIVE: CPT

## 2024-02-02 NOTE — TELEPHONE ENCOUNTER
RTW and FMLA ([employer]) received at Forms Department on 2/2/2024. Missing COCO/FCR - sent PT a [employer] message.

## 2024-02-05 NOTE — TELEPHONE ENCOUNTER
FMLA forms for pt wife to care for patient received. Additional [employer] message sent for Auth / COCO (marked 2 of 2). Logged for processing.

## 2024-02-06 ENCOUNTER — EKG ENCOUNTER (OUTPATIENT)
Dept: LAB | Age: 59
End: 2024-02-06
Attending: PODIATRIST
Payer: COMMERCIAL

## 2024-02-06 ENCOUNTER — PATIENT MESSAGE (OUTPATIENT)
Facility: CLINIC | Age: 59
End: 2024-02-06

## 2024-02-06 DIAGNOSIS — Z01.818 PRE-OP TESTING: ICD-10-CM

## 2024-02-06 DIAGNOSIS — E11.69 TYPE 2 DIABETES MELLITUS WITH OTHER SPECIFIED COMPLICATION, WITHOUT LONG-TERM CURRENT USE OF INSULIN (HCC): ICD-10-CM

## 2024-02-06 DIAGNOSIS — R80.9 URINE TEST POSITIVE FOR MICROALBUMINURIA: Primary | ICD-10-CM

## 2024-02-06 LAB
ATRIAL RATE: 67 BPM
P AXIS: 50 DEGREES
P-R INTERVAL: 168 MS
Q-T INTERVAL: 384 MS
QRS DURATION: 86 MS
QTC CALCULATION (BEZET): 405 MS
R AXIS: -9 DEGREES
T AXIS: 34 DEGREES
VENTRICULAR RATE: 67 BPM

## 2024-02-06 PROCEDURE — 93010 ELECTROCARDIOGRAM REPORT: CPT | Performed by: INTERNAL MEDICINE

## 2024-02-06 PROCEDURE — 93005 ELECTROCARDIOGRAM TRACING: CPT

## 2024-02-06 NOTE — TELEPHONE ENCOUNTER
From: Juan James  To: Nery Raymundo  Sent: 2/6/2024 2:01 PM CST  Subject: Urine results    Yes that would be fine but i’m having surgery Friday the 9th not sure how much I’ll get around

## 2024-02-06 NOTE — TELEPHONE ENCOUNTER
Thank you for updating me. I have placed the referral so he can see them whenever he is able.   I am not sure if they can do video visit etc to accommodate him post-op, he would have to reach out to their .

## 2024-02-06 NOTE — TELEPHONE ENCOUNTER
Patient sent MCM in response to Dr. Raymundo note: Thank you for repeating your urine studies like we discussed. Since your urine studies continue to show an elevated level of microalbumin I would recommend that you see the kidney doctor for further evaluatioin. Please let me know if you have any questions otherwise I will go ahead an place the referral order.     Routed to Dr. Raymundo for review.

## 2024-02-09 ENCOUNTER — HOSPITAL ENCOUNTER (OUTPATIENT)
Facility: HOSPITAL | Age: 59
Setting detail: HOSPITAL OUTPATIENT SURGERY
Discharge: HOME | End: 2024-02-09
Attending: PODIATRIST | Admitting: PODIATRIST
Payer: COMMERCIAL

## 2024-02-09 ENCOUNTER — ANESTHESIA EVENT (OUTPATIENT)
Dept: SURGERY | Facility: HOSPITAL | Age: 59
End: 2024-02-09
Payer: COMMERCIAL

## 2024-02-09 ENCOUNTER — APPOINTMENT (OUTPATIENT)
Dept: GENERAL RADIOLOGY | Facility: HOSPITAL | Age: 59
End: 2024-02-09
Attending: PODIATRIST
Payer: COMMERCIAL

## 2024-02-09 ENCOUNTER — TELEPHONE (OUTPATIENT)
Dept: PODIATRY CLINIC | Facility: CLINIC | Age: 59
End: 2024-02-09

## 2024-02-09 ENCOUNTER — ANESTHESIA (OUTPATIENT)
Dept: SURGERY | Facility: HOSPITAL | Age: 59
End: 2024-02-09
Payer: COMMERCIAL

## 2024-02-09 VITALS
TEMPERATURE: 97 F | HEART RATE: 61 BPM | DIASTOLIC BLOOD PRESSURE: 75 MMHG | SYSTOLIC BLOOD PRESSURE: 154 MMHG | WEIGHT: 230.63 LBS | RESPIRATION RATE: 12 BRPM | OXYGEN SATURATION: 98 % | HEIGHT: 72 IN | BODY MASS INDEX: 31.24 KG/M2

## 2024-02-09 DIAGNOSIS — M79.672 ACUTE POSTOPERATIVE PAIN OF LEFT FOOT: ICD-10-CM

## 2024-02-09 DIAGNOSIS — G89.18 ACUTE POSTOPERATIVE PAIN OF LEFT FOOT: ICD-10-CM

## 2024-02-09 DIAGNOSIS — Z98.890 STATUS POST FOOT SURGERY: ICD-10-CM

## 2024-02-09 DIAGNOSIS — Z98.890 STATUS POST FOOT SURGERY: Primary | ICD-10-CM

## 2024-02-09 DIAGNOSIS — Z01.818 PRE-OP TESTING: Primary | ICD-10-CM

## 2024-02-09 LAB
GLUCOSE BLD-MCNC: 110 MG/DL (ref 70–99)
GLUCOSE BLD-MCNC: 87 MG/DL (ref 70–99)

## 2024-02-09 PROCEDURE — 82962 GLUCOSE BLOOD TEST: CPT

## 2024-02-09 PROCEDURE — 0SGN04Z FUSION OF LEFT METATARSAL-PHALANGEAL JOINT WITH INTERNAL FIXATION DEVICE, OPEN APPROACH: ICD-10-PCS | Performed by: PODIATRIST

## 2024-02-09 PROCEDURE — 76000 FLUOROSCOPY <1 HR PHYS/QHP: CPT | Performed by: PODIATRIST

## 2024-02-09 DEVICE — IMPLANTABLE DEVICE: Type: IMPLANTABLE DEVICE | Site: FOOT | Status: FUNCTIONAL

## 2024-02-09 DEVICE — WIRE FX .062IN 4IN KRSH SS 2: Type: IMPLANTABLE DEVICE | Site: FOOT | Status: FUNCTIONAL

## 2024-02-09 DEVICE — IMPLANTABLE DEVICE: Type: IMPLANTABLE DEVICE | Site: FOOT

## 2024-02-09 RX ORDER — NICOTINE POLACRILEX 4 MG
30 LOZENGE BUCCAL
Status: DISCONTINUED | OUTPATIENT
Start: 2024-02-09 | End: 2024-02-09 | Stop reason: HOSPADM

## 2024-02-09 RX ORDER — HYDROCODONE BITARTRATE AND ACETAMINOPHEN 5; 325 MG/1; MG/1
1 TABLET ORAL ONCE AS NEEDED
Status: DISCONTINUED | OUTPATIENT
Start: 2024-02-09 | End: 2024-02-09

## 2024-02-09 RX ORDER — ACETAMINOPHEN 500 MG
1000 TABLET ORAL ONCE AS NEEDED
Status: DISCONTINUED | OUTPATIENT
Start: 2024-02-09 | End: 2024-02-09

## 2024-02-09 RX ORDER — ACETAMINOPHEN 500 MG
1000 TABLET ORAL ONCE
Status: DISCONTINUED | OUTPATIENT
Start: 2024-02-09 | End: 2024-02-09 | Stop reason: HOSPADM

## 2024-02-09 RX ORDER — DEXTROSE MONOHYDRATE 25 G/50ML
50 INJECTION, SOLUTION INTRAVENOUS
Status: DISCONTINUED | OUTPATIENT
Start: 2024-02-09 | End: 2024-02-09 | Stop reason: HOSPADM

## 2024-02-09 RX ORDER — NALOXONE HYDROCHLORIDE 0.4 MG/ML
0.08 INJECTION, SOLUTION INTRAMUSCULAR; INTRAVENOUS; SUBCUTANEOUS AS NEEDED
Status: DISCONTINUED | OUTPATIENT
Start: 2024-02-09 | End: 2024-02-09

## 2024-02-09 RX ORDER — HYDROMORPHONE HYDROCHLORIDE 1 MG/ML
0.6 INJECTION, SOLUTION INTRAMUSCULAR; INTRAVENOUS; SUBCUTANEOUS EVERY 5 MIN PRN
Status: DISCONTINUED | OUTPATIENT
Start: 2024-02-09 | End: 2024-02-09

## 2024-02-09 RX ORDER — HYDROMORPHONE HYDROCHLORIDE 1 MG/ML
0.4 INJECTION, SOLUTION INTRAMUSCULAR; INTRAVENOUS; SUBCUTANEOUS EVERY 5 MIN PRN
Status: DISCONTINUED | OUTPATIENT
Start: 2024-02-09 | End: 2024-02-09

## 2024-02-09 RX ORDER — HYDROCODONE BITARTRATE AND ACETAMINOPHEN 5; 325 MG/1; MG/1
1 TABLET ORAL EVERY 6 HOURS PRN
Qty: 30 TABLET | Refills: 0 | Status: SHIPPED | OUTPATIENT
Start: 2024-02-09 | End: 2024-02-09

## 2024-02-09 RX ORDER — LIDOCAINE HYDROCHLORIDE 10 MG/ML
INJECTION, SOLUTION EPIDURAL; INFILTRATION; INTRACAUDAL; PERINEURAL AS NEEDED
Status: DISCONTINUED | OUTPATIENT
Start: 2024-02-09 | End: 2024-02-09 | Stop reason: SURG

## 2024-02-09 RX ORDER — PROCHLORPERAZINE EDISYLATE 5 MG/ML
5 INJECTION INTRAMUSCULAR; INTRAVENOUS EVERY 8 HOURS PRN
Status: DISCONTINUED | OUTPATIENT
Start: 2024-02-09 | End: 2024-02-09

## 2024-02-09 RX ORDER — HYDROCODONE BITARTRATE AND ACETAMINOPHEN 5; 325 MG/1; MG/1
2 TABLET ORAL ONCE AS NEEDED
Status: DISCONTINUED | OUTPATIENT
Start: 2024-02-09 | End: 2024-02-09

## 2024-02-09 RX ORDER — ONDANSETRON 2 MG/ML
INJECTION INTRAMUSCULAR; INTRAVENOUS AS NEEDED
Status: DISCONTINUED | OUTPATIENT
Start: 2024-02-09 | End: 2024-02-09 | Stop reason: SURG

## 2024-02-09 RX ORDER — SCOLOPAMINE TRANSDERMAL SYSTEM 1 MG/1
1 PATCH, EXTENDED RELEASE TRANSDERMAL ONCE
Status: DISCONTINUED | OUTPATIENT
Start: 2024-02-09 | End: 2024-02-09 | Stop reason: HOSPADM

## 2024-02-09 RX ORDER — HYDROMORPHONE HYDROCHLORIDE 1 MG/ML
0.2 INJECTION, SOLUTION INTRAMUSCULAR; INTRAVENOUS; SUBCUTANEOUS EVERY 5 MIN PRN
Status: DISCONTINUED | OUTPATIENT
Start: 2024-02-09 | End: 2024-02-09

## 2024-02-09 RX ORDER — SODIUM CHLORIDE, SODIUM LACTATE, POTASSIUM CHLORIDE, CALCIUM CHLORIDE 600; 310; 30; 20 MG/100ML; MG/100ML; MG/100ML; MG/100ML
INJECTION, SOLUTION INTRAVENOUS CONTINUOUS
Status: DISCONTINUED | OUTPATIENT
Start: 2024-02-09 | End: 2024-02-09

## 2024-02-09 RX ORDER — CEFAZOLIN SODIUM/WATER 2 G/20 ML
2 SYRINGE (ML) INTRAVENOUS ONCE
Status: COMPLETED | OUTPATIENT
Start: 2024-02-09 | End: 2024-02-09

## 2024-02-09 RX ORDER — NICOTINE POLACRILEX 4 MG
15 LOZENGE BUCCAL
Status: DISCONTINUED | OUTPATIENT
Start: 2024-02-09 | End: 2024-02-09 | Stop reason: HOSPADM

## 2024-02-09 RX ORDER — ONDANSETRON 2 MG/ML
4 INJECTION INTRAMUSCULAR; INTRAVENOUS EVERY 6 HOURS PRN
Status: DISCONTINUED | OUTPATIENT
Start: 2024-02-09 | End: 2024-02-09

## 2024-02-09 RX ORDER — HYDROCODONE BITARTRATE AND ACETAMINOPHEN 5; 325 MG/1; MG/1
1 TABLET ORAL EVERY 4 HOURS PRN
Qty: 30 TABLET | Refills: 0 | Status: SHIPPED | OUTPATIENT
Start: 2024-02-09

## 2024-02-09 RX ORDER — BUPIVACAINE HYDROCHLORIDE 5 MG/ML
INJECTION, SOLUTION EPIDURAL; INTRACAUDAL AS NEEDED
Status: DISCONTINUED | OUTPATIENT
Start: 2024-02-09 | End: 2024-02-09 | Stop reason: HOSPADM

## 2024-02-09 RX ORDER — AMOXICILLIN AND CLAVULANATE POTASSIUM 875; 125 MG/1; MG/1
1 TABLET, FILM COATED ORAL 2 TIMES DAILY
Qty: 20 TABLET | Refills: 0 | Status: SHIPPED | OUTPATIENT
Start: 2024-02-09 | End: 2024-02-22 | Stop reason: ALTCHOICE

## 2024-02-09 RX ORDER — CEFAZOLIN SODIUM/WATER 2 G/20 ML
SYRINGE (ML) INTRAVENOUS
Status: DISCONTINUED
Start: 2024-02-09 | End: 2024-02-09

## 2024-02-09 RX ADMIN — ONDANSETRON 4 MG: 2 INJECTION INTRAMUSCULAR; INTRAVENOUS at 13:31:00

## 2024-02-09 RX ADMIN — SODIUM CHLORIDE, SODIUM LACTATE, POTASSIUM CHLORIDE, CALCIUM CHLORIDE: 600; 310; 30; 20 INJECTION, SOLUTION INTRAVENOUS at 15:20:00

## 2024-02-09 RX ADMIN — CEFAZOLIN SODIUM/WATER 2 G: 2 G/20 ML SYRINGE (ML) INTRAVENOUS at 13:30:00

## 2024-02-09 RX ADMIN — SODIUM CHLORIDE, SODIUM LACTATE, POTASSIUM CHLORIDE, CALCIUM CHLORIDE: 600; 310; 30; 20 INJECTION, SOLUTION INTRAVENOUS at 13:17:00

## 2024-02-09 RX ADMIN — LIDOCAINE HYDROCHLORIDE 50 MG: 10 INJECTION, SOLUTION EPIDURAL; INFILTRATION; INTRACAUDAL; PERINEURAL at 13:21:00

## 2024-02-09 NOTE — TELEPHONE ENCOUNTER
LMTCB need details. Forms for spouse.    Type of Leave:   FMLA/Disab  Reason for Leave: Sx on Left Foot  Start date of leave:   02-09-24 - 6 weeks  How much time needed?:   6 weeks  Forms Due Date:  Was Fee and Turnaround info Given?:

## 2024-02-09 NOTE — ANESTHESIA POSTPROCEDURE EVALUATION
[facility]    Juan James Patient Status:  Hospital Outpatient Surgery   Age/Gender 58 year old male MRN [id number]   Location [facility] SURGERY Attending Porfirio Felix DPM   Hosp Day # 0 PCP Natalie Kenny DO       Anesthesia Post-op Note    metatarsal phalangeal joint fusion left foot with plate and screw fixation, fusion of the second toe with pin fixation left foot    Procedure Summary       Date: 02/09/24 Room / Location:  MAIN OR 14 / [facility] MAIN OR    Anesthesia Start: 1314 Anesthesia Stop: 1530    Procedure: metatarsal phalangeal joint fusion left foot with plate and screw fixation, fusion of the second toe with pin fixation left foot (Left: Foot) Diagnosis:       Hallux valgus of left foot      Hammer toes of both feet      (Hallux valgus of left foot [M20.12]Hammer toes of both feet [M20.41, M20.42])    Surgeons: Porfirio Felix DPM Anesthesiologist: Didier Acosta MD    Anesthesia Type: general ASA Status: 2            Anesthesia Type: general    Vitals Value Taken Time   /73 02/09/24 1538   Temp 97.8 02/09/24 1538   Pulse 60 02/09/24 1538   Resp 18 02/09/24 1538   SpO2 98% 02/09/24 1538       Patient Location: Same Day Surgery    Anesthesia Type: MAC    Airway Patency: patent    Postop Pain Control: adequate    Mental Status: mildly sedated but able to meaningfully participate in the post-anesthesia evaluation    Nausea/Vomiting: none    Cardiopulmonary/Hydration status: stable euvolemic    Complications: no apparent anesthesia related complications    Postop vital signs: stable    Dental Exam: Unchanged from Preop    Patient to be discharged home when criteria met.

## 2024-02-09 NOTE — ANESTHESIA PREPROCEDURE EVALUATION
PRE-OP EVALUATION    Patient Name: Juan James    Admit Diagnosis: Hallux valgus of left foot [M20.12]  Hammer toes of both feet [M20.41, M20.42]    Pre-op Diagnosis: Hallux valgus of left foot [M20.12]  Hammer toes of both feet [M20.41, M20.42]    metatarsal phalangeal joint fusion left foot with plate and screw fixation, fusion of the second toe with pin fixation left foot    Anesthesia Procedure: metatarsal phalangeal joint fusion left foot with plate and screw fixation, fusion of the second toe with pin fixation left foot (Left)    Surgeon(s) and Role:     * Porfirio Felix DPM - Primary    Pre-op vitals reviewed.  Temp: 98 °F (36.7 °C)  Pulse: 64  Resp: 12  BP: 168/90  SpO2: 100 %  Body mass index is 31.28 kg/m².    Current medications reviewed.  Hospital Medications:   [MAR Hold] acetaminophen (Tylenol Extra Strength) tab 1,000 mg  1,000 mg Oral Once    [MAR Hold] scopolamine (Transderm-Scop) 1 MG/3DAYS patch 1 patch  1 patch Transdermal Once    [MAR Hold] glucose (Dex4) 15 GM/59ML oral liquid 15 g  15 g Oral Q15 Min PRN    Or    [MAR Hold] glucose (Glutose) 40% oral gel 15 g  15 g Oral Q15 Min PRN    Or    [MAR Hold] glucose-vitamin C (Dex-4) chewable tab 4 tablet  4 tablet Oral Q15 Min PRN    Or    [MAR Hold] dextrose 50% injection 50 mL  50 mL Intravenous Q15 Min PRN    Or    [MAR Hold] glucose (Dex4) 15 GM/59ML oral liquid 30 g  30 g Oral Q15 Min PRN    Or    [MAR Hold] glucose (Glutose) 40% oral gel 30 g  30 g Oral Q15 Min PRN    Or    [MAR Hold] glucose-vitamin C (Dex-4) chewable tab 8 tablet  8 tablet Oral Q15 Min PRN    lactated ringers infusion   Intravenous Continuous    ceFAZolin (Ancef) 2 g in 20mL IV syringe premix  2 g Intravenous Once    ceFAZolin (Ancef) 2 g/20mL IV syringe premix           Outpatient Medications:     Medications Prior to Admission   Medication Sig Dispense Refill Last Dose    amLODIPine 10 MG Oral Tab Take 1 tablet (10 mg total) by mouth daily.   2/4/2024     Continuous Blood Gluc Sensor ([employer] NIKHIL 14 DAY SENSOR) Does not apply Misc USE TO CHECK FASTING BLOOD SUGAR IN THE MORNING AND 2 HOURS AFTER EACH MEAL DAILY AS DIRECTED   2/9/2024    dapagliflozin (FARXIGA) 10 MG Oral Tab Take 15 mg by mouth daily.   2/4/2024    Meloxicam 15 MG Oral Tab Take 1 tablet (15 mg total) by mouth daily.   2/4/2024 at 0800    valsartan 160 MG Oral Tab Take 2 tablets (320 mg total) by mouth daily.   2/8/2024 at 0530    rosuvastatin 20 MG Oral Tab Take 1 tablet (20 mg total) by mouth nightly.   2/4/2024       Allergies: Patient has no known allergies.      Anesthesia Evaluation    Patient summary reviewed.    Anesthetic Complications  (-) history of anesthetic complications         GI/Hepatic/Renal      (-) GERD                           Cardiovascular        Exercise tolerance: good     MET: >4    (-) obesity  (+) hypertension     (-) CAD                  (-) angina     (-) ARGUELLO         Endo/Other      (+) diabetes  type 2,                          Pulmonary      (-) asthma  (-) COPD       (-) shortness of breath     (+) sleep apnea       Neuro/Psych                 (+) neuromuscular disease             Patient Active Problem List:     Plantar fascial fibromatosis     Special screening for malignant neoplasm of colon     Polyp of colon     Diabetic foot ulcer (HCC)     Type 2 diabetes mellitus with other specified complication (HCC)     Chronic kidney disease, unspecified CKD stage     Diabetic peripheral neuropathy (HCC)     Osteomyelitis of fourth toe of left foot (HCC)            Past Surgical History:   Procedure Laterality Date    HERNIA SURGERY      OTHER SURGICAL HISTORY  06/2023    2nd toe of left foot amputated    SHOULDER SURG PROC UNLISTED       Social History     Socioeconomic History    Marital status:    Tobacco Use    Smoking status: Former     Types: Cigars, Cigarettes    Smokeless tobacco: Never   Vaping Use    Vaping Use: Never used   Substance and Sexual  Activity    Alcohol use: Yes     Alcohol/week: 2.0 standard drinks of alcohol     Types: 2 Cans of beer per week     Comment: social    Drug use: No     History   Drug Use No     Available pre-op labs reviewed.  Lab Results   Component Value Date    WBC 6.9 12/29/2023    RBC 4.00 (L) 12/29/2023    HGB 11.7 (L) 12/29/2023    HCT 36.9 (L) 12/29/2023    MCV 92.3 12/29/2023    MCH 29.3 12/29/2023    MCHC 31.7 12/29/2023    RDW 13.1 12/29/2023    .0 12/29/2023     Lab Results   Component Value Date     12/29/2023    K 4.9 12/29/2023     (H) 12/29/2023    CO2 26.0 12/29/2023    BUN 37 (H) 12/29/2023    CREATSERUM 1.32 (H) 12/29/2023     (H) 12/29/2023    CA 9.2 12/29/2023            Airway      Mallampati: II  Mouth opening: >3 FB  TM distance: 4 - 6 cm  Neck ROM: full Cardiovascular    Cardiovascular exam normal.  Rhythm: regular  Rate: normal     Dental  Comment: Patient denies any loose/missing/cracked teeth. No gross abnormalities or loose teeth noted on exam.      Dentition appears grossly intact         Pulmonary    Pulmonary exam normal.                 Other findings              ASA: 2   Plan: MAC  NPO status verified and patient meets guidelines.    Post-procedure pain management plan discussed with surgeon and patient.    Comment:     A detailed discussion about the anesthetic plan was held with Juan James in the preoperative area. Benefits and risks of MAC anesthesia were discussed, including intraoperative awareness/recall, PONV, reasonable expectations of post-operative pain/discomfort, aspiration, conversion to general anesthesia, dental injury, pressure/nerve injuries from positioning, and other serious but rare complications (life-threatening cardiopulmonary events). All questions were answered appropriately and patient demonstrated understanding of realistic expectations and risks of undergoing anesthesia. Juan James consents to receiving anesthesia  and wishes to proceed.  Plan/risks discussed with: patient                Present on Admission:  **None**

## 2024-02-09 NOTE — BRIEF OP NOTE
Pre-Operative Diagnosis: Hallux valgus of left foot [M20.12]  Hammer toes of both feet [M20.41, M20.42]     Post-Operative Diagnosis: Hallux valgus of left foot [M20.12]Hammer toes of both feet [M20.41, M20.42]      Procedure Performed:   metatarsal phalangeal joint fusion left foot with plate and screw fixation, fusion of the second toe with pin fixation left foot    Surgeon(s) and Role:     * Porfirio Felix DPM - Primary    Assistant(s):        Surgical Findings: The first ray was elongated and osteoarthritic changes noted to the first metatarsal head about 60% of the surface with superficial erosions.  The head of the proximal phalanx of the second toe was showing deviation plantarly of the articular surface.     Specimen: None     Estimated Blood Loss: Blood Output: 5 mL (2/9/2024  3:12 PM)      Dictation Number: In epic    Porfirio Felix DPM  2/9/2024  3:39 PM

## 2024-02-09 NOTE — DISCHARGE INSTRUCTIONS
Home Care Instructions  Foot Surgeries  Dr DION Felix.    Operative Site(s)   Keep operative area completely dry.   Place and ice bag over the surgical area 10 minutes out of every half hour (for the first 24 hours  after the surgery.   DO NOT USE HOT WATER BAGS OR ELECTIC HEATING PADS ON YOUR FOOT.   Place a pillow under your calf so that the surgical foot is elevated. Throbbing discomfort can be  helped by keeping the foot elevated.   Remain quiet and off your feet for the first 72 hours. Limit walking to you tolerance, unless  instructed otherwise by your physician. Stay off your feet as much as possible.   Your bandages may become somewhat bloody. Should this occur, do not become alarmed. Do  not remove bandages.   Bend knee and rotate foot and ankle at least 5 minutes during each hour after surgery for 24-36  hours while awake.   Wear your surgical shoe/cast brace when walking, if a surgical shoe/cast brace was dispensed to  you. DO NOT TAKE ANY STEPS WITHOUT THE SHOE/CAST BRACE.   On your return home today, sit sideways in the back seat of the car with the surgical foot  Elevated.    Local Anesthesia - Resume diet at tolerated.    General Anesthesia or Local with Sedation -   Stays in your body about 24 hours.   You may have: headache, soreness and aching, nausea and vomiting, dizziness, tiredness. Sore throat and  hoarseness may be present after general anesthesia only.   Drink liquids and eat light foods. Remain on liquids Only if nausea and vomiting occur. Advance to your  regular diet as tolerated.   No ALCOHOLIC BEVERAGES FOR 24 HOURS.   For the next 24 hours: rest, move cautiously, do not drive or operate equipment, and do not make any  personal or legal decisions.    If prescription pain medication is ordered, fill it immediately -   Take as directed.   Do not take on an empty stomach.   Do not drive or operate equipment.   Do not drink alcohol    Call your doctor for -   Active, persistent bleeding  (call at once), swelling redness at incision site.   Severe pain not controlled by pain medication   Bumping or otherwise injuring your foot or surgical site in ay way (call at once).   Temperature over 101 degrees Fahrenheit.   Persistent nausea and vomiting   Skin rash and general body itchiness.   Persistent throbbing not relieved by elevation and medication.   Nausea and lightheadedness due to medication.   Accidentally getting bandage wet. Dry immediately with absorbent towel, then call the office.   Any other problem not discussed in these instructions.    IN CASE OF EMERGENCY, CALL 911 OR GO TO THE NEAREST EMERGENCY ROOM.    Post -op Appointment -  You will need to make an appointment to see the physician in his office this coming Thursday.    If you have any questions, please call [phone]

## 2024-02-10 ENCOUNTER — TELEPHONE (OUTPATIENT)
Dept: PODIATRY CLINIC | Facility: CLINIC | Age: 59
End: 2024-02-10

## 2024-02-10 ENCOUNTER — TELEPHONE (OUTPATIENT)
Dept: ORTHOPEDICS CLINIC | Facility: CLINIC | Age: 59
End: 2024-02-10

## 2024-02-10 NOTE — TELEPHONE ENCOUNTER
Duplicate [employer] FMLA forms rcvd from patient for Dr Felix in Forms Dept. Placed in archived folder

## 2024-02-10 NOTE — TELEPHONE ENCOUNTER
Procedure date:2/9/2024    Procedure Laterality Anesthesia   metatarsal phalangeal joint fusion left foot with plate and screw fixation, fusion of the second toe with pin fixation left foot          How are you feeling? / I'm doing well  Any bleeding? / no   Is the dressing dry & intact?/ yes   Level of pain? / 5  Character of pain:/ sensation of throbbing with his neuropathy     Onset of pain:/ this AM  Aggravating factors:/ hanging foot down. Uses his scooter  Duration of pain:/ short periods  Alleviating factors:/ elevation  Are you taking the prescribed medication? / yes  Medication Quantity Refills Start End   amoxicillin clavulanate 875-125 MG Oral Tab         Medication Quantity Refills Start End   HYDROcodone-acetaminophen 5-325 MG Oral Tab       Patient will start using ibuprofen tomorrow alternating with norco  Are you following all of the PO instructions?/ appetite is coming back     Other Comments:/ went over ice protocol and elevation    Follow-up appt  date: / 2/15/2024    Pt was advised if they have any concerns after hours to call our office and they would be directed to on call physician.  /DONE

## 2024-02-13 ENCOUNTER — PATIENT MESSAGE (OUTPATIENT)
Facility: CLINIC | Age: 59
End: 2024-02-13

## 2024-02-13 NOTE — TELEPHONE ENCOUNTER
Reviewed dexcom, in range 90% of the time with 10% high and 0% low or very low   Patient with great glucose control during this 2 week period. Of note, his CGM is active 40% of the time. Patient to continue to to be compliant with dexcom. Follow up as planned.

## 2024-02-13 NOTE — TELEPHONE ENCOUNTER
From: Juan James  To: Nery Raymundo  Sent: 2/13/2024 10:12 AM CST  Subject: Freestyle mary jane     Just a reminder to check my blood sugar chart :)

## 2024-02-13 NOTE — TELEPHONE ENCOUNTER
Last office visit note: -Please message me on 2/7 to review your tran report. Based on this additional data, I will be able to make further recommendations     Patient sent messaged today. Tran view report PDF sent to Dr. Raymundo for review.

## 2024-02-15 ENCOUNTER — OFFICE VISIT (OUTPATIENT)
Dept: PODIATRY CLINIC | Facility: CLINIC | Age: 59
End: 2024-02-15

## 2024-02-15 DIAGNOSIS — Z98.890 STATUS POST FOOT SURGERY: Primary | ICD-10-CM

## 2024-02-16 NOTE — TELEPHONE ENCOUNTER
Patient calling looking for his [employer] forms that he had filled out at the providers office and were sent to us yesterday. I looked at all of Thursday and could not find forms. He will have his son email them to us and call us back.

## 2024-02-18 PROBLEM — Z01.818 PRE-OP TESTING: Status: ACTIVE | Noted: 2019-07-24

## 2024-02-18 NOTE — OPERATIVE REPORT
[facility]     OPERATIVE REPORT    Juan James    [facility] [id number] MRN [id number]    1965 Age 58 year old   Admission Date 2024 Operation Date 20   Attending Physician Yelena att. providers found Operating Physician Porfirio Felix DPM   PCP Natalie Kenny DO             PREOPERATIVE DIAGNOSIS:    Severe hallux valgus with dislocating first metatarsal phalangeal joint and preulcerative area medially, left foot  Rigid hammertoe of the second digit right foot with history of prior distal tip ulceration  POSTOPERATIVE DIAGNOSIS: Same  PROCEDURE:    First metatarsal phalangeal joint fusion with use of Ad.IQ first metatarsal phalangeal joint fusion plate and bone screws, left foot  Proximal inner phalangeal joint fusion second toe left foot with K wire fixation     ANESTHESIA: General with a local block of 0.5% Marcaine plain via first and second ray blocks totaling 20 cc   HEMOSTASIS: Pneumatic ankle tourniquet inflated 250 mmHg following exsanguination with a Jamie's bandage     ESTIMATED BLOOD LOSS: 5 cc     INDICATIONS:  This 58 year old male presented with gentleman had a second toe amputation the neuropathic he developed a hallux valgus deformity very quickly as well as an adjacent hammertoe of the second third digit on his left foot.  The third digit did ulcerate and there is a preulcerative area on the medial aspect of his first metatarsal head x-rays taken in the office also showed that the first metatarsal phalangeal joint was in the process of partial dislocation.  FINDINGS: Consistent with diagnoses     SPECIMENS: None     COMPLICATIONS:  None.     DRAINS: None     OPERATIVE TECHNIQUE:      The patient was brought into the operative vital signs stable and placed in the supine position on the operating table.  With all personnel present all equipment was in the room the representative from [place] and his equipment was in the room timeout was taken there were no additions  deletions or concerns reported.  Intravenous sedation was administered the left foot was anesthetized as above then prepped and draped using usual aseptic technique and hemostasis was achieved as above.  An 8 cm linear incision was made beginning midshaft over the first metatarsal medial to the tendon of extensor hallucis longus following the deformity over the head of the proximal phalanx.  The incision was deepened using both sharp and blunt technique superficial veins were ligated cauterized or retracted as necessary deep vital structures such as neurovascular structures and tendons were underscored and retracted medially and laterally.  Next a c 1dorsally medially and laterally and plantarly utilizing a McGlamry elevator.  They were denuded from the proximal phalanx dorsally medially and laterally.  Next guidewires were placed into the first metatarsal fluoroscopy was used to visualize placement was found to be good utilizing the reaming system the first metatarsal was remodeled into a rounded peg.  This time remodeling the medial eminence was also taken care of with a sagittal saw and bur.  The K wire was then driven through the proximal phalanx visualized and utilizing the reaming system for the proximal phalanx an adjacent hole was made for the peg.  Will check to make sure that all articular cartilage was denuded and the area was drilled with a large K wire and fenestrated.  On both sides.  The areas flushed with Irrisept.  It was temporarily fixated with a K wire reviewed with fluoroscopy and any necessary corrections or cuts in the bone were made at this time and then the fusion site was packed with DBX as well as V toss.  The fusion site was then reduced and the plate was applied utilizing olive wires and fluoroscopy was used to visualize placement adjustments were made until it was satisfactory.  The distal 3 screws which were locking screws were then put in the plate and the olive wire was removed after  the first 1 was placed.  The additional 2 were then placed these were locking screws 16 mm in length.  Attention was then directed proximally where there was a glide hole in the plate.  Using eccentric drilling with the drill hole made most proximal in the glide hole a nonlocking screw was used.  Here the nonlocking screw was inserted while maintaining significant compression on the fusion site and put in until tight until the plate moved more proximally.  Additional 2 screws which were locking were then placed in the plate.  Fluoroscopy was used to visualize correction there was nothing plantar to the first metatarsal head with the sesamoids were.  Fluoroscopy was used to visualize reduction was found to be very good the area was irrigated and the capsular periosteal structures were reapproximated maintained using 2-0 and 3-0 Vicryl suture and the skin edges were reapproximated maintained using 3-0 nylon suture in simple interrupted fashion.  At this point in time the third toe was addressed with 2 converging semielliptical incisions were made centered over the proximal inner phalangeal joint.  The encompass section of skin was discarded.  The skin edges were underscored and retracted medially and laterally the tendon of extensor digitorum longus and its expansion was incised transversely across the head of the proximal phalanx of the third toe.  Underscored and retracted proximally.  The collateral ligaments were severed the head of the proximal phalanx was delivered dorsally into the wound and the articular surface was transected using sagittal saw and using a sagittal saw and a bur formed into a peg.  A 0.062 inch K wire was then driven through the middle and distal phalanges retrograded and the articular surface of the middle phalanx was removed forming a hole utilizing a 4 mm oval bur removing all articular cartilage.  Dry fit was carried out was found to be good the fusion site was flushed reduced and K wire.   Fluoroscopy used to visualize correction was found to be very good.  The tendon of extensor digitorum longus and its expansion was reapproximated and maintained utilizing 3-0 Vicryl suture and the skin edges were reapproximated maintained using 3-0 nylon suture in simple interrupted fashion.  Sterile postoperative dressing consisting of Adaptic sterile gauze 3 inch Kailee and an Ace wrap was applied the pneumatic ankle tourniquet was released and all the digits returned to their uniform pink color and were warm to the touch.  There is splint was applied with an Ace wrap for stability.  The patient tolerated both anesthesia procedure well left the operative vital signs stable and the vascular status of his left foot intact to recovery room via cart.     Porfirio Felix DPM

## 2024-02-19 NOTE — TELEPHONE ENCOUNTER
Dr. Felix,      (3) Signatures needed.     *The ACKNOWLEDGE button has been moved to the top right ribbon*    Please sign off on form if you agree to: Hammertoes of both Feet  FMLA (start date 2/9/24 - 3/22/24),  Disab (start 2/9/24 - 3/22/24. FMLA (Spouse) (start 2/9/24-3/22/24)  (place your signature on the first page only)    -From your Inbasket, Highlight the patient and click Chart   -Double click the 2/2/2024 Forms Completion telephone encounter  -Scroll down to the Media section   -Click the blue Hyperlink: FMLA Dr. Felix 2/19/24, Disab Dr. Felix 2/19/24, FMLA(Spouse) Dr. Felix 2/19/24  -Click Acknowledge located in the top right ribbon/menu   -Drag the mouse into the blank space of the document and a + sign will appear. Left click to   electronically sign the document.     Thank you,

## 2024-02-19 NOTE — TELEPHONE ENCOUNTER
Talked w/patient and told him that we received his C[employer] for [employer]. He put the name of the other company and fax number on the same page. I told him that we will need separate COCO's for each individual company.  Patient re-sent COCO and part of the fax number was cut off and was also hand written on the bottom of the COCO.

## 2024-02-19 NOTE — PROGRESS NOTES
Juan James is a 58 year old male.   Chief Complaint   Patient presents with    Post-Op     Post op left foot- Patient denies pain in office, does have intermittent pain. Numbness and tingling to left foot.         HPI:   Patient returns to the clinic 1 week 1 day postoperative does really complain of much pain or discomfort has no fevers no chills.  Dressing was clean dry and intact on presentation.  At today's visit reviewed nurse's history as taken above, allergies medications and medical history as documented below.  All changes duly noted  Allergies: Patient has no known allergies.   Current Outpatient Medications   Medication Sig Dispense Refill    amoxicillin clavulanate 875-125 MG Oral Tab Take 1 tablet by mouth 2 (two) times daily. 20 tablet 0    HYDROcodone-acetaminophen 5-325 MG Oral Tab Take 1 tablet by mouth every 4 (four) hours as needed for Pain. 30 tablet 0    amLODIPine 10 MG Oral Tab Take 1 tablet (10 mg total) by mouth daily.      Continuous Blood Gluc Sensor (XormisYLE NIKHIL 14 DAY SENSOR) Does not apply Misc USE TO CHECK FASTING BLOOD SUGAR IN THE MORNING AND 2 HOURS AFTER EACH MEAL DAILY AS DIRECTED      dapagliflozin (FARXIGA) 10 MG Oral Tab Take 15 mg by mouth daily.      Meloxicam 15 MG Oral Tab Take 1 tablet (15 mg total) by mouth daily.      valsartan 160 MG Oral Tab Take 2 tablets (320 mg total) by mouth daily.      rosuvastatin 20 MG Oral Tab Take 1 tablet (20 mg total) by mouth nightly.        Past Medical History:   Diagnosis Date    Abdominal hernia     High blood pressure     High cholesterol     Neuropathy     feet    Other and unspecified hyperlipidemia     Type II or unspecified type diabetes mellitus without mention of complication, not stated as uncontrolled     Unspecified sleep apnea     NO TREATMENT    Visual impairment       Past Surgical History:   Procedure Laterality Date    HERNIA SURGERY      OTHER SURGICAL HISTORY  06/2023    2nd toe of left foot amputated     SHOULDER SURG PROC UNLISTED        History reviewed. No pertinent family history.   Social History     Socioeconomic History    Marital status:    Tobacco Use    Smoking status: Former     Types: Cigars, Cigarettes    Smokeless tobacco: Never   Vaping Use    Vaping Use: Never used   Substance and Sexual Activity    Alcohol use: Yes     Alcohol/week: 2.0 standard drinks of alcohol     Types: 2 Cans of beer per week     Comment: social    Drug use: No           REVIEW OF SYSTEMS:   Today reviewed systens as documented below  GENERAL HEALTH: feels well otherwise  SKIN: Refer to exam below  RESPIRATORY: denies shortness of breath with exertion  CARDIOVASCULAR: denies chest pain on exertion  GI: denies abdominal pain and denies heartburn  NEURO: denies headaches    EXAM:   There were no vitals taken for this visit.  GENERAL: well developed, well nourished, in no apparent distress  EXTREMITIES:   Skin incisions at the fusion site are healing uneventfully no sign of infection noted.  X-rays were taken today AP and lateral views reviewed with the patient showing him that its mostly swelling along the medial aspect of his foot that still resembles a bunion deformity.  Aseptic dressing reapplied maintaining everything in its correct position.  Dispensed a cam boot.  ASSESSMENT AND PLAN:   Diagnoses and all orders for this visit:    Status post foot surgery    Other orders  -     EEH AMB POD XR - LT FOOT 2 VIEWS(AP, LATERAL)WT BEARING        Plan: Plan is to cam boot for his left foot.  Patient will remain strict nonweightbearing keep his foot elevated call us if there is a problem otherwise follow-up in 1 week for suture removal and reevaluation but sooner if required.  They show excellent reduction of all deformities.    The patient indicates understanding of these issues and agrees to the plan.    Porfirio Felix DPM

## 2024-02-20 NOTE — TELEPHONE ENCOUNTER
Dr. Felix,    Thank you for responding back to me.  Which form are you talking about that it was not completed?

## 2024-02-20 NOTE — TELEPHONE ENCOUNTER
Forms completed and signed. Faxed to [facility] [phone], [employer]/[place] [phone] and [facility] [address] [phone]. Confirmations received.

## 2024-02-22 ENCOUNTER — OFFICE VISIT (OUTPATIENT)
Dept: PODIATRY CLINIC | Facility: CLINIC | Age: 59
End: 2024-02-22

## 2024-02-22 DIAGNOSIS — Z98.890 STATUS POST FOOT SURGERY: Primary | ICD-10-CM

## 2024-02-22 PROCEDURE — 99024 POSTOP FOLLOW-UP VISIT: CPT | Performed by: PODIATRIST

## 2024-02-26 ENCOUNTER — PATIENT MESSAGE (OUTPATIENT)
Dept: PODIATRY CLINIC | Facility: CLINIC | Age: 59
End: 2024-02-26

## 2024-02-26 NOTE — TELEPHONE ENCOUNTER
From: Juan James  To: Porfirio Felix  Sent: 2/26/2024 8:51 AM CST  Subject: Scrapped my toe     Good morning i stubbed my toe Sunday morning minimal bleeding but was wondering if i should see dr luke ferrara or can it wait for my follow up this Thursday i treated small scrap with Neosporin and band aid thank you

## 2024-02-26 NOTE — TELEPHONE ENCOUNTER
S/w pt and he states on 2/25 he stubbed his right 5th toe on bathroom door and got a cut about 1/2cm in size. It had scant bleeding after and stopped since. He denies any redness, swelling, pain or continued bleeding. Cut appears closed and healing.  Pt has appt on 2/29 for PO of left foot. Advised pt to continue to monitor and call back for any redness, swelling, drainage, or pain. Advised pt to continue to keep cut covered and with antibiotic ointment. Will update Dr Felix and f/u if concerns, otherwise pt to keep appt on 2/29

## 2024-03-03 NOTE — PROGRESS NOTES
Juan James is a 58 year old male.   Chief Complaint   Patient presents with    Post-Op     2nd POV left foot sx 02/09/2024. No complains         HPI:   Patient returns to clinic 2 weeks status post surgery no complaints.  At today's visit reviewed nurse's history as taken above, allergies medications and medical history as documented below.  All changes duly noted  Allergies: Patient has no known allergies.   Current Outpatient Medications   Medication Sig Dispense Refill    amLODIPine 10 MG Oral Tab Take 1 tablet (10 mg total) by mouth daily.      Continuous Blood Gluc Sensor (FREESTYLE NIKHIL 14 DAY SENSOR) Does not apply Misc USE TO CHECK FASTING BLOOD SUGAR IN THE MORNING AND 2 HOURS AFTER EACH MEAL DAILY AS DIRECTED      dapagliflozin (FARXIGA) 10 MG Oral Tab Take 15 mg by mouth daily.      Meloxicam 15 MG Oral Tab Take 1 tablet (15 mg total) by mouth daily.      valsartan 160 MG Oral Tab Take 2 tablets (320 mg total) by mouth daily.      rosuvastatin 20 MG Oral Tab Take 1 tablet (20 mg total) by mouth nightly.      HYDROcodone-acetaminophen 5-325 MG Oral Tab Take 1 tablet by mouth every 4 (four) hours as needed for Pain. (Patient not taking: Reported on 2/22/2024) 30 tablet 0      Past Medical History:   Diagnosis Date    Abdominal hernia     High blood pressure     High cholesterol     Neuropathy     feet    Other and unspecified hyperlipidemia     Type II or unspecified type diabetes mellitus without mention of complication, not stated as uncontrolled     Unspecified sleep apnea     NO TREATMENT    Visual impairment       Past Surgical History:   Procedure Laterality Date    HERNIA SURGERY      OTHER SURGICAL HISTORY  06/2023    2nd toe of left foot amputated    SHOULDER SURG PROC UNLISTED        No family history on file.   Social History     Socioeconomic History    Marital status:    Tobacco Use    Smoking status: Former     Types: Cigars, Cigarettes    Smokeless tobacco: Never  OFFICE VISIT      Patient: Fracisco Johnson Date of Service: 2023   : 1989 MRN: [id number]     SUBJECTIVE:   HISTORY OF PRESENT ILLNESS:  HPI    Patient needs work clearance.  Had the flu.  Was unable to work the last 2 weeks.  Also woke up with a possible seizure.  Blood sugar was low.  Had another seizure around 6 months ago.  Has appointment with neurology in 2 weeks.  All symptoms have resolved.  PAST MEDICAL HISTORY:  Past Medical History:   Diagnosis Date   • Diabetes mellitus (CMS/[facility])        MEDICATIONS:  Current Outpatient Medications   Medication Sig   • albuterol 108 (90 Base) MCG/ACT inhaler INHALE 2 PUFFS AS DIRECTED EVERY 4 TO 6 HOURS AS NEEDED FOR WHEEZING OR FOR SHORTNESS OF BREATH   • amoxicillin-clavulanate (AUGMENTIN) 875-125 MG per tablet TAKE 1 TABLET BY MOUTH TWICE A DAY FOR 7 DAYS   • doxycycline hyclate (VIBRA-TABS) 100 MG tablet TAKE 1 TABLET BY MOUTH TWICE A DAY FOR 7 DAYS   • tadalafil (Cialis) 10 MG tablet Take 1 tablet by mouth as needed for Erectile Dysfunction.   • methylpheniDATE ER (Concerta) 27 MG CR tablet 2 tablets daily   • methylpheniDATE ER (Concerta) 27 MG CR tablet Take 2 tablets by mouth every morning.   • Continuous Blood Gluc Transmit (Dexcom G6 Transmitter) Misc 1 each daily. Change every 90 days   • insulin glargine (Lantus SoloStar) 100 UNIT/ML pen-injector Inject 22-24 Units into the skin daily. Prime 2 units before each dose.   • insulin lispro (HumaLOG KwikPen) 200 UNIT/ML pen-injector Inject 3-5 Units into the skin in the morning and 3-5 Units at noon and 3-5 Units in the evening. Inject with meals. Along with sliding scale. MDD 50. Prime 2 units before each dose.   • Methylphenidate HCl ER 27 MG TABLET SR 24 HR Take 1 tablet by mouth daily (before breakfast).   • Continuous Blood Gluc Sensor (Dexcom G6 Sensor) Misc Insert new sensor every 10 days.   • Blood Glucose Monitoring Suppl (OneTouch Verio Flex System) w/Device Kit 1 each daily.   • blood glucose    Vaping Use    Vaping Use: Never used   Substance and Sexual Activity    Alcohol use: Yes     Alcohol/week: 2.0 standard drinks of alcohol     Types: 2 Cans of beer per week     Comment: social    Drug use: No           REVIEW OF SYSTEMS:   Today reviewed systens as documented below  GENERAL HEALTH: feels well otherwise  SKIN: Refer to exam below  RESPIRATORY: denies shortness of breath with exertion  CARDIOVASCULAR: denies chest pain on exertion  GI: denies abdominal pain and denies heartburn  NEURO: denies headaches    EXAM:   There were no vitals taken for this visit.  GENERAL: well developed, well nourished, in no apparent distress  EXTREMITIES:   Dressing to his left foot was clean dry and intact pin is in place removal shows only some mild dried hemorrhagic exudate.  Sutures were left in place.  Aseptic dressing was reapplied maintaining the hallux and second toe in the correct positions.  ASSESSMENT AND PLAN:   Diagnoses and all orders for this visit:    Status post foot surgery        Plan: Patient will continue with nonweightbearing continue with physical restrictions keep his foot elevated keep his dressing clean and dry follow-up with us in 1 week for suture removal x-ray and reassessment.    The patient indicates understanding of these issues and agrees to the plan.    Porfirio Felix DPM   test strip Test blood sugar *4 times daily. Diagnosis: e10.9. Meter: One Touch verio   • OneTouch Delica Lancets 33G Misc Inject 1 each into the skin 4 times daily.   • sildenafil (Viagra) 50 MG tablet Take 1 tablet 1 hour prior to sexual intercourse.  No more than 1 tablet per 24-hour.   • Glucagon (Baqsimi Two Pack) 3 MG/DOSE Powder For emergency treatment of  hypoglycemia/low sugar.Call 911.  Place 1 spray in 1 nostril.  If no response in 15 minutes, place 1 more spray in nostril with new device.   • pantoprazole (PROTONIX) 40 MG tablet Take 40 mg by mouth every morning.     No current facility-administered medications for this visit.       ALLERGIES:  ALLERGIES:  No Known Allergies    PAST SURGICAL HISTORY:  History reviewed. No pertinent surgical history.    FAMILY HISTORY:  History reviewed. No pertinent family history.    SOCIAL HISTORY:  Social History     Tobacco Use   • Smoking status: Never   • Smokeless tobacco: Never   Vaping Use   • Vaping Use: never used   Substance Use Topics   • Alcohol use: Yes   • Drug use: Yes     Types: Marijuana, Cannabinols       Review of Systems   Constitutional: Negative.    HENT: Negative.    Eyes: Negative.    Respiratory: Negative.    Cardiovascular: Negative.    Gastrointestinal: Negative.    Endocrine: Negative.    Genitourinary: Negative.    Musculoskeletal: Negative.    Skin: Negative.    Allergic/Immunologic: Negative.    Neurological: Negative.    Hematological: Negative.    Psychiatric/Behavioral: Negative.          OBJECTIVE:     Physical Exam    Visit Vitals  /66   Pulse 93   Temp 97.4 °F (36.3 °C) (Oral)   Resp 18   Ht 6' (1.829 m)   Wt 81.6 kg (179 lb 14.3 oz)   SpO2 98%   BMI 24.40 kg/m²         Wt Readings from Last 1 Encounters:   01/17/23 81.6 kg (179 lb 14.3 oz)        Alert, no distress.  Vital signs reviewed.    Lungs clear  CV-negative murmur, carotids negative bruits, extremities negative edema  DIAGNOSTIC STUDIES:   LAB  RESULTS:            Assessment AND PLAN:   Assessment   Problem List Items Addressed This Visit        Endocrine and Metabolic    Type 1 diabetes mellitus with hyperglycemia (CMS/HCC) - Primary    Relevant Orders    GLYCOHEMOGLOBIN    MICROALBUMIN URINE RANDOM       No problem-specific Assessment & Plan notes found for this encounter.        Patient Instructions   Impression and plan-    Viral syndrome resolved.  Seizure possibly from hypoglycemia or fever.  Follow-up with neurology.  Okay to return to work on 1/23/2023.  Work note given.  Call if problems.      Return if symptoms worsen or fail to improve.    Instructions provided as documented in the AVS.      The patient indicated understanding of the diagnosis and agreed with the plan of care.      Manish Ludwig MD  1/17/2023

## 2024-03-04 ENCOUNTER — OFFICE VISIT (OUTPATIENT)
Dept: PODIATRY CLINIC | Facility: CLINIC | Age: 59
End: 2024-03-04
Payer: COMMERCIAL

## 2024-03-04 DIAGNOSIS — Z98.890 STATUS POST FOOT SURGERY: Primary | ICD-10-CM

## 2024-03-04 PROCEDURE — 99024 POSTOP FOLLOW-UP VISIT: CPT | Performed by: PODIATRIST

## 2024-03-04 NOTE — PROGRESS NOTES
Juan James is a 58 year old male.   Chief Complaint   Patient presents with    Post-Op     3rd sx on 02/09- left foot- patient denies pain- tingling sensation on the left great toe          HPI:   Patient is 3 and half weeks status post surgery.  Presents dressing is clean dry and intact.  At today's visit reviewed nurse's history as taken above, allergies medications and medical history as documented below.  All changes duly noted  Allergies: Patient has no known allergies.   Current Outpatient Medications   Medication Sig Dispense Refill    amLODIPine 10 MG Oral Tab Take 1 tablet (10 mg total) by mouth daily.      Continuous Blood Gluc Sensor (HomeSphereSTYLE NIKHIL 14 DAY SENSOR) Does not apply Misc USE TO CHECK FASTING BLOOD SUGAR IN THE MORNING AND 2 HOURS AFTER EACH MEAL DAILY AS DIRECTED      dapagliflozin (FARXIGA) 10 MG Oral Tab Take 15 mg by mouth daily.      Meloxicam 15 MG Oral Tab Take 1 tablet (15 mg total) by mouth daily.      valsartan 160 MG Oral Tab Take 2 tablets (320 mg total) by mouth daily.      rosuvastatin 20 MG Oral Tab Take 1 tablet (20 mg total) by mouth nightly.      HYDROcodone-acetaminophen 5-325 MG Oral Tab Take 1 tablet by mouth every 4 (four) hours as needed for Pain. (Patient not taking: Reported on 2/22/2024) 30 tablet 0      Past Medical History:   Diagnosis Date    Abdominal hernia     High blood pressure     High cholesterol     Neuropathy     feet    Other and unspecified hyperlipidemia     Type II or unspecified type diabetes mellitus without mention of complication, not stated as uncontrolled     Unspecified sleep apnea     NO TREATMENT    Visual impairment       Past Surgical History:   Procedure Laterality Date    HERNIA SURGERY      OTHER SURGICAL HISTORY  06/2023    2nd toe of left foot amputated    SHOULDER SURG PROC UNLISTED        History reviewed. No pertinent family history.   Social History     Socioeconomic History    Marital status:    Tobacco Use     Smoking status: Former     Types: Cigars, Cigarettes    Smokeless tobacco: Never   Vaping Use    Vaping Use: Never used   Substance and Sexual Activity    Alcohol use: Yes     Alcohol/week: 2.0 standard drinks of alcohol     Types: 2 Cans of beer per week     Comment: social    Drug use: No           REVIEW OF SYSTEMS:   Today reviewed systens as documented below  GENERAL HEALTH: feels well otherwise  SKIN: Refer to exam below  RESPIRATORY: denies shortness of breath with exertion  CARDIOVASCULAR: denies chest pain on exertion  GI: denies abdominal pain and denies heartburn  NEURO: denies headaches    EXAM:   There were no vitals taken for this visit.  GENERAL: well developed, well nourished, in no apparent distress  EXTREMITIES:   1 skin incisions are well-healed there is no sign of an infection noted.  Fusion sites appear to be very stable and the second toe as well as the first MTP joint K wire was removed  X-rays were taken 3 views show the fusion sites to be good slight gap on the second toe PIP joint fusion but we will just have to see how that heals.  ASSESSMENT AND PLAN:   Diagnoses and all orders for this visit:    Status post foot surgery    Other orders  -     [facility] PODIATRY XR - LEFT FOOT 2 VIEWS (AP,LATERAL)        Plan: Resume normal bathing dispensed a nylon anklet for the patient to wear to control swelling he will continue to wear the boot continue to be nonweightbearing and follow-up with us again in 2 weeks.  Call if there is any problems.    The patient indicates understanding of these issues and agrees to the plan.    Porfirio Felix DPM

## 2024-03-18 ENCOUNTER — OFFICE VISIT (OUTPATIENT)
Dept: PODIATRY CLINIC | Facility: CLINIC | Age: 59
End: 2024-03-18
Payer: COMMERCIAL

## 2024-03-18 DIAGNOSIS — Z98.890 STATUS POST FOOT SURGERY: Primary | ICD-10-CM

## 2024-03-18 PROCEDURE — 99024 POSTOP FOLLOW-UP VISIT: CPT | Performed by: PODIATRIST

## 2024-03-21 NOTE — PROGRESS NOTES
Juan James is a 58 year old male.   Chief Complaint   Patient presents with    Post-Op     Sx on 02/09- left foot- patient denies pain          HPI:   Patient presents to the clinic now 6 weeks status post surgery on his left foot not having any pain.  At today's visit reviewed nurse's history as taken above, allergies medications and medical history as documented below.  All changes duly noted  Allergies: Patient has no known allergies.   Current Outpatient Medications   Medication Sig Dispense Refill    amLODIPine 10 MG Oral Tab Take 1 tablet (10 mg total) by mouth daily.      Continuous Blood Gluc Sensor (FREESTYLE NIKHIL 14 DAY SENSOR) Does not apply Misc USE TO CHECK FASTING BLOOD SUGAR IN THE MORNING AND 2 HOURS AFTER EACH MEAL DAILY AS DIRECTED      dapagliflozin (FARXIGA) 10 MG Oral Tab Take 15 mg by mouth daily.      Meloxicam 15 MG Oral Tab Take 1 tablet (15 mg total) by mouth daily.      valsartan 160 MG Oral Tab Take 2 tablets (320 mg total) by mouth daily.      rosuvastatin 20 MG Oral Tab Take 1 tablet (20 mg total) by mouth nightly.      HYDROcodone-acetaminophen 5-325 MG Oral Tab Take 1 tablet by mouth every 4 (four) hours as needed for Pain. (Patient not taking: Reported on 2/22/2024) 30 tablet 0      Past Medical History:   Diagnosis Date    Abdominal hernia     High blood pressure     High cholesterol     Neuropathy     feet    Other and unspecified hyperlipidemia     Type II or unspecified type diabetes mellitus without mention of complication, not stated as uncontrolled     Unspecified sleep apnea     NO TREATMENT    Visual impairment       Past Surgical History:   Procedure Laterality Date    HERNIA SURGERY      OTHER SURGICAL HISTORY  06/2023    2nd toe of left foot amputated    SHOULDER SURG PROC UNLISTED        History reviewed. No pertinent family history.   Social History     Socioeconomic History    Marital status:    Tobacco Use    Smoking status: Former     Types:  Cigars, Cigarettes    Smokeless tobacco: Never   Vaping Use    Vaping Use: Never used   Substance and Sexual Activity    Alcohol use: Yes     Alcohol/week: 2.0 standard drinks of alcohol     Types: 2 Cans of beer per week     Comment: social    Drug use: No           REVIEW OF SYSTEMS:   Today reviewed systens as documented below  GENERAL HEALTH: feels well otherwise  SKIN: Refer to exam below  RESPIRATORY: denies shortness of breath with exertion  CARDIOVASCULAR: denies chest pain on exertion  GI: denies abdominal pain and denies heartburn  NEURO: denies headaches    EXAM:   There were no vitals taken for this visit.  GENERAL: well developed, well nourished, in no apparent distress  EXTREMITIES:   The skin incision of the fusion site as well as the second toe is well-healed.  Second toe has mild flexion but not as bad as it was.  Does have some transverse plane deviation which is expected.  X-rays were exposed 3 views on the left foot showing the fusion site still has a line across it.  But appears to be healing there is no displacement noted the plates intact.  Screws are all intact and not retrograding.  Second toe looks like it may still fused.  ASSESSMENT AND PLAN:   Diagnoses and all orders for this visit:    Status post foot surgery    Other orders  -     [facility] AMB POD XR - LT FOOT 3 VIEWS(AP,LAT,OBLIQUE) WT BEARING        Plan: At today's office visit evaluated the patient the begin weightbearing lightly on the foot if he notices any swelling we will get back off of that I will see him in 2 to 3 weeks see how is progressing.  Call if there is problems.  I do not recommend he return to work at [employer] just yet.    The patient indicates understanding of these issues and agrees to the plan.    Porfirio Felix DPM

## 2024-04-01 ENCOUNTER — OFFICE VISIT (OUTPATIENT)
Dept: PODIATRY CLINIC | Facility: CLINIC | Age: 59
End: 2024-04-01
Payer: COMMERCIAL

## 2024-04-01 ENCOUNTER — LAB ENCOUNTER (OUTPATIENT)
Dept: LAB | Age: 59
End: 2024-04-01
Attending: FAMILY MEDICINE
Payer: COMMERCIAL

## 2024-04-01 DIAGNOSIS — E11.69 TYPE 2 DIABETES MELLITUS WITH OTHER SPECIFIED COMPLICATION, WITHOUT LONG-TERM CURRENT USE OF INSULIN (HCC): ICD-10-CM

## 2024-04-01 DIAGNOSIS — Z98.890 STATUS POST FOOT SURGERY: Primary | ICD-10-CM

## 2024-04-01 LAB
CREAT UR-SCNC: 78.1 MG/DL
MICROALBUMIN UR-MCNC: 168.4 MG/DL
MICROALBUMIN/CREAT 24H UR-RTO: 2156.2 UG/MG (ref ?–30)
VIT B12 SERPL-MCNC: 858 PG/ML (ref 211–911)
VIT D+METAB SERPL-MCNC: 22.6 NG/ML (ref 30–100)

## 2024-04-01 PROCEDURE — 82607 VITAMIN B-12: CPT

## 2024-04-01 PROCEDURE — 82570 ASSAY OF URINE CREATININE: CPT

## 2024-04-01 PROCEDURE — 36415 COLL VENOUS BLD VENIPUNCTURE: CPT

## 2024-04-01 PROCEDURE — 82306 VITAMIN D 25 HYDROXY: CPT

## 2024-04-01 PROCEDURE — 82043 UR ALBUMIN QUANTITATIVE: CPT

## 2024-04-01 PROCEDURE — 99024 POSTOP FOLLOW-UP VISIT: CPT | Performed by: PODIATRIST

## 2024-04-08 ENCOUNTER — TELEPHONE (OUTPATIENT)
Facility: CLINIC | Age: 59
End: 2024-04-08

## 2024-04-08 NOTE — PROGRESS NOTES
If the multivitamin does not have any vitamin d, would recommend starting a separate vitamin D supplement of 2000 units daily and we can repeat labs prior in 3 months.

## 2024-04-08 NOTE — TELEPHONE ENCOUNTER
Dr isabel reviewed patients labwork.   Per dr isabel\"if the multivitamin does not have any vitamin d, would recommend starting a separate vitamin D supplement of 2000 units daily and we can repeat labs prior in 3 months.\"  Called patient and informed him of the above. Pt verbalized understanding.  Informed patient that at his 05/020/24 OV dr isabel will order repeat labs.     All questions answered. Closing encounter.    20.8

## 2024-04-09 ENCOUNTER — TELEPHONE (OUTPATIENT)
Facility: CLINIC | Age: 59
End: 2024-04-09

## 2024-04-09 DIAGNOSIS — R79.89 LOW SERUM VITAMIN D: Primary | ICD-10-CM

## 2024-04-09 NOTE — TELEPHONE ENCOUNTER
Patient currently scheduled for f/u appointment on 5/2/24.     Pending repeat Vitamin D level. (See result notes dated 4/1/24).

## 2024-04-10 NOTE — TELEPHONE ENCOUNTER
Spoke with patient on telephone. Re-scheduled f/u appt to 6/4/24.   Reviewed to have Vitamin D level drawn prior to appt. States will do.    Closing this encounter.

## 2024-04-11 NOTE — PROGRESS NOTES
Juan James is a 58 year old male.   Chief Complaint   Patient presents with    Post-Op     Left foot- sx on 02/09- patient denies pain          HPI:   Patient returns the clinic 2 months postoperative doing well no complaints of pain fever chills his wife accompanies him.  At today's visit reviewed nurse's history as taken above, allergies medications and medical history as documented below.  All changes duly noted  Allergies: Patient has no known allergies.   Current Outpatient Medications   Medication Sig Dispense Refill    amLODIPine 10 MG Oral Tab Take 1 tablet (10 mg total) by mouth daily.      Continuous Blood Gluc Sensor (FREESTYLE NIKHIL 14 DAY SENSOR) Does not apply Misc USE TO CHECK FASTING BLOOD SUGAR IN THE MORNING AND 2 HOURS AFTER EACH MEAL DAILY AS DIRECTED      dapagliflozin (FARXIGA) 10 MG Oral Tab Take 15 mg by mouth daily.      Meloxicam 15 MG Oral Tab Take 1 tablet (15 mg total) by mouth daily.      valsartan 160 MG Oral Tab Take 2 tablets (320 mg total) by mouth daily.      rosuvastatin 20 MG Oral Tab Take 1 tablet (20 mg total) by mouth nightly.      HYDROcodone-acetaminophen 5-325 MG Oral Tab Take 1 tablet by mouth every 4 (four) hours as needed for Pain. (Patient not taking: Reported on 2/22/2024) 30 tablet 0      Past Medical History:    Abdominal hernia    High blood pressure    High cholesterol    Neuropathy    feet    Other and unspecified hyperlipidemia    Type II or unspecified type diabetes mellitus without mention of complication, not stated as uncontrolled    Unspecified sleep apnea    NO TREATMENT    Visual impairment      Past Surgical History:   Procedure Laterality Date    Hernia surgery      Other surgical history  06/2023    2nd toe of left foot amputated    Shoulder surg proc unlisted        History reviewed. No pertinent family history.   Social History     Socioeconomic History    Marital status:    Tobacco Use    Smoking status: Former     Types: Cigars,  Cigarettes    Smokeless tobacco: Never   Vaping Use    Vaping status: Never Used   Substance and Sexual Activity    Alcohol use: Yes     Alcohol/week: 2.0 standard drinks of alcohol     Types: 2 Cans of beer per week     Comment: social    Drug use: No           REVIEW OF SYSTEMS:   Today reviewed systens as documented below  GENERAL HEALTH: feels well otherwise  SKIN: Refer to exam below  RESPIRATORY: denies shortness of breath with exertion  CARDIOVASCULAR: denies chest pain on exertion  GI: denies abdominal pain and denies heartburn  NEURO: denies headaches    EXAM:   There were no vitals taken for this visit.  GENERAL: well developed, well nourished, in no apparent distress  EXTREMITIES:   1. Integument: The skin on the patient's foot is warm and dry.  All incision lines are healed on the left foot.   2. Vascular: The patient still has palpable pulses still has mild swelling   3. Neurologic: Patient has diminished pain sensation   4. Musculoskeletal: Patient has good muscle strength.  I palpated the fusion site there is no motion noted.  This third toe has a mild medial deviation which was checked on x-ray and reviewed with the patient.  There is a slight dislocation beginning on the third MTP joint but it is not totally dislocated we will continue to monitor.    ASSESSMENT AND PLAN:   Diagnoses and all orders for this visit:    Status post foot surgery  -     [facility] AMB POD XR - LT FOOT 2 VIEWS(AP, LATERAL)WT BEARING        Plan: Will continue to monitor the third MTP joint.  Continue to monitor with x-rays follow-up in a few weeks the patient can bear weight on the foot as long as he is in the boot but if he thinks it is swelling or he feels there is a problem he should go back to nonweightbearing and return to clinic sooner than appointed.    The patient indicates understanding of these issues and agrees to the plan.    Porfirio Felix DPM

## 2024-04-11 NOTE — PROGRESS NOTES
Juan James is a 58 year old male.   Chief Complaint   Patient presents with    Post-Op     Left foot- sx on 02/09- patient denies pain          HPI:   *** At today's visit reviewed nurse's history as taken above, allergies medications and medical history as documented below.  All changes duly noted  Allergies: Patient has no known allergies.   Current Outpatient Medications   Medication Sig Dispense Refill    amLODIPine 10 MG Oral Tab Take 1 tablet (10 mg total) by mouth daily.      Continuous Blood Gluc Sensor (FREESTYLE NIKHIL 14 DAY SENSOR) Does not apply Misc USE TO CHECK FASTING BLOOD SUGAR IN THE MORNING AND 2 HOURS AFTER EACH MEAL DAILY AS DIRECTED      dapagliflozin (FARXIGA) 10 MG Oral Tab Take 15 mg by mouth daily.      Meloxicam 15 MG Oral Tab Take 1 tablet (15 mg total) by mouth daily.      valsartan 160 MG Oral Tab Take 2 tablets (320 mg total) by mouth daily.      rosuvastatin 20 MG Oral Tab Take 1 tablet (20 mg total) by mouth nightly.      HYDROcodone-acetaminophen 5-325 MG Oral Tab Take 1 tablet by mouth every 4 (four) hours as needed for Pain. (Patient not taking: Reported on 2/22/2024) 30 tablet 0      Past Medical History:    Abdominal hernia    High blood pressure    High cholesterol    Neuropathy    feet    Other and unspecified hyperlipidemia    Type II or unspecified type diabetes mellitus without mention of complication, not stated as uncontrolled    Unspecified sleep apnea    NO TREATMENT    Visual impairment      Past Surgical History:   Procedure Laterality Date    Hernia surgery      Other surgical history  06/2023    2nd toe of left foot amputated    Shoulder surg proc unlisted        History reviewed. No pertinent family history.   Social History     Socioeconomic History    Marital status:    Tobacco Use    Smoking status: Former     Types: Cigars, Cigarettes    Smokeless tobacco: Never   Vaping Use    Vaping status: Never Used   Substance and Sexual Activity     Alcohol use: Yes     Alcohol/week: 2.0 standard drinks of alcohol     Types: 2 Cans of beer per week     Comment: social    Drug use: No           REVIEW OF SYSTEMS:   Today reviewed systens as documented below  GENERAL HEALTH: feels well otherwise  SKIN: Refer to exam below  RESPIRATORY: denies shortness of breath with exertion  CARDIOVASCULAR: denies chest pain on exertion  GI: denies abdominal pain and denies heartburn  NEURO: denies headaches    EXAM:   There were no vitals taken for this visit.  GENERAL: well developed, well nourished, in no apparent distress  EXTREMITIES:   1. Integument:  ***   2. Vascular:  ***   3. Neurologic:  ***   4. Musculoskeletal:  ***    ASSESSMENT AND PLAN:   Diagnoses and all orders for this visit:    Status post foot surgery  -     [facility] AMB POD XR - LT FOOT 2 VIEWS(AP, LATERAL)WT BEARING        Plan: ***    The patient indicates understanding of these issues and agrees to the plan.    LISANDRO GarciaMJeaislinn Devin James is a 58 year old male.   Chief Complaint   Patient presents with    Post-Op     Left foot- sx on 02/09- patient denies pain          HPI:   Patient is now 2 months status post surgery overall doing well.  At today's visit reviewed nurse's history as taken above, allergies medications and medical history as documented below.  All changes duly noted  Allergies: Patient has no known allergies.   Current Outpatient Medications   Medication Sig Dispense Refill    amLODIPine 10 MG Oral Tab Take 1 tablet (10 mg total) by mouth daily.      Continuous Blood Gluc Sensor (FREESTYLE NIKHIL 14 DAY SENSOR) Does not apply Misc USE TO CHECK FASTING BLOOD SUGAR IN THE MORNING AND 2 HOURS AFTER EACH MEAL DAILY AS DIRECTED      dapagliflozin (FARXIGA) 10 MG Oral Tab Take 15 mg by mouth daily.      Meloxicam 15 MG Oral Tab Take 1 tablet (15 mg total) by mouth daily.      valsartan 160 MG Oral Tab Take 2 tablets (320 mg total) by mouth daily.      rosuvastatin 20 MG Oral Tab  Take 1 tablet (20 mg total) by mouth nightly.      HYDROcodone-acetaminophen 5-325 MG Oral Tab Take 1 tablet by mouth every 4 (four) hours as needed for Pain. (Patient not taking: Reported on 2/22/2024) 30 tablet 0      Past Medical History:    Abdominal hernia    High blood pressure    High cholesterol    Neuropathy    feet    Other and unspecified hyperlipidemia    Type II or unspecified type diabetes mellitus without mention of complication, not stated as uncontrolled    Unspecified sleep apnea    NO TREATMENT    Visual impairment      Past Surgical History:   Procedure Laterality Date    Hernia surgery      Other surgical history  06/2023    2nd toe of left foot amputated    Shoulder surg proc unlisted        History reviewed. No pertinent family history.   Social History     Socioeconomic History    Marital status:    Tobacco Use    Smoking status: Former     Types: Cigars, Cigarettes    Smokeless tobacco: Never   Vaping Use    Vaping status: Never Used   Substance and Sexual Activity    Alcohol use: Yes     Alcohol/week: 2.0 standard drinks of alcohol     Types: 2 Cans of beer per week     Comment: social    Drug use: No           REVIEW OF SYSTEMS:   Today reviewed systens as documented below  GENERAL HEALTH: feels well otherwise  SKIN: Refer to exam below  RESPIRATORY: denies shortness of breath with exertion  CARDIOVASCULAR: denies chest pain on exertion  GI: denies abdominal pain and denies heartburn  NEURO: denies headaches    EXAM:   There were no vitals taken for this visit.  GENERAL: well developed, well nourished, in no apparent distress  EXTREMITIES:   1. Integument: The skin incisions are well-healed on his left foot there is still little bit of swelling around the first MTP joint and second toe area   2. Vascular: Patient has palpable pulses   3. Neurologic: Patient has intact sensorium no motion was palpated at the fusion site the second toe is also fusing but his left medially  deviated.   4. Musculoskeletal:  X-rays were taken and reviewed with the patient they do show some slight dislocation of the third MTP joint but I do not think it is severe.  For right now we will just see how everything heals.    ASSESSMENT AND PLAN:   Diagnoses and all orders for this visit:    Status post foot surgery  -     [facility] AMB POD XR - LT FOOT 2 VIEWS(AP, LATERAL)WT BEARING        Plan: The patient can ambulate in the boot but if he notices any problems or go back to nonweightbearing recommended he follow-up in another 2 or 3 weeks just for checkup make sure everything is continuing to heal.    The patient indicates understanding of these issues and agrees to the plan.    Porfirio Felix DPM

## 2024-04-18 ENCOUNTER — TELEPHONE (OUTPATIENT)
Dept: PODIATRY CLINIC | Facility: CLINIC | Age: 59
End: 2024-04-18

## 2024-04-18 NOTE — TELEPHONE ENCOUNTER
Patient is requesting to get a return to work note with a potential return date, to return to work at [employer]. Please fax note to [employer] - Attention: [place] - fax # [phone]. Patient would like to speak to the nurse to confirm that the fax was sent.

## 2024-04-19 NOTE — TELEPHONE ENCOUNTER
Spoke with patient. His work needs a doctor's note with a basic update with next visit. I did not see any notes with regards to him returning to work since the 3/18/24 visit, which indicated he was not ready to return. I created a note with next visit date stating that patient will be assessed on that date and an update will be provided at that time. Note faxed to requested fax number confirmed by patient.

## 2024-05-02 ENCOUNTER — OFFICE VISIT (OUTPATIENT)
Dept: PODIATRY CLINIC | Facility: CLINIC | Age: 59
End: 2024-05-02
Payer: COMMERCIAL

## 2024-05-02 DIAGNOSIS — G89.18 ACUTE POSTOPERATIVE PAIN OF LEFT FOOT: ICD-10-CM

## 2024-05-02 DIAGNOSIS — Z98.890 STATUS POST FOOT SURGERY: ICD-10-CM

## 2024-05-02 DIAGNOSIS — M79.672 ACUTE POSTOPERATIVE PAIN OF LEFT FOOT: ICD-10-CM

## 2024-05-02 DIAGNOSIS — M96.89 DELAYED UNION AFTER OSTEOTOMY: Primary | ICD-10-CM

## 2024-05-02 PROCEDURE — 99024 POSTOP FOLLOW-UP VISIT: CPT | Performed by: PODIATRIST

## 2024-05-06 ENCOUNTER — TELEPHONE (OUTPATIENT)
Dept: PODIATRY CLINIC | Facility: CLINIC | Age: 59
End: 2024-05-06

## 2024-05-06 NOTE — TELEPHONE ENCOUNTER
Received fax from [facility] for medical information. Sent via email and TE and also sending interoffice envelope.

## 2024-05-08 NOTE — PROGRESS NOTES
Juan James is a 58 year old male.   Chief Complaint   Patient presents with    Post-Op     Left foot- patient denies pain         HPI:   Patient returns to clinic now 3 months status post surgery is not having any pain.  At today's visit reviewed nurse's history as taken above, allergies medications and medical history as documented below.  All changes duly noted  Allergies: Patient has no known allergies.   Current Outpatient Medications   Medication Sig Dispense Refill    amLODIPine 10 MG Oral Tab Take 1 tablet (10 mg total) by mouth daily.      Continuous Blood Gluc Sensor (FREESTYLE NIKHIL 14 DAY SENSOR) Does not apply Misc USE TO CHECK FASTING BLOOD SUGAR IN THE MORNING AND 2 HOURS AFTER EACH MEAL DAILY AS DIRECTED      dapagliflozin (FARXIGA) 10 MG Oral Tab Take 15 mg by mouth daily.      Meloxicam 15 MG Oral Tab Take 1 tablet (15 mg total) by mouth daily.      valsartan 160 MG Oral Tab Take 2 tablets (320 mg total) by mouth daily.      rosuvastatin 20 MG Oral Tab Take 1 tablet (20 mg total) by mouth nightly.      HYDROcodone-acetaminophen 5-325 MG Oral Tab Take 1 tablet by mouth every 4 (four) hours as needed for Pain. (Patient not taking: Reported on 2/22/2024) 30 tablet 0      Past Medical History:    Abdominal hernia    High blood pressure    High cholesterol    Neuropathy    feet    Other and unspecified hyperlipidemia    Type II or unspecified type diabetes mellitus without mention of complication, not stated as uncontrolled    Unspecified sleep apnea    NO TREATMENT    Visual impairment      Past Surgical History:   Procedure Laterality Date    Hernia surgery      Other surgical history  06/2023    2nd toe of left foot amputated    Shoulder surg proc unlisted        History reviewed. No pertinent family history.   Social History     Socioeconomic History    Marital status:    Tobacco Use    Smoking status: Former     Types: Cigars, Cigarettes    Smokeless tobacco: Never   Vaping Use     Vaping status: Never Used   Substance and Sexual Activity    Alcohol use: Yes     Alcohol/week: 2.0 standard drinks of alcohol     Types: 2 Cans of beer per week     Comment: social    Drug use: No           REVIEW OF SYSTEMS:   Today reviewed systens as documented below  GENERAL HEALTH: feels well otherwise  SKIN: Refer to exam below  RESPIRATORY: denies shortness of breath with exertion  CARDIOVASCULAR: denies chest pain on exertion  GI: denies abdominal pain and denies heartburn  NEURO: denies headaches    EXAM:   There were no vitals taken for this visit.  GENERAL: well developed, well nourished, in no apparent distress  EXTREMITIES:   Are well-healed.  Third toe deviating no motion palpated at the fusion site  X-rays taken 3 views still show that the fusion site is not as united as I would like.  Will get a CT scan for that.  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  Still the patient cannot return to work.    The patient indicates understanding of these issues and agrees to the plan.    Porfirio Felix, MARGARET

## 2024-05-08 NOTE — TELEPHONE ENCOUNTER
Protected health information request received via email, valid auth attached, logged for processing

## 2024-05-09 NOTE — TELEPHONE ENCOUNTER
Patient spouse( on verbal) called forms dept. Patient spouse states they have received a trip cancellation form they need completed. Patient spouse would like to submit to our dept. Patient spouse wishes to email. Provided patient's spouse with our email [email or web address].

## 2024-05-09 NOTE — TELEPHONE ENCOUNTER
Protected health information request has been faxed to [facility] - fax #: [phone], and now, awaiting a fax confirmation.

## 2024-05-13 NOTE — TELEPHONE ENCOUNTER
Protected health information request completed and faxed to [employer] @ [phone], confirmation received.  MyChart sent to patient.

## 2024-05-17 ENCOUNTER — HOSPITAL ENCOUNTER (OUTPATIENT)
Dept: CT IMAGING | Age: 59
Discharge: HOME | End: 2024-05-17
Attending: PODIATRIST

## 2024-05-17 DIAGNOSIS — M96.89 DELAYED UNION AFTER OSTEOTOMY: ICD-10-CM

## 2024-05-17 PROCEDURE — 76377 3D RENDER W/INTRP POSTPROCES: CPT | Performed by: PODIATRIST

## 2024-05-17 PROCEDURE — 73700 CT LOWER EXTREMITY W/O DYE: CPT | Performed by: PODIATRIST

## 2024-05-24 ENCOUNTER — LAB ENCOUNTER (OUTPATIENT)
Dept: LAB | Age: 59
End: 2024-05-24
Attending: STUDENT IN AN ORGANIZED HEALTH CARE EDUCATION/TRAINING PROGRAM

## 2024-05-24 DIAGNOSIS — R79.89 LOW SERUM VITAMIN D: ICD-10-CM

## 2024-05-24 LAB — VIT D+METAB SERPL-MCNC: 25.3 NG/ML (ref 30–100)

## 2024-05-24 PROCEDURE — 82306 VITAMIN D 25 HYDROXY: CPT

## 2024-06-04 ENCOUNTER — OFFICE VISIT (OUTPATIENT)
Dept: PODIATRY CLINIC | Facility: CLINIC | Age: 59
End: 2024-06-04
Payer: COMMERCIAL

## 2024-06-04 ENCOUNTER — OFFICE VISIT (OUTPATIENT)
Facility: CLINIC | Age: 59
End: 2024-06-04
Payer: COMMERCIAL

## 2024-06-04 VITALS
BODY MASS INDEX: 31.15 KG/M2 | OXYGEN SATURATION: 96 % | WEIGHT: 230 LBS | HEART RATE: 74 BPM | SYSTOLIC BLOOD PRESSURE: 140 MMHG | DIASTOLIC BLOOD PRESSURE: 70 MMHG | HEIGHT: 72 IN

## 2024-06-04 DIAGNOSIS — E11.69 TYPE 2 DIABETES MELLITUS WITH OTHER SPECIFIED COMPLICATION, WITHOUT LONG-TERM CURRENT USE OF INSULIN (HCC): Primary | ICD-10-CM

## 2024-06-04 DIAGNOSIS — Z98.890 STATUS POST FOOT SURGERY: Primary | ICD-10-CM

## 2024-06-04 LAB — HEMOGLOBIN A1C: 7 % (ref 4.3–5.6)

## 2024-06-04 PROCEDURE — 83036 HEMOGLOBIN GLYCOSYLATED A1C: CPT | Performed by: STUDENT IN AN ORGANIZED HEALTH CARE EDUCATION/TRAINING PROGRAM

## 2024-06-04 PROCEDURE — 3077F SYST BP >= 140 MM HG: CPT | Performed by: STUDENT IN AN ORGANIZED HEALTH CARE EDUCATION/TRAINING PROGRAM

## 2024-06-04 PROCEDURE — 3060F POS MICROALBUMINURIA REV: CPT | Performed by: STUDENT IN AN ORGANIZED HEALTH CARE EDUCATION/TRAINING PROGRAM

## 2024-06-04 PROCEDURE — 99214 OFFICE O/P EST MOD 30 MIN: CPT | Performed by: STUDENT IN AN ORGANIZED HEALTH CARE EDUCATION/TRAINING PROGRAM

## 2024-06-04 PROCEDURE — 3008F BODY MASS INDEX DOCD: CPT | Performed by: STUDENT IN AN ORGANIZED HEALTH CARE EDUCATION/TRAINING PROGRAM

## 2024-06-04 PROCEDURE — 3051F HG A1C>EQUAL 7.0%<8.0%: CPT | Performed by: STUDENT IN AN ORGANIZED HEALTH CARE EDUCATION/TRAINING PROGRAM

## 2024-06-04 PROCEDURE — 99213 OFFICE O/P EST LOW 20 MIN: CPT | Performed by: PODIATRIST

## 2024-06-04 PROCEDURE — 3078F DIAST BP <80 MM HG: CPT | Performed by: STUDENT IN AN ORGANIZED HEALTH CARE EDUCATION/TRAINING PROGRAM

## 2024-06-04 NOTE — PROGRESS NOTES
Juan James is a 58 year old male.   Chief Complaint   Patient presents with    Post-Op     Post op left foot- denies pain         HPI:   Returns to clinic now approximately 3 and half months s/p surgery doing well.  CT scan shows that there is healing occurring at the dorsal medial aspect of the fusion site.  At today's visit reviewed nurse's history as taken above, allergies medications and medical history as documented below.  All changes duly noted  Allergies: Patient has no known allergies.   Current Outpatient Medications   Medication Sig Dispense Refill    amLODIPine 10 MG Oral Tab Take 1 tablet (10 mg total) by mouth daily.      Continuous Blood Gluc Sensor ([employer] NIKHIL 14 DAY SENSOR) Does not apply Misc USE TO CHECK FASTING BLOOD SUGAR IN THE MORNING AND 2 HOURS AFTER EACH MEAL DAILY AS DIRECTED      dapagliflozin (FARXIGA) 10 MG Oral Tab Take 15 mg by mouth daily.      Meloxicam 15 MG Oral Tab Take 1 tablet (15 mg total) by mouth daily.      valsartan 160 MG Oral Tab Take 2 tablets (320 mg total) by mouth daily.      rosuvastatin 20 MG Oral Tab Take 1 tablet (20 mg total) by mouth nightly.      HYDROcodone-acetaminophen 5-325 MG Oral Tab Take 1 tablet by mouth every 4 (four) hours as needed for Pain. (Patient not taking: Reported on 2/22/2024) 30 tablet 0      Past Medical History:    Abdominal hernia    High blood pressure    High cholesterol    Neuropathy    feet    Other and unspecified hyperlipidemia    Type II or unspecified type diabetes mellitus without mention of complication, not stated as uncontrolled    Unspecified sleep apnea    NO TREATMENT    Visual impairment      Past Surgical History:   Procedure Laterality Date    Hernia surgery      Other surgical history  06/2023    2nd toe of left foot amputated    Shoulder surg proc unlisted        History reviewed. No pertinent family history.   Social History     Socioeconomic History    Marital status:    Tobacco Use     Smoking status: Former     Types: Cigars, Cigarettes    Smokeless tobacco: Never   Vaping Use    Vaping status: Never Used   Substance and Sexual Activity    Alcohol use: Yes     Alcohol/week: 2.0 standard drinks of alcohol     Types: 2 Cans of beer per week     Comment: social    Drug use: No           REVIEW OF SYSTEMS:   Today reviewed systens as documented below  GENERAL HEALTH: feels well otherwise  SKIN: Refer to exam below  RESPIRATORY: denies shortness of breath with exertion  CARDIOVASCULAR: denies chest pain on exertion  GI: denies abdominal pain and denies heartburn  NEURO: denies headaches    EXAM:   There were no vitals taken for this visit.  GENERAL: well developed, well nourished, in no apparent distress  EXTREMITIES:   Skin incision is well-healed there is no sign of any infection no motion palpated the fusion site  X-rays taken 3 views show the fusion site to be uniting.  Good alignment is maintained  ASSESSMENT AND PLAN:   Diagnoses and all orders for this visit:    Status post foot surgery    Other orders  -     [facility] AMB POD XR - LT FOOT 3 VIEWS(AP,LAT,OBLIQUE) WT BEARING        Plan: Patient may return to an athletic shoe at this time but must not walk barefoot if he notices any abnormalities he will go back to the boot.  Follow-up with us again in 2 weeks we will reevaluate may be getting back to work.    The patient indicates understanding of these issues and agrees to the plan.    Porfirio Felix DPM

## 2024-06-04 NOTE — PROGRESS NOTES
[facility] Note    Name: Juan James    Date: 6/4/2024    Chief complaint: Diabetes (Pt present in clinic to today to go over Labs and discuss Diabetes mgmt. Tran report uploaded.A1c done in office. Pt did not schedule eye exam due to having recent surgery. /A1C 7.0 12/2023)       Subjective:    Initial HPI consult in October 2023  Juan James is a 58 year old male who presents for evaluation of T2DM management. A1C 7.0 12/2023    DM hx:  -Diagnosed with diabetes in 2019  -Re: potential DM medication contraindication: denies history of pancreatitis, personal/fam hx of medullary thyroid ca/MEN2, UTI/yeast infxn, gastroparesis  -Presence of associated DM complications (if positive, checkbox selected):  [] Macrovascular complications (CAD/CVA/PAD)  [] Neuropathy  [] Retinopathy  [x] nephropathy with positive urine microalbumin  [x] HTN  [] Hyperlipidemia    DM meds at first office visit: Farxiga 10 mg daily    Interval history  6/4/2024   Tran report uploaded; tran sensor was on and off due to procedures and imaging  Last A1c value was 7% done 6/4/2024.  Pt scheduled end of June for eye visit   DM Meds: dapagliflozin Tabs - 10 MG    Saw podiatry 6/4    Continuous Glucose Monitoring Interpretation  Juan James has undergone continuous glucose monitoring with the "[employer]style Tran   CGM with phone (connected to clinic profile).  The blood glucose tracings were evaluated for two weeks prior to office visit. Blood glucose tracings demonstrated areas of hyperglycemia however data was limited . There were no areas of hypoglycemia notedduring the weeks of evaluation.    Patient had sensor on/off for procedures and imaging     Previously trialed/failed DM meds: metformin but was discontinued, unsure why     History/Other:    Allergies, PMH, SocHx and FHx reviewed and updated as appropriate in Epic on    amLODIPine 10 MG Oral Tab Take 1 tablet (10 mg total) by mouth daily.       Continuous Blood Gluc Sensor (FREESTYLE NIKHIL 14 DAY SENSOR) Does not apply Misc USE TO CHECK FASTING BLOOD SUGAR IN THE MORNING AND 2 HOURS AFTER EACH MEAL DAILY AS DIRECTED      dapagliflozin (FARXIGA) 10 MG Oral Tab Take 15 mg by mouth daily.      Meloxicam 15 MG Oral Tab Take 1 tablet (15 mg total) by mouth daily.      valsartan 160 MG Oral Tab Take 2 tablets (320 mg total) by mouth daily.      rosuvastatin 20 MG Oral Tab Take 1 tablet (20 mg total) by mouth nightly.       No Known Allergies  Current Outpatient Medications   Medication Sig Dispense Refill    amLODIPine 10 MG Oral Tab Take 1 tablet (10 mg total) by mouth daily.      Continuous Blood Gluc Sensor (FREESTYLE NIKHIL 14 DAY SENSOR) Does not apply Misc USE TO CHECK FASTING BLOOD SUGAR IN THE MORNING AND 2 HOURS AFTER EACH MEAL DAILY AS DIRECTED      dapagliflozin (FARXIGA) 10 MG Oral Tab Take 15 mg by mouth daily.      Meloxicam 15 MG Oral Tab Take 1 tablet (15 mg total) by mouth daily.      valsartan 160 MG Oral Tab Take 2 tablets (320 mg total) by mouth daily.      rosuvastatin 20 MG Oral Tab Take 1 tablet (20 mg total) by mouth nightly.       Past Medical History:    Abdominal hernia    High blood pressure    High cholesterol    Neuropathy    feet    Other and unspecified hyperlipidemia    Type II or unspecified type diabetes mellitus without mention of complication, not stated as uncontrolled    Unspecified sleep apnea    NO TREATMENT    Visual impairment     Past Surgical History:   Procedure Laterality Date    Hernia surgery      Other surgical history  06/2023    2nd toe of left foot amputated    Shoulder surg proc unlisted       Social History     Socioeconomic History    Marital status:    Tobacco Use    Smoking status: Former     Types: Cigars, Cigarettes    Smokeless tobacco: Never   Vaping Use    Vaping status: Never Used   Substance and Sexual Activity    Alcohol use: Yes     Alcohol/week: 2.0 standard drinks of alcohol     Types:  2 Cans of beer per week     Comment: social    Drug use: No     Social Determinants of Health      [facility]     History reviewed. No pertinent family history.    ROS/Exam    REVIEW OF SYSTEMS: Ten point review of systems has been performed and is otherwise negative and/or non-contributory, except as described above.     VITALS  Vitals:    06/04/24 1456   BP: 140/70   Pulse: 74   SpO2: 96%   Weight: 230 lb (104.3 kg)   Height: 6' (1.829 m)       PHYSICAL EXAM  CONSTITUTIONAL:  awake, alert, cooperative, no apparent distress, and appears stated age  PSYCH: normal affect  LUNGS: breathing comfortably  CARDIOVASCULAR:  regular rate   ENT:  no palpable thyroid nodules     Labs/Imaging: Pertinent imaging reviewed.    Overall glucose control:  Lab Results   Component Value Date    A1C 7.0 (A) 06/04/2024    A1C 7.0 (H) 12/29/2023    A1C  10/12/2023      Comment:      Sent to [facility] for testing.        A1C 6.9 (H) 10/12/2023    A1C 7.7 (H) 06/23/2023       Assessment & Plan:     ICD-10-CM    1. Type 2 diabetes mellitus with other specified complication, without long-term current use of insulin (HCC)  E11.69 POC Hgb A1C          Juan James is a pleasant 58 year old male here for evaluation of:    #Diabetes- PMHx of Type 2 diabetes mellitus diagnosed in 2019. Last A1c value was 7% done 6/4/2024.  Goal <7%.   Importance of better glucose control in preventing onset/progression of end-organ damage discussed, as well as goals of therapy and clinical significance of A1C.  Patient has been on Farxiga since 2021;  6/2023 vascular duplex without PAD. Of note, the CANVAS and CANVAS-R trials reported a two-fold increase in the occurrence of lower limb amputations (LLAs) in participants receiving canagliflozin. These were predominantly toe or metatarsal amputations and they occurred in individuals without established peripheral vascular disease prior to study commencement. These findings  prompted the [place] Food and Drug Administration (FDA) to issue a caution on the use of canagliflozin in individuals at risk of amputation. Other SGLT2i have not been associated with an increased risk of LLA.   6/4 I have reviewed his mary jane and 18% CGM use with predominantly hyperglycemia however not enough data  - med changes: Continue Farxiga 10 mg daily  - SGLT2i instructions provided re: surgery, times of illness/dehydration    -Patient to reach out to me in 2 weeks once there is more data and I can discuss if metformin needs to be added at that time.  We also discussed that patient has been sedentary since his surgery so once his lifestyle changes it is likely his A1c will improve. We discussed the importance consistent, low carb diet as well as moderate exercise as well once patient is cleared by podiatry   -We reviewed his urine microalbumin and patient will establish care with nephrology  -Patient due for ophthalmology follow-up scheduled at [facility] for the end of June 2024  -Follows with podiatry regarding foot exam    -Surveillance for Diabetes Complications & Risks  Foot exam/neuropathy: Last Foot Exam: 06/04/24  -follows with podiatry  Retinopathy screening: No data recordedNo data recorded   -scheduled for June 20, 2024  Nephropathy screening:   Lab Results   Component Value Date    EGFRCR 63 12/29/2023    MICROALBCREA 2,156.2 (H) 04/01/2024      Blood pressure management:   BP Readings from Last 1 Encounters:   06/04/24 140/70   BP Meds: amLODIPine Tabs - 10 MG; valsartan Tabs - 160 MG   Patient to check blood pressure at home and keep PCP updated if blood pressure remains above goal    Lipids:   Lab Results   Component Value Date    LDL 93 12/29/2023    TRIG 210 (H) 12/29/2023   Cholesterol Meds: rosuvastatin Tabs - 20 MG   Due for repeat lipid panel December 2024       Return in about 3 months (around 9/5/2024).    A total of 30 minutes was spent today on obtaining history,  reviewing pertinent labs, evaluating patient, providing multiple treatment options, reinforcing diet/exercise and compliance, and completing documentation.      6/4/2024  Nery Raymundo, DO    Note to patient: The 21 Century Cures Act makes medical notes like these available to patients in the interest of transparency. However, be advised this is a medical document. It is intended as peer to peer communication. It is written in medical language and may contain abbreviations or verbiage that are unfamiliar. It may appear blunt or direct. Medical documents are intended to carry relevant information, facts as evident, and the clinical opinion of the practitioner.

## 2024-06-04 NOTE — PATIENT INSTRUCTIONS
Return Visit   [ x ] Physician in 3 months   [  ] In person or video visit  [  ] In person only    [ x ] After visit summary      It was great seeing you today!    Today we discussed your diabetes:  -Continue Farxiga  -We reviewed your urine microalbumin. Please follow up with the kidney doctor  -Please have the eye center fax us records once you see them at the end of June  -Continue to follow your blood pressure at home   -Continue vitamin D 2000 units daily     Take care!  -Dr. Raymundo

## 2024-06-05 ENCOUNTER — PATIENT MESSAGE (OUTPATIENT)
Facility: CLINIC | Age: 59
End: 2024-06-05

## 2024-06-05 NOTE — TELEPHONE ENCOUNTER
From: Juan James  To: Nery Raymundo  Sent: 6/5/2024 2:02 PM CDT  Subject: Free style mary jane     Good afternoon i have activated my free style mary jane

## 2024-06-25 ENCOUNTER — TELEPHONE (OUTPATIENT)
Dept: PODIATRY CLINIC | Facility: CLINIC | Age: 59
End: 2024-06-25

## 2024-06-26 ENCOUNTER — OFFICE VISIT (OUTPATIENT)
Dept: PODIATRY CLINIC | Facility: CLINIC | Age: 59
End: 2024-06-26

## 2024-06-26 DIAGNOSIS — Z98.890 STATUS POST FOOT SURGERY: Primary | ICD-10-CM

## 2024-06-26 PROCEDURE — 99213 OFFICE O/P EST LOW 20 MIN: CPT | Performed by: PODIATRIST

## 2024-06-26 NOTE — PROGRESS NOTES
Juan James is a 59 year old male.   Chief Complaint   Patient presents with    Follow - Up     Surgery on 2/09/24, metatarsal phalangeal joint fusion left foot with plate and screw fixation, fusion of the second toe with pin fixation left foot. Denies pain-          HPI:   Returns to the clinic he is now months postop no complaints of pain his foot feels very good thinks the swelling is down.  At today's visit reviewed nurse's history as taken above, allergies medications and medical history as documented below.  All changes duly noted  Allergies: Patient has no known allergies.   Current Outpatient Medications   Medication Sig Dispense Refill    PEG 3350-KCl-Na Bicarb-NaCl 420 g Oral Recon Soln Take as directed by physician 4000 mL 0    amLODIPine 10 MG Oral Tab Take 1 tablet (10 mg total) by mouth daily.      Continuous Blood Gluc Sensor (FREESTYLE NIKHIL 14 DAY SENSOR) Does not apply Misc USE TO CHECK FASTING BLOOD SUGAR IN THE MORNING AND 2 HOURS AFTER EACH MEAL DAILY AS DIRECTED      dapagliflozin (FARXIGA) 10 MG Oral Tab Take 15 mg by mouth daily.      Meloxicam 15 MG Oral Tab Take 1 tablet (15 mg total) by mouth daily.      valsartan 160 MG Oral Tab Take 2 tablets (320 mg total) by mouth daily.      rosuvastatin 20 MG Oral Tab Take 1 tablet (20 mg total) by mouth nightly.        Past Medical History:    Abdominal hernia    High blood pressure    High cholesterol    Neuropathy    feet    Other and unspecified hyperlipidemia    Type II or unspecified type diabetes mellitus without mention of complication, not stated as uncontrolled    Unspecified sleep apnea    NO TREATMENT    Visual impairment      Past Surgical History:   Procedure Laterality Date    Hernia surgery      Other surgical history  06/2023    2nd toe of left foot amputated    Shoulder surg proc unlisted        History reviewed. No pertinent family history.   Social History     Socioeconomic History    Marital status:     Tobacco Use    Smoking status: Former     Types: Cigars, Cigarettes    Smokeless tobacco: Never   Vaping Use    Vaping status: Never Used   Substance and Sexual Activity    Alcohol use: Yes     Alcohol/week: 2.0 standard drinks of alcohol     Types: 2 Cans of beer per week     Comment: social    Drug use: No           REVIEW OF SYSTEMS:   Today reviewed systens as documented below  GENERAL HEALTH: feels well otherwise  SKIN: Refer to exam below  RESPIRATORY: denies shortness of breath with exertion  CARDIOVASCULAR: denies chest pain on exertion  GI: denies abdominal pain and denies heartburn  NEURO: denies headaches    EXAM:   There were no vitals taken for this visit.  GENERAL: well developed, well nourished, in no apparent distress  EXTREMITIES:   1. Integument: The skin on his left foot was evaluated the incision line is well-healed fusion is in a straight position no motion palpated at the fusion site.   2. Vascular: Patient has palpable pulses   3. Neurologic: Intact sensorium   4. Musculoskeletal: Has good muscle strength.  No motion at the fusion site  X-rays were taken 3 views on the left foot shows the fusion site to be well-approximated healing and now.    ASSESSMENT AND PLAN:   Diagnoses and all orders for this visit:    Status post foot surgery    Other orders  -     [facility] AMB POD XR - LT FOOT 3 VIEWS(AP,LAT,OBLIQUE) WT BEARING        Plan: Ambulate on it since that it looks like the swelling is gone down healing is progressing advised patient at this visit that he could do more on his foot more walking but no athletic activities but he can do things such as cut the grass and get back to that activities on [place] 15 or 16 he wants to return to work but we will send him back for the first 3 weeks 4 hours a day and see how he does.    The patient indicates understanding of these issues and agrees to the plan.    Porfirio Felix DPM

## 2024-06-27 ENCOUNTER — TELEPHONE (OUTPATIENT)
Dept: PODIATRY CLINIC | Facility: CLINIC | Age: 59
End: 2024-06-27

## 2024-06-27 NOTE — TELEPHONE ENCOUNTER
Received Return to work form in forms department. Valid auth for the [employer] in 05/24 Protected health information request. Logged for processing.

## 2024-07-02 NOTE — TELEPHONE ENCOUNTER
Received another fax with 6 pages from [employer] for [employer] . Sending in the interoffice envelope and emailed already at 530 pm om 7/2.

## 2024-07-05 NOTE — TELEPHONE ENCOUNTER
Please try to avoid signing forms in the corner as it is not visible when printing and forms are not accepted this way. Thank you!    Dr. Felix    **The ACKNOWLEDGE button has been moved to the top right ribbon**    Please sign off on form if you agree to: short term disability AND Return to work   (place your signature on the first page only)  -From your Inbasket, Highlight the patient and click Chart  -Double click the 6/27/24 Forms Completion telephone encounter  -Scroll down to the Media section  -Click the blue Hyperlinks:   Std Dr. Felix 7/5/24 AND  Rtw Dr. Felix 7/5/24    -Click Acknowledge located in the top right ribbon/menu  -Drag the mouse into the blank space of the document and a + sign will appear. Left click to  electronically sign the document.    Thank you,    Alda

## 2024-07-08 NOTE — TELEPHONE ENCOUNTER
Forms signed and efaxed to the [employer] [phone] attn shannan bolanos, also uploaded to [email or web address].

## 2024-07-15 ENCOUNTER — OFFICE VISIT (OUTPATIENT)
Dept: NEPHROLOGY | Facility: CLINIC | Age: 59
End: 2024-07-15
Payer: COMMERCIAL

## 2024-07-15 ENCOUNTER — OFFICE VISIT (OUTPATIENT)
Dept: PODIATRY CLINIC | Facility: CLINIC | Age: 59
End: 2024-07-15
Payer: COMMERCIAL

## 2024-07-15 ENCOUNTER — LAB ENCOUNTER (OUTPATIENT)
Dept: LAB | Facility: HOSPITAL | Age: 59
End: 2024-07-15
Attending: INTERNAL MEDICINE
Payer: COMMERCIAL

## 2024-07-15 VITALS — SYSTOLIC BLOOD PRESSURE: 170 MMHG | DIASTOLIC BLOOD PRESSURE: 70 MMHG

## 2024-07-15 DIAGNOSIS — I10 ESSENTIAL HYPERTENSION: ICD-10-CM

## 2024-07-15 DIAGNOSIS — N18.31 STAGE 3A CHRONIC KIDNEY DISEASE (HCC): ICD-10-CM

## 2024-07-15 DIAGNOSIS — M96.89 DELAYED UNION AFTER OSTEOTOMY: ICD-10-CM

## 2024-07-15 DIAGNOSIS — R80.9 PROTEINURIA, UNSPECIFIED TYPE: ICD-10-CM

## 2024-07-15 DIAGNOSIS — Z98.890 STATUS POST FOOT SURGERY: Primary | ICD-10-CM

## 2024-07-15 LAB
ALBUMIN SERPL-MCNC: 3.3 G/DL (ref 3.4–5)
ALBUMIN/GLOB SERPL: 0.8 {RATIO} (ref 1–2)
ALP LIVER SERPL-CCNC: 91 U/L
ALT SERPL-CCNC: 30 U/L
ANION GAP SERPL CALC-SCNC: 1 MMOL/L (ref 0–18)
AST SERPL-CCNC: 18 U/L (ref 15–37)
BILIRUB SERPL-MCNC: 0.4 MG/DL (ref 0.1–2)
BILIRUB UR QL STRIP.AUTO: NEGATIVE
BUN BLD-MCNC: 36 MG/DL (ref 9–23)
CALCIUM BLD-MCNC: 9.5 MG/DL (ref 8.5–10.1)
CHLORIDE SERPL-SCNC: 112 MMOL/L (ref 98–112)
CLARITY UR REFRACT.AUTO: CLEAR
CO2 SERPL-SCNC: 26 MMOL/L (ref 21–32)
CREAT BLD-MCNC: 1.59 MG/DL
CREAT UR-SCNC: 68.3 MG/DL
EGFRCR SERPLBLD CKD-EPI 2021: 50 ML/MIN/1.73M2 (ref 60–?)
FASTING STATUS PATIENT QL REPORTED: NO
GLOBULIN PLAS-MCNC: 3.9 G/DL (ref 2.8–4.4)
GLUCOSE BLD-MCNC: 211 MG/DL (ref 70–99)
GLUCOSE UR STRIP.AUTO-MCNC: >1000 MG/DL
KETONES UR STRIP.AUTO-MCNC: NEGATIVE MG/DL
LEUKOCYTE ESTERASE UR QL STRIP.AUTO: NEGATIVE
MICROALBUMIN UR-MCNC: 249 MG/DL
MICROALBUMIN/CREAT 24H UR-RTO: 3645.7 UG/MG (ref ?–30)
NITRITE UR QL STRIP.AUTO: NEGATIVE
OSMOLALITY SERPL CALC.SUM OF ELEC: 303 MOSM/KG (ref 275–295)
PH UR STRIP.AUTO: 5.5 [PH] (ref 5–8)
POTASSIUM SERPL-SCNC: 5.4 MMOL/L (ref 3.5–5.1)
PROT SERPL-MCNC: 7.2 G/DL (ref 6.4–8.2)
PROT UR STRIP.AUTO-MCNC: 300 MG/DL
SODIUM SERPL-SCNC: 139 MMOL/L (ref 136–145)
SP GR UR STRIP.AUTO: 1.02 (ref 1–1.03)
UROBILINOGEN UR STRIP.AUTO-MCNC: NORMAL MG/DL

## 2024-07-15 PROCEDURE — 99213 OFFICE O/P EST LOW 20 MIN: CPT | Performed by: PODIATRIST

## 2024-07-15 PROCEDURE — 36415 COLL VENOUS BLD VENIPUNCTURE: CPT

## 2024-07-15 PROCEDURE — 81001 URINALYSIS AUTO W/SCOPE: CPT

## 2024-07-15 PROCEDURE — 82043 UR ALBUMIN QUANTITATIVE: CPT

## 2024-07-15 PROCEDURE — 82570 ASSAY OF URINE CREATININE: CPT

## 2024-07-15 PROCEDURE — 80053 COMPREHEN METABOLIC PANEL: CPT

## 2024-07-15 RX ORDER — HYDROCHLOROTHIAZIDE 25 MG/1
25 TABLET ORAL DAILY
Qty: 90 TABLET | Refills: 3 | Status: SHIPPED | OUTPATIENT
Start: 2024-07-15

## 2024-07-15 NOTE — PROGRESS NOTES
Consult Note      REASON FOR CONSULT: Chronic kidney disease stage III/proteinuria/hypertension         HPI:   Juan James is a 59 year old male with   Chief Complaint   Patient presents with    Consult     Ref'd by Dr. Raymundo for proteinuria     Natalie Kenny DO    Mr. James was seen in the nephrology clinic today in consultation for management of chronic kidney disease stage IIIa and proteinuria in the setting of diabetes and hypertension.  As you know this is a 59-year-old man who had not seen physicians regularly, however in 2019 was diagnosed with both diabetes and hypertension.  He reports that hemoglobin A1c at that time was 13%.  He has never been on insulin but was on metformin and is now currently taking Farxiga.  His diabetes is complicated by presence of diabetic foot ulcers on his first and fifth left toe for which he had surgical intervention.  Review of records demonstrates that his kidney function was essentially normal as recently as June of last year, however in October and December of last year the creatinine was elevated at 1.39 and 1.32 mg/dL respectively.  He had urine studies performed over the past year as well with a microalbumin to creatinine ratio of 2.8 g, 2.7 g and 2.1 g in December, February and April.  He has not had a formal urinalysis to assess for microhematuria and denies gross hematuria.  Blood pressures appear to be somewhat variable although he is fairly hypertensive in the office today and states that his home systolic readings are typically 1 50-1 60 or so.    ROS:    Denie fever/chills  Denies wt loss/gain  Denies HA or visual changes  Denies CP or palpitations  Denies SOB/cough/hemoptysis  Denies abd or flank pain  Denies N/V/D  Denies change in urinary habits or gross hematuria  Denies LE edema  Denies skin rashes/myalgias/arthralgias      PMH:  Past Medical History:    Abdominal hernia    Diabetes mellitus (HCC)    Heartburn    High blood pressure     High cholesterol    Neuropathy    feet    Other and unspecified hyperlipidemia    Pain in joints    Type II or unspecified type diabetes mellitus without mention of complication, not stated as uncontrolled    Unspecified sleep apnea    NO TREATMENT    Visual impairment    Wears glasses         PSH:  Past Surgical History:   Procedure Laterality Date    Colonoscopy      Hernia surgery      Other surgical history  06/2023    2nd toe of left foot amputated    Shoulder surg proc unlisted           Medications (Active prior to today's visit):  Current Outpatient Medications   Medication Sig Dispense Refill    Dapagliflozin Propanediol (FARXIGA OR) Take by mouth daily.      amLODIPine 10 MG Oral Tab Take 1 tablet (10 mg total) by mouth daily.      Continuous Blood Gluc Sensor (QosmosYLE NIKHIL 14 DAY SENSOR) Does not apply Misc USE TO CHECK FASTING BLOOD SUGAR IN THE MORNING AND 2 HOURS AFTER EACH MEAL DAILY AS DIRECTED      Meloxicam 15 MG Oral Tab Take 1 tablet (15 mg total) by mouth daily.      valsartan 160 MG Oral Tab Take 2 tablets (320 mg total) by mouth daily.      rosuvastatin 20 MG Oral Tab Take 1 tablet (20 mg total) by mouth nightly.           Allergies:  No Known Allergies    Social History:  Social History     Socioeconomic History    Marital status:    Tobacco Use    Smoking status: Former     Types: Cigars, Cigarettes    Smokeless tobacco: Never   Vaping Use    Vaping status: Never Used   Substance and Sexual Activity    Alcohol use: Yes     Alcohol/week: 3.0 standard drinks of alcohol     Types: 3 Cans of beer per week     Comment: social    Drug use: No          Family History:  History reviewed. No pertinent family history.         PHYSICAL EXAM:   There were no vitals taken for this visit.   Wt Readings from Last 6 Encounters:   07/09/24 235 lb (106.6 kg)   06/04/24 230 lb (104.3 kg)   02/09/24 230 lb 9.6 oz (104.6 kg)   10/30/23 232 lb (105.2 kg)   10/16/23 229 lb 15 oz (104.3 kg)   06/23/23 226  lb (102.5 kg)     General: Alert and oriented in no apparent distress.  HEENT: No scleral icterus, MMM  Neck: Supple, no REJI or thyromegaly  Cardiac: Regular rate and rhythm, S1, S2 normal, no murmur or rub  Lungs: Clear without wheezes, rales, rhonchi.    Extremities: Without clubbing, cyanosis or edema.  Neurologic:  normal affect, cranial nerves grossly intact, moving all extremities  Skin: Warm and dry, no rashes      LABS:     Lab Results   Component Value Date     (H) 12/29/2023    BUN 37 (H) 12/29/2023    BUNCREA 20.8 (H) 05/14/2019    CREATSERUM 1.32 (H) 12/29/2023    ANIONGAP 2 12/29/2023    GFRNAA 104 05/14/2019    GFRAA 120 05/14/2019    CA 9.2 12/29/2023    OSMOCALC 304 (H) 12/29/2023    ALKPHO 80 12/29/2023    AST 19 12/29/2023    ALT 30 12/29/2023    BILT 0.4 12/29/2023    TP 7.2 12/29/2023    ALB 3.4 12/29/2023    GLOBULIN 3.8 12/29/2023     12/29/2023    K 4.9 12/29/2023     (H) 12/29/2023    CO2 26.0 12/29/2023     Lab Results   Component Value Date    WBC 6.9 12/29/2023    RBC 4.00 (L) 12/29/2023    HGB 11.7 (L) 12/29/2023    HCT 36.9 (L) 12/29/2023    .0 12/29/2023    MCV 92.3 12/29/2023    MCH 29.3 12/29/2023    MCHC 31.7 12/29/2023    RDW 13.1 12/29/2023    NEPRELIM 3.79 10/12/2023    NEPERCENT 63.2 10/12/2023    LYPERCENT 26.7 10/12/2023    MOPERCENT 7.0 10/12/2023    EOPERCENT 2.2 10/12/2023    BAPERCENT 0.7 10/12/2023    NE 3.79 10/12/2023    LYMABS 1.60 10/12/2023    MOABSO 0.42 10/12/2023    EOABSO 0.13 10/12/2023    BAABSO 0.04 10/12/2023     Lab Results   Component Value Date    MALBP 168.40 04/01/2024    CREUR 78.10 04/01/2024     No results found for: \"COLORUR\", \"CLARITY\", \"SPECGRAVITY\", \"GLUUR\", \"BILUR\", \"KETUR\", \"BLOODURINE\", \"PHURINE\", \"PROUR\", \"UROBILINOGEN\", \"NITRITE\", \"LEUUR\", \"ASCACIDURINE\", \"NMIC\", \"WBCUR\", \"RBCUR\", \"EPIUR\", \"HYALCASTURN\", \"BACUR\", \"CAOXUR\", \"HYLUR\"       ASSESSMENT/PLAN:          #1.  Chronic kidney disease stage III-I suspect that his  elevated creatinine is due to diabetes and hypertension although, as mentioned, he was diagnosed with both only recently and typically these do not develop until the patient has been diabetic and/or hypertensive for more significant period of time.  That being said he had not been seeing physicians regularly and it certainly possible that he has had very longstanding diabetes and hypertension.  That being said his clinical pattern is somewhat unusual given that his serum creatinine levels had been charlie as recently as June of last year and then were closer to 1.4 mg/dL in October and December 2023.  I have asked him to repeat a metabolic panel today we will also check urine studies as well particularly looking for microhematuria in the setting of ongoing proteinuria.  Regarding his chronic kidney disease we discussed the importance of good blood glucose control and his most recent hemoglobin A1c was at 7.0.  In addition we recommend improved blood pressure control including the use of medications which block the renin-angiotensin system.  He is on valsartan.  Additionally Farxiga does provide renal protection.    #2.  Proteinuria-as mentioned above he had a significant elevation in microalbumin to creatinine ratio which certainly could be attributed to diabetic nephropathy.  However, I have asked him to have a formal urinalysis done so we can assess for microhematuria and the possibility of an ongoing glomerular process.  If this were present then we discussed the possibility of performing a renal biopsy for diagnostic and prognostic purposes.  It may be reasonable to do this even if he does not have microhematuria given his somewhat unusual clinical picture.  Again I have asked for a urinalysis and we will also check another microalbumin to creatinine ratio today    #3.  Hypertension-blood pressure is significantly elevated in the office today and remains so on repeat testing.  I have added hydrochlorothiazide 25  mg to his current amlodipine and valsartan dosing    The above was discussed at length with the patient the office today.  Thank you very much for allowing me to participate in care of your patient.  Please do not hesitate to call with questions or concerns.          Imtiaz Keyes MD  7/15/2024  3:21 PM

## 2024-07-15 NOTE — PROGRESS NOTES
Juan James is a 59 year old male.   Chief Complaint   Patient presents with    Post-Op     Surgery 02/09/24- Metatarsal joint fusion left foot with plate and screw fixation - patient denies pain - fbg 149- A1c 7.0 06/04/24          HPI:   Patient now 5-month status post surgery doing well does not notice any pain or swelling.  At today's visit reviewed nurse's history as taken above, allergies medications and medical history as documented below.  All changes duly noted  Allergies: Patient has no known allergies.   Current Outpatient Medications   Medication Sig Dispense Refill    Dapagliflozin Propanediol (FARXIGA OR) Take by mouth daily.      amLODIPine 10 MG Oral Tab Take 1 tablet (10 mg total) by mouth daily.      Continuous Blood Gluc Sensor (FREESTYLE NIKHIL 14 DAY SENSOR) Does not apply Misc USE TO CHECK FASTING BLOOD SUGAR IN THE MORNING AND 2 HOURS AFTER EACH MEAL DAILY AS DIRECTED      Meloxicam 15 MG Oral Tab Take 1 tablet (15 mg total) by mouth daily.      valsartan 160 MG Oral Tab Take 2 tablets (320 mg total) by mouth daily.      rosuvastatin 20 MG Oral Tab Take 1 tablet (20 mg total) by mouth nightly.        Past Medical History:    Abdominal hernia    Diabetes mellitus (HCC)    Heartburn    High blood pressure    High cholesterol    Neuropathy    feet    Other and unspecified hyperlipidemia    Pain in joints    Type II or unspecified type diabetes mellitus without mention of complication, not stated as uncontrolled    Unspecified sleep apnea    NO TREATMENT    Visual impairment    Wears glasses      Past Surgical History:   Procedure Laterality Date    Colonoscopy      Hernia surgery      Other surgical history  06/2023    2nd toe of left foot amputated    Shoulder surg proc unlisted        History reviewed. No pertinent family history.   Social History     Socioeconomic History    Marital status:    Tobacco Use    Smoking status: Former     Types: Cigars, Cigarettes    Smokeless  tobacco: Never   Vaping Use    Vaping status: Never Used   Substance and Sexual Activity    Alcohol use: Yes     Alcohol/week: 3.0 standard drinks of alcohol     Types: 3 Cans of beer per week     Comment: social    Drug use: No           REVIEW OF SYSTEMS:   Today reviewed systens as documented below  GENERAL HEALTH: feels well otherwise  SKIN: Refer to exam below  RESPIRATORY: denies shortness of breath with exertion  CARDIOVASCULAR: denies chest pain on exertion  GI: denies abdominal pain and denies heartburn  NEURO: denies headaches    EXAM:   There were no vitals taken for this visit.  GENERAL: well developed, well nourished, in no apparent distress  EXTREMITIES:   1. Integument: Skin incision on his left foot is well-healed minimal edema minimal erythema   2. Vascular: Patient has palpable pulse   3. Neurologic: Patient has diminished pedal sensorium diabetic peripheral neuropathy   4. Musculoskeletal: Motion and range of motion of the first metatarsal and first ray.  X-rays taken 3 views show healing to be occurring to the union is progressing.    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    The patient indicates understanding of these issues and agrees to the plan.    Porfirio Felix DPM

## 2024-07-22 ENCOUNTER — TELEPHONE (OUTPATIENT)
Dept: NEPHROLOGY | Facility: CLINIC | Age: 59
End: 2024-07-22

## 2024-07-22 DIAGNOSIS — N18.30 STAGE 3 CHRONIC KIDNEY DISEASE, UNSPECIFIED WHETHER STAGE 3A OR 3B CKD (HCC): ICD-10-CM

## 2024-07-22 DIAGNOSIS — R31.29 MICROHEMATURIA: ICD-10-CM

## 2024-07-22 DIAGNOSIS — R80.9 PROTEINURIA, UNSPECIFIED TYPE: Primary | ICD-10-CM

## 2024-07-24 NOTE — TELEPHONE ENCOUNTER
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  (place your signature on the first page only)    -From your Inbasket, Highlight the patient and click Chart   -Double click the 06/27/2024 Forms Completion telephone encounter  -Scroll down to the Media section   -Click the blue Hyperlink: Return to work Dr Felix 07/24/24  -Click Acknowledge located in the top right ribbon/menu   -Drag the mouse into the blank space of the document and a + sign will appear. Left click to   electronically sign the document.     Thank you,    Lissy

## 2024-07-25 NOTE — TELEPHONE ENCOUNTER
Return to work forms faxed to [employer] [phone], confirmation receive, Truist message sent.

## 2024-07-30 DIAGNOSIS — R80.9 PROTEINURIA, UNSPECIFIED TYPE: Primary | ICD-10-CM

## 2024-08-05 PROBLEM — Z86.010 HISTORY OF ADENOMATOUS POLYP OF COLON: Status: ACTIVE | Noted: 2024-08-05

## 2024-08-05 PROBLEM — D12.0 BENIGN NEOPLASM OF CECUM: Status: ACTIVE | Noted: 2024-08-05

## 2024-08-05 PROBLEM — Z86.0101 HISTORY OF ADENOMATOUS POLYP OF COLON: Status: ACTIVE | Noted: 2024-08-05

## 2024-08-08 ENCOUNTER — OFFICE VISIT (OUTPATIENT)
Dept: PODIATRY CLINIC | Facility: CLINIC | Age: 59
End: 2024-08-08

## 2024-08-08 DIAGNOSIS — Z98.890 STATUS POST FOOT SURGERY: Primary | ICD-10-CM

## 2024-08-08 DIAGNOSIS — L97.521 SKIN ULCER OF TOE OF LEFT FOOT, LIMITED TO BREAKDOWN OF SKIN (HCC): ICD-10-CM

## 2024-08-08 PROCEDURE — 99213 OFFICE O/P EST LOW 20 MIN: CPT | Performed by: PODIATRIST

## 2024-08-14 NOTE — PROGRESS NOTES
Juan James is a 59 year old male.   Chief Complaint   Patient presents with    Toe Pain     L 5th toe- blood blister- onset- 8/06/2024- denies injury- FBS this am= 115         HPI:   Turns on his left foot he is got a sore developing a blister on his fifth toe he has concerns over it.  At today's visit reviewed nurse's history as taken above, allergies medications and medical history as documented below.  All changes duly noted  Allergies: Patient has no known allergies.   Current Outpatient Medications   Medication Sig Dispense Refill    PEG 3350-KCl-Na Bicarb-NaCl 420 g Oral Recon Soln Take as directed by physician 4000 mL 0    hydroCHLOROthiazide 25 MG Oral Tab Take 1 tablet (25 mg total) by mouth daily. 90 tablet 3    Dapagliflozin Propanediol (FARXIGA OR) Take 1 tablet by mouth daily.      amLODIPine 10 MG Oral Tab Take 1 tablet (10 mg total) by mouth daily.      Continuous Blood Gluc Sensor (FREESTYLE NIKHIL 14 DAY SENSOR) Does not apply Misc USE TO CHECK FASTING BLOOD SUGAR IN THE MORNING AND 2 HOURS AFTER EACH MEAL DAILY AS DIRECTED      Meloxicam 15 MG Oral Tab Take 1 tablet (15 mg total) by mouth daily.      valsartan 160 MG Oral Tab Take 2 tablets (320 mg total) by mouth daily.      rosuvastatin 20 MG Oral Tab Take 1 tablet (20 mg total) by mouth nightly.        Past Medical History:    Abdominal hernia    Diabetes mellitus (HCC)    Heartburn    High blood pressure    High cholesterol    Neuropathy    feet    Other and unspecified hyperlipidemia    Pain in joints    Renal disorder    Type II or unspecified type diabetes mellitus without mention of complication, not stated as uncontrolled    Unspecified sleep apnea    NO TREATMENT    Visual impairment    glasses    Wears glasses      Past Surgical History:   Procedure Laterality Date    Colonoscopy      Hernia surgery      Other surgical history  06/2023    2nd toe of left foot amputated    Shoulder surg proc unlisted        History reviewed.  No pertinent family history.   Social History     Socioeconomic History    Marital status:    Tobacco Use    Smoking status: Former     Types: Cigars, Cigarettes    Smokeless tobacco: Never   Vaping Use    Vaping status: Never Used   Substance and Sexual Activity    Alcohol use: Yes     Alcohol/week: 3.0 standard drinks of alcohol     Types: 3 Cans of beer per week     Comment: social    Drug use: No           REVIEW OF SYSTEMS:   Today reviewed systens as documented below  GENERAL HEALTH: feels well otherwise  SKIN: Refer to exam below  RESPIRATORY: denies shortness of breath with exertion  CARDIOVASCULAR: denies chest pain on exertion  GI: denies abdominal pain and denies heartburn  NEURO: denies headaches    EXAM:   There were no vitals taken for this visit.  GENERAL: well developed, well nourished, in no apparent distress  EXTREMITIES:   1. Integument: The skin on his left foot was evaluated surgical sites all look good.  He has a pinch callus on the fifth toe which when trimmed reveals a small superficial ulceration limited to the breakdown of skin   2. Vascular: Patient has palpable pulses on the left   3. Neurologic: Patient has diabetic peripheral   4. Musculoskeletal: Has an adductovarus hammertoe of the fifth digit which mildly underlapped the fourth toe.    ASSESSMENT AND PLAN:   Diagnoses and all orders for this visit:    Status post foot surgery    Skin ulcer of toe of left foot, limited to breakdown of skin (HCC)        Plan: Today trimmed down and debrided the area on the fifth toe Band-Aid antibiotic ointment applied.  He will continue with local wound care gave him a separators of the toe will not underwriting the fourth and I will see him for his next postop visit and wound reevaluated at that time it is not infected did not require oral antibiotics but he will watch for signs of infection call immediately should they occur.    The patient indicates understanding of these issues and agrees  to the plan.    Porfirio Felix DPM

## 2024-08-15 ENCOUNTER — NURSE ONLY (OUTPATIENT)
Dept: LAB | Facility: HOSPITAL | Age: 59
End: 2024-08-15
Attending: INTERNAL MEDICINE
Payer: COMMERCIAL

## 2024-08-15 ENCOUNTER — HOSPITAL ENCOUNTER (OUTPATIENT)
Dept: CT IMAGING | Facility: HOSPITAL | Age: 59
Discharge: HOME | End: 2024-08-15
Attending: INTERNAL MEDICINE
Payer: COMMERCIAL

## 2024-08-15 VITALS
DIASTOLIC BLOOD PRESSURE: 72 MMHG | BODY MASS INDEX: 32.51 KG/M2 | OXYGEN SATURATION: 96 % | RESPIRATION RATE: 16 BRPM | HEIGHT: 72 IN | TEMPERATURE: 98 F | SYSTOLIC BLOOD PRESSURE: 144 MMHG | HEART RATE: 65 BPM | WEIGHT: 240 LBS

## 2024-08-15 DIAGNOSIS — R80.9 PROTEINURIA, UNSPECIFIED TYPE: ICD-10-CM

## 2024-08-15 DIAGNOSIS — R80.9 PROTEINURIA: ICD-10-CM

## 2024-08-15 DIAGNOSIS — R80.9 PROTEINURIA: Primary | ICD-10-CM

## 2024-08-15 LAB
BASOPHILS # BLD AUTO: 0.03 X10(3) UL (ref 0–0.2)
BASOPHILS NFR BLD AUTO: 0.4 %
EOSINOPHIL # BLD AUTO: 0.13 X10(3) UL (ref 0–0.7)
EOSINOPHIL NFR BLD AUTO: 1.7 %
ERYTHROCYTE [DISTWIDTH] IN BLOOD BY AUTOMATED COUNT: 12.5 %
GLUCOSE BLD-MCNC: 143 MG/DL (ref 70–99)
HCT VFR BLD AUTO: 36 %
HGB BLD-MCNC: 11.8 G/DL
IMM GRANULOCYTES # BLD AUTO: 0.03 X10(3) UL (ref 0–1)
IMM GRANULOCYTES NFR BLD: 0.4 %
INR BLD: 0.94 (ref 0.8–1.2)
LYMPHOCYTES # BLD AUTO: 1.61 X10(3) UL (ref 1–4)
LYMPHOCYTES NFR BLD AUTO: 21.4 %
MCH RBC QN AUTO: 29.4 PG (ref 26–34)
MCHC RBC AUTO-ENTMCNC: 32.8 G/DL (ref 31–37)
MCV RBC AUTO: 89.8 FL
MONOCYTES # BLD AUTO: 0.48 X10(3) UL (ref 0.1–1)
MONOCYTES NFR BLD AUTO: 6.4 %
NEUTROPHILS # BLD AUTO: 5.25 X10 (3) UL (ref 1.5–7.7)
NEUTROPHILS # BLD AUTO: 5.25 X10(3) UL (ref 1.5–7.7)
NEUTROPHILS NFR BLD AUTO: 69.7 %
PLATELET # BLD AUTO: 173 10(3)UL (ref 150–450)
PROTHROMBIN TIME: 12.6 SECONDS (ref 11.6–14.8)
RBC # BLD AUTO: 4.01 X10(6)UL
WBC # BLD AUTO: 7.5 X10(3) UL (ref 4–11)

## 2024-08-15 PROCEDURE — 36415 COLL VENOUS BLD VENIPUNCTURE: CPT

## 2024-08-15 PROCEDURE — 99152 MOD SED SAME PHYS/QHP 5/>YRS: CPT | Performed by: INTERNAL MEDICINE

## 2024-08-15 PROCEDURE — 50200 RENAL BIOPSY PERQ: CPT | Performed by: INTERNAL MEDICINE

## 2024-08-15 PROCEDURE — 88305 TISSUE EXAM BY PATHOLOGIST: CPT | Performed by: INTERNAL MEDICINE

## 2024-08-15 PROCEDURE — 88313 SPECIAL STAINS GROUP 2: CPT | Performed by: INTERNAL MEDICINE

## 2024-08-15 PROCEDURE — 36415 COLL VENOUS BLD VENIPUNCTURE: CPT | Performed by: INTERNAL MEDICINE

## 2024-08-15 PROCEDURE — 88350 IMFLUOR EA ADDL 1ANTB STN PX: CPT | Performed by: INTERNAL MEDICINE

## 2024-08-15 PROCEDURE — 77012 CT SCAN FOR NEEDLE BIOPSY: CPT | Performed by: INTERNAL MEDICINE

## 2024-08-15 PROCEDURE — 88346 IMFLUOR 1ST 1ANTB STAIN PX: CPT | Performed by: INTERNAL MEDICINE

## 2024-08-15 PROCEDURE — 82962 GLUCOSE BLOOD TEST: CPT

## 2024-08-15 PROCEDURE — 88348 ELECTRON MICROSCOPY DX: CPT | Performed by: INTERNAL MEDICINE

## 2024-08-15 PROCEDURE — 85610 PROTHROMBIN TIME: CPT

## 2024-08-15 PROCEDURE — 85025 COMPLETE CBC W/AUTO DIFF WBC: CPT | Performed by: RADIOLOGY

## 2024-08-15 RX ORDER — MIDAZOLAM HYDROCHLORIDE 1 MG/ML
INJECTION INTRAMUSCULAR; INTRAVENOUS
Status: COMPLETED
Start: 2024-08-15 | End: 2024-08-15

## 2024-08-15 RX ORDER — MIDAZOLAM HYDROCHLORIDE 1 MG/ML
1 INJECTION INTRAMUSCULAR; INTRAVENOUS EVERY 5 MIN PRN
Status: ACTIVE | OUTPATIENT
Start: 2024-08-15 | End: 2024-08-15

## 2024-08-15 RX ORDER — SODIUM CHLORIDE 9 MG/ML
INJECTION, SOLUTION INTRAVENOUS CONTINUOUS
Status: ACTIVE | OUTPATIENT
Start: 2024-08-15 | End: 2024-08-15

## 2024-08-15 RX ORDER — FLUMAZENIL 0.1 MG/ML
0.2 INJECTION INTRAVENOUS AS NEEDED
Status: DISCONTINUED | OUTPATIENT
Start: 2024-08-15 | End: 2024-08-17

## 2024-08-15 RX ORDER — NALOXONE HYDROCHLORIDE 0.4 MG/ML
0.08 INJECTION, SOLUTION INTRAMUSCULAR; INTRAVENOUS; SUBCUTANEOUS AS NEEDED
Status: DISCONTINUED | OUTPATIENT
Start: 2024-08-15 | End: 2024-08-17

## 2024-08-15 RX ADMIN — MIDAZOLAM HYDROCHLORIDE 1 MG: 1 INJECTION INTRAMUSCULAR; INTRAVENOUS at 10:47:00

## 2024-08-15 RX ADMIN — SODIUM CHLORIDE: 9 INJECTION, SOLUTION INTRAVENOUS at 10:24:00

## 2024-08-15 RX ADMIN — MIDAZOLAM HYDROCHLORIDE 1 MG: 1 INJECTION INTRAMUSCULAR; INTRAVENOUS at 10:42:00

## 2024-08-15 NOTE — PROCEDURES
[facility]   part of [facility]  Procedure Note    Juan James Patient Status:  Outpatient    1965 MRN [id number]   Location [facility] CT Attending Tesfaye Kaur MD   Hosp Day # 0 PCP Natalie Kenny DO     Procedure: right kidney bx    Pre-Procedure Diagnosis:  proteinuria    Post-Procedure Diagnosis: same    Anesthesia:  Sedation    Findings:  right kidney normal appearing    Specimens: 3 18 g cores    Blood Loss:  none    Tourniquet Time: none  Complications:  None  Drains:  none    Secondary Diagnosis:  n/a    Pritesh Dickey MD  8/15/2024

## 2024-08-15 NOTE — DISCHARGE INSTRUCTIONS
Discharge/After Visit [facility] - Department of Radiology  Biopsy - Renal (Kidney), Liver, Lung, Bone, Retroperitoneal Location    Post Sedation  Follow these guidelines:  You should be watched overnight by a responsible adult. This person should make sure your condition remains stable.   Do not drink any alcohol for 24 hours after the procedure.  Don’t drive, operate dangerous machinery, make important business or personal decisions, or sign legal documents for 24 hours after the procedure.  Note: Your healthcare provider may tell you not to take any medicine by mouth for pain or sleep for a period of time. These medicines may react with the medicine you were given during your procedure. This could cause a much stronger response than usual.     Activity:  Take it easy for the rest of the day after your biopsy.   You may be sore at the site of the biopsy for the next 5 days.   Do not do any strenuous exercises or lift over five pounds for the next 24 hours.     Biopsy Site:  Keep a bandage on the biopsy site for 24 hours after the procedure.   You may shower after 24 hours, but no soaking in a bathtub for 48 hours.     Lung Biopsy: If chest pain or shortness of breath occurs, go to the nearest Emergency Room.   Liver Biopsy: If there is any increase in right-sided back, shoulder, or abdominal pain, go to the nearest Emergency Room. Call your doctor for unusual dizziness or weakness.   Renal Biopsy: Report any bloody urine, bleeding at the site, swelling, or pain to your ordering provider,      Diet: Drink plenty of fluids and resume your usual home diet. A slow return to normal foods minimizes nausea.    Pain Management:   You may use over-the-counter or prescribed pain relief medication if it is not contraindicated by another condition.     Medications:  You may resume your usual home medications. You may resume blood thinners 24 hours after the procedure if there is no bleeding from/hematoma at  the puncture site or bloody urine (Renal Biopsy only)     When to seek medical advice:  Call the provider that ordered the biopsy with any questions and to discuss test results. These may also be available in your [place] My Chart. You may also contact the Radiology Nurse at [phone], M-F, 8-5 with any additional questions or concerns.  Dial [phone] and ask the  to page the on-call IR Radiologist if any of these occur:    A change in color or temperature of the area where the biopsy was performed.  You develop increasing pain or shortness of breath.  Unusual drowsiness, weakness, or dizziness.  Unusual vomiting.     IF YOU FEEL YOU ARE EXPERIENCING AN EMERGENCY,   CALL 911 OR GO TO THE NEAREST EMERGENCY ROOM      4.2.24 MO/  Radiology

## 2024-08-15 NOTE — IMAGING NOTE
Juan is here for CT guided renal biopsy. IV access to saline lock. 0.9 NS IV initiated to maintain patency.    Informed consent obtained by Dr Dickey. Positioned pt on scanner. Obtained biopsy. Specimen sent to Pathology.      Presently denies pain. Tolerated procedure well. Please see flowsheets for vital signs and/or additional information.    Transported pt to  [id number] accompanied by Rad RN and transport tech. Bedside report given to Triny Luli-Op RN. Biopsy site/drsg verified C/D/I.

## 2024-08-16 ENCOUNTER — TELEPHONE (OUTPATIENT)
Dept: NEPHROLOGY | Facility: CLINIC | Age: 59
End: 2024-08-16

## 2024-08-16 NOTE — TELEPHONE ENCOUNTER
I called pt this afternoon.  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  Focus on diabetic and hypertensive control.  Losartan and faxiga should provide renal protection as well.  Has f/u with me in 2 months or so      Imtiaz Keyes MD  8/16/2024  1:38 PM

## 2024-08-19 ENCOUNTER — MED REC SCAN ONLY (OUTPATIENT)
Dept: NEPHROLOGY | Facility: CLINIC | Age: 59
End: 2024-08-19

## 2024-08-24 ENCOUNTER — LAB ENCOUNTER (OUTPATIENT)
Dept: LAB | Age: 59
End: 2024-08-24
Attending: INTERNAL MEDICINE
Payer: COMMERCIAL

## 2024-08-24 DIAGNOSIS — E55.9 VITAMIN D DEFICIENCY: ICD-10-CM

## 2024-08-24 DIAGNOSIS — R80.9 PROTEINURIA, UNSPECIFIED TYPE: ICD-10-CM

## 2024-08-24 DIAGNOSIS — I10 HYPERTENSION, ESSENTIAL: ICD-10-CM

## 2024-08-24 DIAGNOSIS — Z12.5 ENCOUNTER FOR SCREENING FOR MALIGNANT NEOPLASM OF PROSTATE: Primary | ICD-10-CM

## 2024-08-24 DIAGNOSIS — E11.21 DIABETIC GLOMERULOPATHY (HCC): ICD-10-CM

## 2024-08-24 DIAGNOSIS — R31.29 MICROHEMATURIA: ICD-10-CM

## 2024-08-24 DIAGNOSIS — E78.5 HYPERLIPIDEMIA: ICD-10-CM

## 2024-08-24 DIAGNOSIS — N18.30 STAGE 3 CHRONIC KIDNEY DISEASE, UNSPECIFIED WHETHER STAGE 3A OR 3B CKD (HCC): ICD-10-CM

## 2024-08-24 LAB
ALBUMIN SERPL-MCNC: 4.8 G/DL (ref 3.2–4.8)
ALBUMIN/GLOB SERPL: 1.5 {RATIO} (ref 1–2)
ALP LIVER SERPL-CCNC: 79 U/L
ALT SERPL-CCNC: 22 U/L
ANION GAP SERPL CALC-SCNC: 7 MMOL/L (ref 0–18)
AST SERPL-CCNC: 19 U/L (ref ?–34)
BASOPHILS # BLD AUTO: 0.04 X10(3) UL (ref 0–0.2)
BASOPHILS NFR BLD AUTO: 0.7 %
BILIRUB SERPL-MCNC: 0.4 MG/DL (ref 0.3–1.2)
BUN BLD-MCNC: 45 MG/DL (ref 9–23)
C3 SERPL-MCNC: 127.6 MG/DL (ref 90–170)
C4 SERPL-MCNC: 36.9 MG/DL (ref 12–36)
CALCIUM BLD-MCNC: 10.7 MG/DL (ref 8.7–10.4)
CHLORIDE SERPL-SCNC: 109 MMOL/L (ref 98–112)
CHOLEST SERPL-MCNC: 174 MG/DL (ref ?–200)
CO2 SERPL-SCNC: 24 MMOL/L (ref 21–32)
CREAT BLD-MCNC: 1.57 MG/DL
EGFRCR SERPLBLD CKD-EPI 2021: 50 ML/MIN/1.73M2 (ref 60–?)
EOSINOPHIL # BLD AUTO: 0.06 X10(3) UL (ref 0–0.7)
EOSINOPHIL NFR BLD AUTO: 1 %
ERYTHROCYTE [DISTWIDTH] IN BLOOD BY AUTOMATED COUNT: 12.9 %
ERYTHROCYTE [SEDIMENTATION RATE] IN BLOOD: 66 MM/HR
EST. AVERAGE GLUCOSE BLD GHB EST-MCNC: 166 MG/DL (ref 68–126)
FASTING PATIENT LIPID ANSWER: YES
FASTING STATUS PATIENT QL REPORTED: YES
GLOBULIN PLAS-MCNC: 3.2 G/DL (ref 2–3.5)
GLUCOSE BLD-MCNC: 159 MG/DL (ref 70–99)
HBA1C MFR BLD: 7.4 % (ref ?–5.7)
HBV SURFACE AG SER-ACNC: <0.1 [IU]/L
HBV SURFACE AG SERPL QL IA: NONREACTIVE
HCT VFR BLD AUTO: 36.8 %
HDLC SERPL-MCNC: 38 MG/DL (ref 40–59)
HGB BLD-MCNC: 11.7 G/DL
IMM GRANULOCYTES # BLD AUTO: 0.01 X10(3) UL (ref 0–1)
IMM GRANULOCYTES NFR BLD: 0.2 %
LDLC SERPL CALC-MCNC: 97 MG/DL (ref ?–100)
LYMPHOCYTES # BLD AUTO: 1.03 X10(3) UL (ref 1–4)
LYMPHOCYTES NFR BLD AUTO: 17.5 %
MCH RBC QN AUTO: 29.3 PG (ref 26–34)
MCHC RBC AUTO-ENTMCNC: 31.8 G/DL (ref 31–37)
MCV RBC AUTO: 92.2 FL
MONOCYTES # BLD AUTO: 0.33 X10(3) UL (ref 0.1–1)
MONOCYTES NFR BLD AUTO: 5.6 %
NEUTROPHILS # BLD AUTO: 4.42 X10 (3) UL (ref 1.5–7.7)
NEUTROPHILS # BLD AUTO: 4.42 X10(3) UL (ref 1.5–7.7)
NEUTROPHILS NFR BLD AUTO: 75 %
NONHDLC SERPL-MCNC: 136 MG/DL (ref ?–130)
OSMOLALITY SERPL CALC.SUM OF ELEC: 305 MOSM/KG (ref 275–295)
PLATELET # BLD AUTO: 174 10(3)UL (ref 150–450)
POTASSIUM SERPL-SCNC: 5.6 MMOL/L (ref 3.5–5.1)
PROT SERPL-MCNC: 8 G/DL (ref 5.7–8.2)
PSA SERPL-MCNC: 0.78 NG/ML (ref ?–4)
RBC # BLD AUTO: 3.99 X10(6)UL
RHEUMATOID FACT SERPL-ACNC: 13.2 IU/ML (ref ?–14)
SODIUM SERPL-SCNC: 140 MMOL/L (ref 136–145)
TRIGL SERPL-MCNC: 227 MG/DL (ref 30–149)
VIT D+METAB SERPL-MCNC: 23.8 NG/ML (ref 30–100)
VLDLC SERPL CALC-MCNC: 38 MG/DL (ref 0–30)
WBC # BLD AUTO: 5.9 X10(3) UL (ref 4–11)

## 2024-08-24 PROCEDURE — 86334 IMMUNOFIX E-PHORESIS SERUM: CPT | Performed by: INTERNAL MEDICINE

## 2024-08-24 PROCEDURE — 86160 COMPLEMENT ANTIGEN: CPT | Performed by: INTERNAL MEDICINE

## 2024-08-24 PROCEDURE — 86803 HEPATITIS C AB TEST: CPT | Performed by: INTERNAL MEDICINE

## 2024-08-24 PROCEDURE — 87340 HEPATITIS B SURFACE AG IA: CPT | Performed by: INTERNAL MEDICINE

## 2024-08-24 PROCEDURE — 86225 DNA ANTIBODY NATIVE: CPT | Performed by: INTERNAL MEDICINE

## 2024-08-24 PROCEDURE — 85652 RBC SED RATE AUTOMATED: CPT | Performed by: INTERNAL MEDICINE

## 2024-08-24 PROCEDURE — 86431 RHEUMATOID FACTOR QUANT: CPT | Performed by: INTERNAL MEDICINE

## 2024-08-24 PROCEDURE — 83516 IMMUNOASSAY NONANTIBODY: CPT | Performed by: INTERNAL MEDICINE

## 2024-08-24 PROCEDURE — 83521 IG LIGHT CHAINS FREE EACH: CPT | Performed by: INTERNAL MEDICINE

## 2024-08-24 PROCEDURE — 84165 PROTEIN E-PHORESIS SERUM: CPT | Performed by: INTERNAL MEDICINE

## 2024-08-24 PROCEDURE — 86038 ANTINUCLEAR ANTIBODIES: CPT | Performed by: INTERNAL MEDICINE

## 2024-08-26 LAB
DSDNA IGG SERPL IA-ACNC: 0.7 IU/ML
ENA AB SER QL IA: 0.2 UG/L
ENA AB SER QL IA: NEGATIVE

## 2024-08-27 LAB
ALBUMIN SERPL ELPH-MCNC: 4.06 G/DL (ref 3.75–5.21)
ALBUMIN/GLOB SERPL: 1.21 {RATIO} (ref 1–2)
ALPHA1 GLOB SERPL ELPH-MCNC: 0.3 G/DL (ref 0.19–0.46)
ALPHA2 GLOB SERPL ELPH-MCNC: 0.88 G/DL (ref 0.48–1.05)
B-GLOBULIN SERPL ELPH-MCNC: 0.84 G/DL (ref 0.68–1.23)
GAMMA GLOB SERPL ELPH-MCNC: 1.32 G/DL (ref 0.62–1.7)
HCV AB: NON REACTIVE
KAPPA LC FREE SER-MCNC: 6.99 MG/DL (ref 0.33–1.94)
KAPPA LC FREE/LAMBDA FREE SER NEPH: 1.77 {RATIO} (ref 0.26–1.65)
LAMBDA LC FREE SERPL-MCNC: 3.94 MG/DL (ref 0.57–2.63)
PROT SERPL-MCNC: 7.4 G/DL (ref 5.7–8.2)

## 2024-08-30 LAB — ANTI-PLA2R: <1.8 RU/ML

## 2024-09-05 ENCOUNTER — TELEPHONE (OUTPATIENT)
Dept: PODIATRY CLINIC | Facility: CLINIC | Age: 59
End: 2024-09-05

## 2024-09-05 NOTE — TELEPHONE ENCOUNTER
Patient calling stating develop a blister on his toe, requesting to speak to a nurse.Please advise

## 2024-09-05 NOTE — TELEPHONE ENCOUNTER
S/w patient- Patient was seen on 8/8/24 for left 5th toe blister. He states that today he noticed that he had blister on left 4th toe. He denies signs of infection at this time. Patient is using mupirocin ointment and has band-aid. He will go back to wearing boot to offload foot. I booked patient into opening on 9/10/24 at 11:15. He accepted and had no other questions at this time. I did tell patient to go to ER/UC with any worsening symptoms or any signs of infection with the foot.    ELZBIETA

## 2024-09-06 ENCOUNTER — OFFICE VISIT (OUTPATIENT)
Facility: CLINIC | Age: 59
End: 2024-09-06
Payer: COMMERCIAL

## 2024-09-06 ENCOUNTER — OFFICE VISIT (OUTPATIENT)
Dept: PODIATRY CLINIC | Facility: CLINIC | Age: 59
End: 2024-09-06

## 2024-09-06 VITALS
WEIGHT: 240 LBS | DIASTOLIC BLOOD PRESSURE: 74 MMHG | BODY MASS INDEX: 32.51 KG/M2 | HEART RATE: 71 BPM | OXYGEN SATURATION: 96 % | HEIGHT: 72 IN | SYSTOLIC BLOOD PRESSURE: 134 MMHG

## 2024-09-06 DIAGNOSIS — E78.5 HYPERLIPIDEMIA, UNSPECIFIED HYPERLIPIDEMIA TYPE: ICD-10-CM

## 2024-09-06 DIAGNOSIS — E11.42 DIABETIC PERIPHERAL NEUROPATHY (HCC): ICD-10-CM

## 2024-09-06 DIAGNOSIS — E11.69 TYPE 2 DIABETES MELLITUS WITH OTHER SPECIFIED COMPLICATION, WITHOUT LONG-TERM CURRENT USE OF INSULIN (HCC): Primary | ICD-10-CM

## 2024-09-06 DIAGNOSIS — M20.12 HALLUX VALGUS OF LEFT FOOT: ICD-10-CM

## 2024-09-06 DIAGNOSIS — M20.42 HAMMER TOES OF BOTH FEET: ICD-10-CM

## 2024-09-06 DIAGNOSIS — M20.41 HAMMER TOES OF BOTH FEET: ICD-10-CM

## 2024-09-06 DIAGNOSIS — Z98.890 STATUS POST FOOT SURGERY: ICD-10-CM

## 2024-09-06 DIAGNOSIS — L97.522 FOOT ULCER, LEFT, WITH FAT LAYER EXPOSED (HCC): Primary | ICD-10-CM

## 2024-09-06 PROCEDURE — L3260 AMBULATORY SURGICAL BOOT EAC: HCPCS | Performed by: STUDENT IN AN ORGANIZED HEALTH CARE EDUCATION/TRAINING PROGRAM

## 2024-09-06 PROCEDURE — 3051F HG A1C>EQUAL 7.0%<8.0%: CPT | Performed by: STUDENT IN AN ORGANIZED HEALTH CARE EDUCATION/TRAINING PROGRAM

## 2024-09-06 PROCEDURE — 3008F BODY MASS INDEX DOCD: CPT | Performed by: STUDENT IN AN ORGANIZED HEALTH CARE EDUCATION/TRAINING PROGRAM

## 2024-09-06 PROCEDURE — 99214 OFFICE O/P EST MOD 30 MIN: CPT | Performed by: STUDENT IN AN ORGANIZED HEALTH CARE EDUCATION/TRAINING PROGRAM

## 2024-09-06 PROCEDURE — 99213 OFFICE O/P EST LOW 20 MIN: CPT | Performed by: STUDENT IN AN ORGANIZED HEALTH CARE EDUCATION/TRAINING PROGRAM

## 2024-09-06 PROCEDURE — 3060F POS MICROALBUMINURIA REV: CPT | Performed by: STUDENT IN AN ORGANIZED HEALTH CARE EDUCATION/TRAINING PROGRAM

## 2024-09-06 PROCEDURE — 3075F SYST BP GE 130 - 139MM HG: CPT | Performed by: STUDENT IN AN ORGANIZED HEALTH CARE EDUCATION/TRAINING PROGRAM

## 2024-09-06 PROCEDURE — 3078F DIAST BP <80 MM HG: CPT | Performed by: STUDENT IN AN ORGANIZED HEALTH CARE EDUCATION/TRAINING PROGRAM

## 2024-09-06 RX ORDER — BLOOD-GLUCOSE,RECEIVER,CONT
1 EACH MISCELLANEOUS AS DIRECTED
Qty: 1 EACH | Refills: 0 | Status: SHIPPED | OUTPATIENT
Start: 2024-09-06

## 2024-09-06 NOTE — PATIENT INSTRUCTIONS
Return Visit   [ x ] Physician in 6 weeks   [  ] In person or video visit  [  ] In person only    [ x ] After visit summary   [ x ] Placed labs/imaging. Labs are to be drawn at 8A and fasting.    [ x ] Diabetes Education:    [ X ] Carb Aware T2DM (60)      -Follow with Thornton next week and me in 6 weeks  -We will discuss if further medication is needed at that time     Take care!  -Dr. Raymundo

## 2024-09-06 NOTE — PROGRESS NOTES
[facility] Note    Name: Juan James    Date: 9/6/2024    Chief complaint: Diabetes (3mo f/u tran uploaded no concerns /Last A1c 7.4 8/24/24/Foot exam 6/4/24/Eye exam not scheduled )       Subjective:    Initial HPI consult in October 2023  Juan James is a 59 year old male who presents for evaluation of T2DM management. A1C 7.0 12/2023    DM hx:  -Diagnosed with diabetes in 2019  -Re: potential DM medication contraindication: denies history of pancreatitis, personal/fam hx of medullary thyroid ca/MEN2, UTI/yeast infxn, gastroparesis  -Presence of associated DM complications (if positive, checkbox selected):  [] Macrovascular complications (CAD/CVA/PAD)  [] Neuropathy  [] Retinopathy  [x] nephropathy with positive urine microalbumin  [x] HTN  [] Hyperlipidemia    DM meds at first office visit: Farxiga 10 mg daily  Previously trialed/failed DM meds: metformin but was discontinued, unsure why     Interval history  6/4/2024   Tran report uploaded; tran sensor was on and off due to procedures and imaging  Last A1c value was 7% done 6/4/2024.  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 20 tablet 0    Continuous Glucose  (FREESTYLE TRAN 3 READER) Does not apply Misc 1 each As Directed. 1 each 0     hydroCHLOROthiazide 25 MG Oral Tab Take 1 tablet (25 mg total) by mouth daily. 90 tablet 3    Dapagliflozin Propanediol (FARXIGA OR) Take 1 tablet by mouth daily.      amLODIPine 10 MG Oral Tab Take 1 tablet (10 mg total) by mouth daily.      Continuous Blood Gluc Sensor (FREESTYLE NIKHIL 14 DAY SENSOR) Does not apply Misc USE TO CHECK FASTING BLOOD SUGAR IN THE MORNING AND 2 HOURS AFTER EACH MEAL DAILY AS DIRECTED      Meloxicam 15 MG Oral Tab Take 1 tablet (15 mg total) by mouth daily.      valsartan 160 MG Oral Tab Take 2 tablets (320 mg total) by mouth daily.      rosuvastatin 20 MG Oral Tab Take 1 tablet (20 mg total) by mouth nightly.       No Known Allergies  Current Outpatient Medications   Medication Sig Dispense Refill    amoxicillin clavulanate 875-125 MG Oral Tab Take 1 tablet by mouth 2 (two) times daily. 20 tablet 0    Continuous Glucose  (FREESTYLE NIKHIL 3 READER) Does not apply Misc 1 each As Directed. 1 each 0    hydroCHLOROthiazide 25 MG Oral Tab Take 1 tablet (25 mg total) by mouth daily. 90 tablet 3    Dapagliflozin Propanediol (FARXIGA OR) Take 1 tablet by mouth daily.      amLODIPine 10 MG Oral Tab Take 1 tablet (10 mg total) by mouth daily.      Continuous Blood Gluc Sensor (FREESTYLE NIKHIL 14 DAY SENSOR) Does not apply Misc USE TO CHECK FASTING BLOOD SUGAR IN THE MORNING AND 2 HOURS AFTER EACH MEAL DAILY AS DIRECTED      Meloxicam 15 MG Oral Tab Take 1 tablet (15 mg total) by mouth daily.      valsartan 160 MG Oral Tab Take 2 tablets (320 mg total) by mouth daily.      rosuvastatin 20 MG Oral Tab Take 1 tablet (20 mg total) by mouth nightly.       Past Medical History:    Abdominal hernia    Diabetes mellitus (HCC)    Heartburn    High blood pressure    High cholesterol    Neuropathy    feet    Other and unspecified hyperlipidemia    Pain in joints    Renal disorder    Type II or unspecified type diabetes mellitus without mention of complication, not stated as uncontrolled     Unspecified sleep apnea    NO TREATMENT    Visual impairment    glasses    Wears glasses     Past Surgical History:   Procedure Laterality Date    Colonoscopy      Hernia surgery      Other surgical history  06/2023    2nd toe of left foot amputated    Shoulder surg proc unlisted       Social History     Socioeconomic History    Marital status:    Tobacco Use    Smoking status: Former     Types: Cigars, Cigarettes    Smokeless tobacco: Never   Vaping Use    Vaping status: Never Used   Substance and Sexual Activity    Alcohol use: Yes     Alcohol/week: 3.0 standard drinks of alcohol     Types: 3 Cans of beer per week     Comment: social    Drug use: No     Social Determinants of Health      [facility]     History reviewed. No pertinent family history.    ROS/Exam    REVIEW OF SYSTEMS: Ten point review of systems has been performed and is otherwise negative and/or non-contributory, except as described above.     VITALS  Vitals:    09/06/24 1449   BP: 134/74   Pulse: 71   SpO2: 96%   Weight: 240 lb (108.9 kg)   Height: 6' (1.829 m)       PHYSICAL EXAM  CONSTITUTIONAL:  awake, alert, cooperative, no apparent distress, and appears stated age  PSYCH: normal affect  LUNGS: breathing comfortably  CARDIOVASCULAR:  regular rate   ENT:  no palpable thyroid nodules     Labs/Imaging: Pertinent imaging reviewed.    Overall glucose control:  Lab Results   Component Value Date    A1C 7.4 (H) 08/24/2024    A1C 7.0 (A) 06/04/2024    A1C 7.0 (H) 12/29/2023    A1C  10/12/2023      Comment:      Sent to [facility] for testing.        A1C 6.9 (H) 10/12/2023       Assessment & Plan:   No diagnosis found.      Juan James is a pleasant 59 year old male here for evaluation of:    #Diabetes- PMHx of Type 2 diabetes mellitus diagnosed in 2019. Last A1c value was 7.4% done 8/24/2024.  Goal <7%.   Importance of better glucose control in preventing onset/progression of end-organ  damage discussed, as well as goals of therapy and clinical significance of A1C.  Patient has been on Farxiga since 2021;  6/2023 vascular duplex without PAD. Of note, the CANVAS and CANVAS-R trials reported a two-fold increase in the occurrence of lower limb amputations (LLAs) in participants receiving canagliflozin. These were predominantly toe or metatarsal amputations and they occurred in individuals without established peripheral vascular disease prior to study commencement. These findings prompted the [place] Food and Drug Administration (FDA) to issue a caution on the use of canagliflozin in individuals at risk of amputation. Other SGLT2i have not been associated with an increased risk of LLA.   9/6 I have reviewed his mary jane and 15% CGM use with predominantly in range; however last A1c is trending up   - med changes: Continue Farxiga 10 mg daily  - SGLT2i instructions provided re: surgery, times of illness/dehydration    -Patient to see Thornton and follow up with Mary Jo/Kaylene in 6 weeks once there is more data and we can discuss if metformin needs to be added at that time. We also discussed that patient has been sedentary since his surgery so once his lifestyle changes it is likely his A1c will improve. We discussed the importance consistent, low carb diet as well as moderate exercise as well once patient is cleared by podiatry   -Continue to follow with nephrology regarding malb elevation ISO CKD/T2DM   -Follows with podiatry regarding foot exam    -Surveillance for Diabetes Complications & Risks  Foot exam/neuropathy: Last Foot Exam: 06/04/24  -follows with podiatry  Retinopathy screening: No data recordedNo data recorded   -scheduled for June 20, 2024   Nephropathy screening:   Lab Results   Component Value Date    EGFRCR 50 (L) 08/24/2024    MICROALBCREA 3,645.7 (H) 07/15/2024      Blood pressure management:   BP Readings from Last 1 Encounters:   09/06/24 134/74   BP Meds: amLODIPine Tabs - 10 MG;  hydroCHLOROthiazide Tabs - 25 MG; valsartan Tabs - 160 MG   Patient to check blood pressure at home and keep PCP updated if blood pressure remains above goal    Lipids:   Lab Results   Component Value Date    LDL 97 08/24/2024    TRIG 227 (H) 08/24/2024   Cholesterol Meds: rosuvastatin Tabs - 20 MG   Due for repeat lipid panel December 2024       Return in about 3 months (around 12/6/2024).    A total of 30 minutes was spent today on obtaining history, reviewing pertinent labs, evaluating patient, providing multiple treatment options, reinforcing diet/exercise and compliance, and completing documentation.      9/6/2024  Nery Raymundo, DO    Note to patient: The 21 Century Cures Act makes medical notes like these available to patients in the interest of transparency. However, be advised this is a medical document. It is intended as peer to peer communication. It is written in medical language and may contain abbreviations or verbiage that are unfamiliar. It may appear blunt or direct. Medical documents are intended to carry relevant information, facts as evident, and the clinical opinion of the practitioner.

## 2024-09-07 NOTE — PROGRESS NOTES
Juan James is a 59 year old male.   Chief Complaint   Patient presents with    Follow - Up     Left foot ulcer- patient states that it randomly popped up- redness- patient denies pain           HPI:       Patient is a pleasant 59-year-old male with past medical history of diabetes who is a well-known patient of Dr. Felix presents to clinic for evaluation of new sore to his left fourth toe.  He has had history of multiple surgeries with Dr. Felix in the past.  He recently developed ulcer to his fourth toe and is not sure what caused this to arise.  He does notice a little bit of redness to the site.  He denies pain.  He presents today for evaluation.      Allergies: Patient has no known allergies.   Current Outpatient Medications   Medication Sig Dispense Refill    amoxicillin clavulanate 875-125 MG Oral Tab Take 1 tablet by mouth 2 (two) times daily. 20 tablet 0    hydroCHLOROthiazide 25 MG Oral Tab Take 1 tablet (25 mg total) by mouth daily. 90 tablet 3    Dapagliflozin Propanediol (FARXIGA OR) Take 1 tablet by mouth daily.      amLODIPine 10 MG Oral Tab Take 1 tablet (10 mg total) by mouth daily.      Continuous Blood Gluc Sensor (EduRiseSTYLE NIKHIL 14 DAY SENSOR) Does not apply Misc USE TO CHECK FASTING BLOOD SUGAR IN THE MORNING AND 2 HOURS AFTER EACH MEAL DAILY AS DIRECTED      Meloxicam 15 MG Oral Tab Take 1 tablet (15 mg total) by mouth daily.      valsartan 160 MG Oral Tab Take 2 tablets (320 mg total) by mouth daily.      rosuvastatin 20 MG Oral Tab Take 1 tablet (20 mg total) by mouth nightly.      Continuous Glucose  (FREESTYLE NIKHIL 3 READER) Does not apply Misc 1 each As Directed. 1 each 0      Past Medical History:    Abdominal hernia    Diabetes mellitus (HCC)    Heartburn    High blood pressure    High cholesterol    Neuropathy    feet    Other and unspecified hyperlipidemia    Pain in joints    Renal disorder    Type II or unspecified type diabetes mellitus without mention of  complication, not stated as uncontrolled    Unspecified sleep apnea    NO TREATMENT    Visual impairment    glasses    Wears glasses      Past Surgical History:   Procedure Laterality Date    Colonoscopy      Hernia surgery      Other surgical history  06/2023    2nd toe of left foot amputated    Shoulder surg proc unlisted        History reviewed. No pertinent family history.   Social History     Socioeconomic History    Marital status:    Tobacco Use    Smoking status: Former     Types: Cigars, Cigarettes    Smokeless tobacco: Never   Vaping Use    Vaping status: Never Used   Substance and Sexual Activity    Alcohol use: Yes     Alcohol/week: 3.0 standard drinks of alcohol     Types: 3 Cans of beer per week     Comment: social    Drug use: No           REVIEW OF SYSTEMS:     Denies nausea, vomiting, fever, chills.    EXAM:   There were no vitals taken for this visit.  GENERAL: well developed, well nourished, in no apparent distress  EXTREMITIES:       1. Integument: Full-thickness ulcer noted to distal plantar aspect of left fourth toe.  Site measures 0.6 x 0.4 x 0.2 cm.  Base is fibrogranular.  No purulence or other concerns appreciated.   2. Vascular: Patient has palpable pulses on the left   3. Neurologic: Patient has diabetic peripheral   4. Musculoskeletal: Has an adductovarus hammertoe of the fifth digit which mildly underlapped the fourth toe.    ASSESSMENT AND PLAN:   Diagnoses and all orders for this visit:    Foot ulcer, left, with fat layer exposed (HCC)  -     amoxicillin clavulanate 875-125 MG Oral Tab; Take 1 tablet by mouth 2 (two) times daily.    Status post foot surgery    Hammer toes of both feet    Hallux valgus of left foot    Diabetic peripheral neuropathy (HCC)        Plan:   -Patient examined, chart history reviewed.  -Patient does have new ulceration under his left fourth toe that is concerning given his history of amputation and multiple hammertoe surgeries.  -Out of precaution will  prescribe Augmentin that he should take twice daily.  -Should limit activity and ambulate with postop shoe to his left foot.  Postop shoe dispensed in clinic.  -Should dress site with mupirocin ointment daily.  -Educated patient acute signs of infection advised patient to seek immediate medical attention any concerns arise.  -He has appointment with Dr. Felix next week and will follow-up with him at that time.    The patient indicates understanding of these issues and agrees to the plan.    Venkatesh Sullivan DPM

## 2024-09-08 ENCOUNTER — PATIENT MESSAGE (OUTPATIENT)
Facility: CLINIC | Age: 59
End: 2024-09-08

## 2024-09-09 NOTE — TELEPHONE ENCOUNTER
From: Juan James  To: Nery Raymundo  Sent: 9/8/2024 6:02 PM CDT  Subject: Freestyle mary jane monitor     Hi  it appears the monitor for freestyle mary jane has not been processed yet at [facility] on [address] [place] [address] thank you CLARA JAMES 06-

## 2024-09-10 ENCOUNTER — OFFICE VISIT (OUTPATIENT)
Dept: PODIATRY CLINIC | Facility: CLINIC | Age: 59
End: 2024-09-10
Payer: COMMERCIAL

## 2024-09-10 DIAGNOSIS — M20.41 HAMMER TOES OF BOTH FEET: ICD-10-CM

## 2024-09-10 DIAGNOSIS — M20.42 HAMMER TOES OF BOTH FEET: ICD-10-CM

## 2024-09-10 DIAGNOSIS — L97.522 FOOT ULCER, LEFT, WITH FAT LAYER EXPOSED (HCC): Primary | ICD-10-CM

## 2024-09-10 PROCEDURE — 99213 OFFICE O/P EST LOW 20 MIN: CPT | Performed by: PODIATRIST

## 2024-09-12 ENCOUNTER — NURSE ONLY (OUTPATIENT)
Facility: CLINIC | Age: 59
End: 2024-09-12
Payer: COMMERCIAL

## 2024-09-12 ENCOUNTER — PATIENT MESSAGE (OUTPATIENT)
Facility: CLINIC | Age: 59
End: 2024-09-12

## 2024-09-12 DIAGNOSIS — E11.69 TYPE 2 DIABETES MELLITUS WITH OTHER SPECIFIED COMPLICATION, WITHOUT LONG-TERM CURRENT USE OF INSULIN (HCC): Primary | ICD-10-CM

## 2024-09-12 DIAGNOSIS — E11.69 TYPE 2 DIABETES MELLITUS WITH OTHER SPECIFIED COMPLICATION, WITHOUT LONG-TERM CURRENT USE OF INSULIN (HCC): ICD-10-CM

## 2024-09-12 PROCEDURE — 99211 OFF/OP EST MAY X REQ PHY/QHP: CPT | Performed by: STUDENT IN AN ORGANIZED HEALTH CARE EDUCATION/TRAINING PROGRAM

## 2024-09-12 NOTE — PROGRESS NOTES
T2DM Nutrition and Lifestyle Counseling    Juan Devinjeet James  6/13/1965     Referred by: Nery Raymundo DO     Medical Background      Past Medical History:    Abdominal hernia    Diabetes mellitus (HCC)    Heartburn    High blood pressure    High cholesterol    Neuropathy    feet    Other and unspecified hyperlipidemia    Pain in joints    Renal disorder    Type II or unspecified type diabetes mellitus without mention of complication, not stated as uncontrolled    Unspecified sleep apnea    NO TREATMENT    Visual impairment    glasses    Wears glasses      Lab Results   Component Value Date    A1C 7.4 (H) 08/24/2024    A1C 7.0 (A) 06/04/2024    A1C 7.0 (H) 12/29/2023    A1C  10/12/2023      Comment:      Sent to [facility] for testing.        A1C 6.9 (H) 10/12/2023         Patient has T2DM, seen today regarding initial nutrition and lifestyle counseling    Medication Review      DM Meds: FARXIGA ORMeds : Kept the same       Patient States Currently Taking:   - Fraxiga 10 mg daily     Blood Glucose Review      Tran 2, connected to clinic (Tran 3 upgrade ordered 9/11/24)   Patient not consistently scanning to keep up with blood glucose throughout the day:          Patient Interview      Nutrition Intake:    Time Food Notes   5:30 A Whole wheat eng muffin w/ pb and SF jelly, coffee and SF creamer    8 A Small salad, cottage cheese and fruit, fit crunch bar    10:30 A Leftover from dinner - e.g salmon and brussel sprouts, mac and cheese (1/4 box down from 1 whole box), mushrooms, meatballs w/ veggie nooddles, mexican rice)    3 P Snack - chips and salsa or leftover protein from dinner    5:30 P Same as lunch (10:30 A meal)       Typical beverages: water, coffee, sometimes a diet pop  Eating Out? : Yes; comment: rarely      Sleep Patterns:  Goes to sleep at 9PM     Stressors:  - A lot of conflicting input from family, friends and providers about DM management  - wife has cancer (doing well, so this is feeling  less stressful)  - Recent surgery, and another sore on foot     Physical Activity:  - limited due to boot and recovering foot     Education     Nutrition Topics Discussed   - Discussed basic meal planning guidelines for diabetes regular mealtime   - Defined the term macronutrient and helped patient to Identify foods that are carbohydrates, protein, non-starchy vegetables, and fats    - Reinforced the importance of carbohydrate consistency in each meal, and importance of not skipping meals   - Recommended carbohydrate targets of 45-60 grams at meals and 15-30 grams at snacks   - Educated on label reading emphasizing most important areas of focus    - Educated on portion management; including use of measuring cups, plate visualization or food scale   - Provided suggestions for lower carb, higher protein / fiber snacks   - Discussed how to handle special occasions, dining out, and eating on the go   - Educated on emotional eating and being mindful at meals  -  Provided examples of culturally significant and typically eaten foods / food combinations for best real life application of education   - Emphasized the need for small, sustainable changes and working on SMART goals to forge long term behavior change   - Provided instructions on how to use helpful websites or nutrition tracking apps    - If applicable, taught to use carbohydrate to insulin ratio and insulin sensitivity factor     Lifestyle Topics Discussed  - Reinforced the importance of stress management and mental health support  - Discussed how to create SMART goals to help achieve milestones in diabetes care and management over time  - Recommended healthful mental tools to increase motivation for care  - Educated on the importance of intentional movement related to insulin resistance / absorption in the body, and discussed actionable ways to increase movement specific to the patient's interests  - Provided resources to connect patient with the larger diabetes  community to build a greater support network         Goals and Recommendations     - Give yourself credit for the strides you HAVE made  - upgrade to Every1Mobile 3 and start paying attention to how individual foods subjectively effect you  - Continue with the good foundation you've already created     Future Appointments   Date Time Provider Department Center   9/12/2024  9:30 AM [facility] DIABETIC EDUCATOR ENDO [facility]   9/16/2024  3:00 PM Porfirio Felix DPM [id number]   10/16/2024  3:30 PM Brynn Meehan APN [facility]   10/21/2024  3:00 PM Imtiaz Keyes MD EMG[facility]PHNAPER [facility]   12/6/2024  3:00 PM Nery Raymundo DO [id number] [facility]          9/12/2024  Hillary Mcdonald RN, MSN, BC-ADM, [facility]  Diabetes Care &        A total of 60 minutes was spent with the patient including chart review, discussion and education / advisement pertinent to patient and provider specified concerns as documented above.     Note to patient: The 21 Century Cures Act makes medical notes like these available to patients in the interest of transparency. However, be advised this is a medical document. It is intended as peer to peer communication. It is written in medical language and may contain abbreviations or verbiage that are unfamiliar. It may appear blunt or direct. Medical documents are intended to carry relevant information, facts as evident, and the clinical opinion of the practitioner.

## 2024-09-13 ENCOUNTER — TELEPHONE (OUTPATIENT)
Dept: PODIATRY CLINIC | Facility: CLINIC | Age: 59
End: 2024-09-13

## 2024-09-13 NOTE — TELEPHONE ENCOUNTER
Patient returning a missed call due to cancellation appointment for 9/16 I offered patient next opening which is 11/4 patient refused per patient needs something sooner  for a follow up.Please advise

## 2024-09-17 ENCOUNTER — OFFICE VISIT (OUTPATIENT)
Dept: PODIATRY CLINIC | Facility: CLINIC | Age: 59
End: 2024-09-17
Payer: COMMERCIAL

## 2024-09-17 DIAGNOSIS — E11.621 DIABETIC ULCER OF TOE OF LEFT FOOT ASSOCIATED WITH TYPE 2 DIABETES MELLITUS, LIMITED TO BREAKDOWN OF SKIN (HCC): Primary | ICD-10-CM

## 2024-09-17 DIAGNOSIS — L97.521 DIABETIC ULCER OF TOE OF LEFT FOOT ASSOCIATED WITH TYPE 2 DIABETES MELLITUS, LIMITED TO BREAKDOWN OF SKIN (HCC): Primary | ICD-10-CM

## 2024-09-17 PROBLEM — L97.522 FOOT ULCER, LEFT, WITH FAT LAYER EXPOSED (HCC): Status: ACTIVE | Noted: 2024-09-17

## 2024-09-17 PROCEDURE — 99213 OFFICE O/P EST LOW 20 MIN: CPT | Performed by: PODIATRIST

## 2024-09-17 RX ORDER — MUPIROCIN 20 MG/G
OINTMENT TOPICAL
Qty: 30 G | Refills: 0 | Status: SHIPPED | OUTPATIENT
Start: 2024-09-17

## 2024-09-17 RX ORDER — MUPIROCIN 20 MG/G
OINTMENT TOPICAL
Qty: 30 G | Refills: 0 | OUTPATIENT
Start: 2024-09-17

## 2024-09-17 NOTE — PROGRESS NOTES
Juan James is a 59 year old male.   Chief Complaint   Patient presents with    Follow - Up     Left 4th toe follow up - No pain - No drainage         HPI:   Patient returns for checkup on the ulceration of his fourth toe left foot he thinks it looks better he is not getting much drainage.  At today's visit reviewed nurse's history as taken above, allergies medications and medical history as documented below.  All changes duly noted  Allergies: Patient has no known allergies.   Current Outpatient Medications   Medication Sig Dispense Refill    Continuous Glucose Sensor (FREESTYLE NIKHIL 3 PLUS SENSOR) Does not apply Misc 1 each As Directed. One sensor every 15 days. 30 day supply. 2 each 5    amoxicillin clavulanate 875-125 MG Oral Tab Take 1 tablet by mouth 2 (two) times daily. 20 tablet 0    Continuous Glucose  (FREESTYLE NIKHIL 3 READER) Does not apply Misc 1 each As Directed. 1 each 0    hydroCHLOROthiazide 25 MG Oral Tab Take 1 tablet (25 mg total) by mouth daily. 90 tablet 3    Dapagliflozin Propanediol (FARXIGA OR) Take 1 tablet by mouth daily.      amLODIPine 10 MG Oral Tab Take 1 tablet (10 mg total) by mouth daily.      Continuous Blood Gluc Sensor (FREESTYLE NIKHIL 14 DAY SENSOR) Does not apply Misc USE TO CHECK FASTING BLOOD SUGAR IN THE MORNING AND 2 HOURS AFTER EACH MEAL DAILY AS DIRECTED      Meloxicam 15 MG Oral Tab Take 1 tablet (15 mg total) by mouth daily.      valsartan 160 MG Oral Tab Take 2 tablets (320 mg total) by mouth daily.      rosuvastatin 20 MG Oral Tab Take 1 tablet (20 mg total) by mouth nightly.        Past Medical History:    Abdominal hernia    Diabetes mellitus (HCC)    Heartburn    High blood pressure    High cholesterol    Neuropathy    feet    Other and unspecified hyperlipidemia    Pain in joints    Renal disorder    Type II or unspecified type diabetes mellitus without mention of complication, not stated as uncontrolled    Unspecified sleep apnea    NO  TREATMENT    Visual impairment    glasses    Wears glasses      Past Surgical History:   Procedure Laterality Date    Colonoscopy      Hernia surgery      Other surgical history  06/2023    2nd toe of left foot amputated    Shoulder surg proc unlisted        History reviewed. No pertinent family history.   Social History     Socioeconomic History    Marital status:    Tobacco Use    Smoking status: Former     Types: Cigars, Cigarettes    Smokeless tobacco: Never   Vaping Use    Vaping status: Never Used   Substance and Sexual Activity    Alcohol use: Yes     Alcohol/week: 3.0 standard drinks of alcohol     Types: 3 Cans of beer per week     Comment: social    Drug use: No           REVIEW OF SYSTEMS:   Today reviewed systens as documented below  GENERAL HEALTH: feels well otherwise  SKIN: Refer to exam below  RESPIRATORY: denies shortness of breath with exertion  CARDIOVASCULAR: denies chest pain on exertion  GI: denies abdominal pain and denies heartburn  NEURO: denies headaches    EXAM:   There were no vitals taken for this visit.  GENERAL: well developed, well nourished, in no apparent distress  EXTREMITIES:   1. Integument: The skin around the ulceration shows no erythema no edema no sign of infection there is no undermining.  No sign of deep infection on the fourth toe left foot.  The ulceration itself is much smaller it is more superficial.  There is only loosened blister around the periphery.  That was trimmed using a 15 blade   2. Vascular: Patient has palpable pulse   3. Neurologic: Has diminished pedal   4. Musculoskeletal: Has a stable first MTP joint fusion medial deviation of the third toe.  Toe with a partial amputation and now diabetic ulcer to the plantar tip.  No sign of infection.    ASSESSMENT AND PLAN:   Diagnoses and all orders for this visit:    Diabetic ulcer of toe of left foot associated with type 2 diabetes mellitus, limited to breakdown of skin (HCC)        Plan: Will continue with  local wound care using mupirocin ointment patient told to stay off the foot continue with his diabetic shoes and restricted activities.  I will follow-up with him again in 2 weeks but sooner if needed.  If he notices any signs of infection he should present to either urgent care or emergency and have us paged.    The patient indicates understanding of these issues and agrees to the plan.    Porfirio Felix DPM

## 2024-09-19 RX ORDER — BLOOD-GLUCOSE SENSOR
1 EACH MISCELLANEOUS AS DIRECTED
Qty: 2 EACH | Refills: 5 | Status: SHIPPED | OUTPATIENT
Start: 2024-09-19

## 2024-10-01 ENCOUNTER — OFFICE VISIT (OUTPATIENT)
Dept: PODIATRY CLINIC | Facility: CLINIC | Age: 59
End: 2024-10-01
Payer: COMMERCIAL

## 2024-10-01 DIAGNOSIS — L97.521 DIABETIC ULCER OF TOE OF LEFT FOOT ASSOCIATED WITH TYPE 2 DIABETES MELLITUS, LIMITED TO BREAKDOWN OF SKIN (HCC): Primary | ICD-10-CM

## 2024-10-01 DIAGNOSIS — E11.621 DIABETIC ULCER OF TOE OF LEFT FOOT ASSOCIATED WITH TYPE 2 DIABETES MELLITUS, LIMITED TO BREAKDOWN OF SKIN (HCC): Primary | ICD-10-CM

## 2024-10-01 PROCEDURE — 99213 OFFICE O/P EST LOW 20 MIN: CPT | Performed by: PODIATRIST

## 2024-10-05 NOTE — PROGRESS NOTES
Juan James is a 59 year old male.   Chief Complaint   Patient presents with    Diabetic Ulcer     L 4th digit f/u -  Pt is healing well. Pt states callus R hallux, no pain. [id number]         HPI:   Patient returns for follow-up on his left fourth toe says it is doing well he also has a callus on his right great toe.  At today's visit reviewed nurse's history as taken above, allergies medications and medical history as documented below.  All changes duly noted  Allergies: Patient has no known allergies.   Current Outpatient Medications   Medication Sig Dispense Refill    Continuous Glucose Sensor (FREESTYLE NIKHIL 3 PLUS SENSOR) Does not apply Misc 1 each As Directed. One sensor every 15 days. 30 day supply. 2 each 5    mupirocin 2 % External Ointment Apply a small amount to the ulcerated site on the fourth toe once daily after bathing and cover with a Band-Aid 30 g 0    amoxicillin clavulanate 875-125 MG Oral Tab Take 1 tablet by mouth 2 (two) times daily. 20 tablet 0    Continuous Glucose  (FREESTYLE NIKHIL 3 READER) Does not apply Misc 1 each As Directed. 1 each 0    hydroCHLOROthiazide 25 MG Oral Tab Take 1 tablet (25 mg total) by mouth daily. 90 tablet 3    Dapagliflozin Propanediol (FARXIGA OR) Take 1 tablet by mouth daily.      amLODIPine 10 MG Oral Tab Take 1 tablet (10 mg total) by mouth daily.      Continuous Blood Gluc Sensor (FREESTYLE NIKHIL 14 DAY SENSOR) Does not apply Misc USE TO CHECK FASTING BLOOD SUGAR IN THE MORNING AND 2 HOURS AFTER EACH MEAL DAILY AS DIRECTED      Meloxicam 15 MG Oral Tab Take 1 tablet (15 mg total) by mouth daily.      valsartan 160 MG Oral Tab Take 2 tablets (320 mg total) by mouth daily.      rosuvastatin 20 MG Oral Tab Take 1 tablet (20 mg total) by mouth nightly.        Past Medical History:    Abdominal hernia    Diabetes mellitus (HCC)    Heartburn    High blood pressure    High cholesterol    Neuropathy    feet    Other and unspecified hyperlipidemia     Pain in joints    Renal disorder    Type II or unspecified type diabetes mellitus without mention of complication, not stated as uncontrolled    Unspecified sleep apnea    NO TREATMENT    Visual impairment    glasses    Wears glasses      Past Surgical History:   Procedure Laterality Date    Colonoscopy      Hernia surgery      Other surgical history  06/2023    2nd toe of left foot amputated    Shoulder surg proc unlisted        No family history on file.   Social History     Socioeconomic History    Marital status:    Tobacco Use    Smoking status: Former     Types: Cigars, Cigarettes    Smokeless tobacco: Never   Vaping Use    Vaping status: Never Used   Substance and Sexual Activity    Alcohol use: Yes     Alcohol/week: 3.0 standard drinks of alcohol     Types: 3 Cans of beer per week     Comment: social    Drug use: No           REVIEW OF SYSTEMS:   Today reviewed systens as documented below  GENERAL HEALTH: feels well otherwise  SKIN: Refer to exam below  RESPIRATORY: denies shortness of breath with exertion  CARDIOVASCULAR: denies chest pain on exertion  GI: denies abdominal pain and denies heartburn  NEURO: denies headaches    EXAM:   There were no vitals taken for this visit.  GENERAL: well developed, well nourished, in no apparent distress  EXTREMITIES:   1. Integument: Skin on both feet was evaluated right foot has no ulcerations or surgical scars from previous surgeries on the left the ulceration to the tip of the fourth toe is healing uneventfully it is almost completely epithelialized at this time on the left foot.  There is hyperkeratosis medial plantar aspect of his hallux very distal medial tip   2. Vascular: Has palpable pulses   3. Neurologic: Has diminished pedal   4. Musculoskeletal: Has good muscle strength he is ambulatory.  All surgical sites have healed well    ASSESSMENT AND PLAN:   Diagnoses and all orders for this visit:    Diabetic ulcer of toe of left foot associated with type 2  diabetes mellitus, limited to breakdown of skin (HCC)        Plan: Trimmed down debrided hyperkeratoses gave him separator for the first and second toes.  Follow-up again in about 3 weeks with physical restrictions.  No sign of infection at this visit    The patient indicates understanding of these issues and agrees to the plan.    LISANDRO GarciaM

## 2024-10-14 ENCOUNTER — OFFICE VISIT (OUTPATIENT)
Dept: PODIATRY CLINIC | Facility: CLINIC | Age: 59
End: 2024-10-14
Payer: COMMERCIAL

## 2024-10-14 DIAGNOSIS — E11.621 DIABETIC ULCER OF TOE OF LEFT FOOT ASSOCIATED WITH TYPE 2 DIABETES MELLITUS, LIMITED TO BREAKDOWN OF SKIN (HCC): Primary | ICD-10-CM

## 2024-10-14 DIAGNOSIS — M20.42 HAMMER TOES OF BOTH FEET: ICD-10-CM

## 2024-10-14 DIAGNOSIS — M20.41 HAMMER TOES OF BOTH FEET: ICD-10-CM

## 2024-10-14 DIAGNOSIS — L97.521 DIABETIC ULCER OF TOE OF LEFT FOOT ASSOCIATED WITH TYPE 2 DIABETES MELLITUS, LIMITED TO BREAKDOWN OF SKIN (HCC): Primary | ICD-10-CM

## 2024-10-14 DIAGNOSIS — L97.522 FOOT ULCER, LEFT, WITH FAT LAYER EXPOSED (HCC): ICD-10-CM

## 2024-10-14 PROCEDURE — 99213 OFFICE O/P EST LOW 20 MIN: CPT | Performed by: PODIATRIST

## 2024-10-16 ENCOUNTER — TELEPHONE (OUTPATIENT)
Facility: CLINIC | Age: 59
End: 2024-10-16

## 2024-10-16 ENCOUNTER — OFFICE VISIT (OUTPATIENT)
Facility: CLINIC | Age: 59
End: 2024-10-16
Payer: COMMERCIAL

## 2024-10-16 VITALS
SYSTOLIC BLOOD PRESSURE: 138 MMHG | OXYGEN SATURATION: 98 % | DIASTOLIC BLOOD PRESSURE: 82 MMHG | HEART RATE: 78 BPM | BODY MASS INDEX: 31.56 KG/M2 | HEIGHT: 72 IN | WEIGHT: 233 LBS

## 2024-10-16 DIAGNOSIS — I15.2 HYPERTENSION ASSOCIATED WITH TYPE 2 DIABETES MELLITUS (HCC): ICD-10-CM

## 2024-10-16 DIAGNOSIS — L97.509 TYPE 2 DIABETES MELLITUS WITH FOOT ULCER, WITHOUT LONG-TERM CURRENT USE OF INSULIN (HCC): Primary | ICD-10-CM

## 2024-10-16 DIAGNOSIS — N18.31 STAGE 3A CHRONIC KIDNEY DISEASE (HCC): ICD-10-CM

## 2024-10-16 DIAGNOSIS — E11.621 TYPE 2 DIABETES MELLITUS WITH FOOT ULCER, WITHOUT LONG-TERM CURRENT USE OF INSULIN (HCC): Primary | ICD-10-CM

## 2024-10-16 DIAGNOSIS — E11.69 HYPERLIPIDEMIA ASSOCIATED WITH TYPE 2 DIABETES MELLITUS (HCC): ICD-10-CM

## 2024-10-16 DIAGNOSIS — E11.59 HYPERTENSION ASSOCIATED WITH TYPE 2 DIABETES MELLITUS (HCC): ICD-10-CM

## 2024-10-16 DIAGNOSIS — E78.5 HYPERLIPIDEMIA ASSOCIATED WITH TYPE 2 DIABETES MELLITUS (HCC): ICD-10-CM

## 2024-10-16 DIAGNOSIS — R80.9 ALBUMINURIA: ICD-10-CM

## 2024-10-16 LAB — HEMOGLOBIN A1C: 6.4 % (ref 4.3–5.6)

## 2024-10-16 PROCEDURE — 83036 HEMOGLOBIN GLYCOSYLATED A1C: CPT

## 2024-10-16 PROCEDURE — 3044F HG A1C LEVEL LT 7.0%: CPT

## 2024-10-16 PROCEDURE — 99215 OFFICE O/P EST HI 40 MIN: CPT

## 2024-10-16 PROCEDURE — 3075F SYST BP GE 130 - 139MM HG: CPT

## 2024-10-16 PROCEDURE — 3079F DIAST BP 80-89 MM HG: CPT

## 2024-10-16 PROCEDURE — 3008F BODY MASS INDEX DOCD: CPT

## 2024-10-16 NOTE — PROGRESS NOTES
[facility] Note    Name: Juan James    Date: 10/16/2024    Chief complaint: Follow - Up (Pt presents for DM follow up. )       Subjective:    Initial HPI consult in October 2023  Juan James is a 59 year old male who presents for evaluation of T2DM management. A1C 7.0 12/2023    DM hx:  -Diagnosed with diabetes in 2019  -Re: potential DM medication contraindication: denies history of pancreatitis, personal/fam hx of medullary thyroid ca/MEN2, UTI/yeast infxn, gastroparesis  -Presence of associated DM complications (if positive, checkbox selected):  [] Macrovascular complications (CAD/CVA/PAD)  [] Neuropathy  [] Retinopathy  [x] nephropathy with positive urine microalbumin  [x] HTN  [] Hyperlipidemia    DM meds at first office visit: Farxiga 10 mg daily  Previously trialed/failed DM meds: metformin but was discontinued, unsure why     Interval history  10/16/2024-dulce maria/ Kaylene KELLEY.Last A1c value was 6.4% done 10/16/2024.  Last office visit, continued with current dose of farxiga. He is hoping to get more education on his diabetes  His wound is stable, continues to follow with Dr. Felix   DM meds at visit: farxiga 10mg daily     Continuous Glucose Monitoring Interpretation  Juan James has undergone continuous glucose monitoring with their CGM.  The blood glucose tracings were evaluated for two weeks prior to office visit.   Blood glucose tracings demonstrated no significant hyperglycemia There were no areas of hypoglycemia noted.          History/Other:    Allergies, PMH, SocHx and FHx reviewed and updated as appropriate in Epic on    PEG 3350-KCl-Na Bicarb-NaCl 420 g Oral Recon Soln Take as directed by physician. 4000 mL 0    Continuous Glucose Sensor (FREESTYLE NIKHIL 3 PLUS SENSOR) Does not apply Misc 1 each As Directed. One sensor every 15 days. 30 day supply. 2 each 5    mupirocin 2 % External Ointment Apply a small amount to the ulcerated site on the fourth toe  once daily after bathing and cover with a Band-Aid 30 g 0    amoxicillin clavulanate 875-125 MG Oral Tab Take 1 tablet by mouth 2 (two) times daily. 20 tablet 0    Continuous Glucose  (FREESTYLE NIKHIL 3 READER) Does not apply Misc 1 each As Directed. 1 each 0    hydroCHLOROthiazide 25 MG Oral Tab Take 1 tablet (25 mg total) by mouth daily. 90 tablet 3    Dapagliflozin Propanediol (FARXIGA OR) Take 1 tablet by mouth daily.      amLODIPine 10 MG Oral Tab Take 1 tablet (10 mg total) by mouth daily.      Continuous Blood Gluc Sensor (FREESTYLE NIKHIL 14 DAY SENSOR) Does not apply Misc USE TO CHECK FASTING BLOOD SUGAR IN THE MORNING AND 2 HOURS AFTER EACH MEAL DAILY AS DIRECTED      Meloxicam 15 MG Oral Tab Take 1 tablet (15 mg total) by mouth daily.      valsartan 160 MG Oral Tab Take 2 tablets (320 mg total) by mouth daily.      rosuvastatin 20 MG Oral Tab Take 1 tablet (20 mg total) by mouth nightly.       No Known Allergies  Current Outpatient Medications   Medication Sig Dispense Refill    PEG 3350-KCl-Na Bicarb-NaCl 420 g Oral Recon Soln Take as directed by physician. 4000 mL 0    Continuous Glucose Sensor (FREESTYLE NIKHIL 3 PLUS SENSOR) Does not apply Misc 1 each As Directed. One sensor every 15 days. 30 day supply. 2 each 5    mupirocin 2 % External Ointment Apply a small amount to the ulcerated site on the fourth toe once daily after bathing and cover with a Band-Aid 30 g 0    amoxicillin clavulanate 875-125 MG Oral Tab Take 1 tablet by mouth 2 (two) times daily. 20 tablet 0    Continuous Glucose  (FREESTYLE NIKHIL 3 READER) Does not apply Misc 1 each As Directed. 1 each 0    hydroCHLOROthiazide 25 MG Oral Tab Take 1 tablet (25 mg total) by mouth daily. 90 tablet 3    Dapagliflozin Propanediol (FARXIGA OR) Take 1 tablet by mouth daily.      amLODIPine 10 MG Oral Tab Take 1 tablet (10 mg total) by mouth daily.      Continuous Blood Gluc Sensor (FREESTYLE NIKHIL 14 DAY SENSOR) Does not apply Misc USE  TO CHECK FASTING BLOOD SUGAR IN THE MORNING AND 2 HOURS AFTER EACH MEAL DAILY AS DIRECTED      Meloxicam 15 MG Oral Tab Take 1 tablet (15 mg total) by mouth daily.      valsartan 160 MG Oral Tab Take 2 tablets (320 mg total) by mouth daily.      rosuvastatin 20 MG Oral Tab Take 1 tablet (20 mg total) by mouth nightly.       Past Medical History:    Abdominal hernia    Diabetes mellitus (HCC)    Heartburn    High blood pressure    High cholesterol    Neuropathy    feet    Other and unspecified hyperlipidemia    Pain in joints    Renal disorder    Type II or unspecified type diabetes mellitus without mention of complication, not stated as uncontrolled    Unspecified sleep apnea    NO TREATMENT    Visual impairment    glasses    Wears glasses     Past Surgical History:   Procedure Laterality Date    Colonoscopy      Hernia surgery      Other surgical history  06/2023    2nd toe of left foot amputated    Shoulder surg proc unlisted       Social History     Socioeconomic History    Marital status:    Tobacco Use    Smoking status: Former     Types: Cigars, Cigarettes    Smokeless tobacco: Never   Vaping Use    Vaping status: Never Used   Substance and Sexual Activity    Alcohol use: Yes     Alcohol/week: 3.0 standard drinks of alcohol     Types: 3 Cans of beer per week     Comment: social    Drug use: No     Social Drivers of Health      [facility]     History reviewed. No pertinent family history.    ROS/Exam    REVIEW OF SYSTEMS: Ten point review of systems has been performed and is otherwise negative and/or non-contributory, except as described above.     VITALS  Vitals:    10/16/24 1513   BP: 138/82   BP Location: Right arm   Patient Position: Sitting   Cuff Size: adult   Pulse: 78   SpO2: 98%   Weight: 233 lb (105.7 kg)   Height: 6' (1.829 m)       PHYSICAL EXAM  CONSTITUTIONAL:  awake, alert, cooperative, no apparent distress, and appears stated age  PSYCH: normal  affect  LUNGS: breathing comfortably  CARDIOVASCULAR:  regular rate   ENT:  no palpable thyroid nodules     Labs/Imaging: Pertinent imaging reviewed.    Overall glucose control:  Lab Results   Component Value Date    A1C 6.4 (A) 10/16/2024    A1C 7.4 (H) 08/24/2024    A1C 7.0 (A) 06/04/2024    A1C 7.0 (H) 12/29/2023    A1C  10/12/2023      Comment:      Sent to [facility] for testing.        A1C 6.9 (H) 10/12/2023       Assessment & Plan:     ICD-10-CM    1. Type 2 diabetes mellitus with foot ulcer, without long-term current use of insulin ([facility])  E11.621 POC Hemoglobin A1C    L97.509 EDUCATION -OP REFERRAL  DIABETES- FULL ED 10 HRS GRP/IND      2. Albuminuria  R80.9       3. Hypertension associated with type 2 diabetes mellitus ([facility])  E11.59     I15.2       4. Hyperlipidemia associated with type 2 diabetes mellitus ([facility])  E11.69     E78.5       5. Stage 3a chronic kidney disease ([facility])  N18.31             Juan James is a pleasant 59 year old male here for evaluation of:    #Diabetes- PMHx of Type 2 diabetes mellitus diagnosed in 2019. Last A1c value was 7.4% done 8/24/2024 -->  trended down to Last A1c value was 6.4% done 10/16/2024.  Goal <7%.     Patient has been on Farxiga since 2021;  6/2023 vascular duplex without PAD. Of note, the CANVAS and CANVAS-R trials reported a two-fold increase in the occurrence of lower limb amputations (LLAs) in participants receiving canagliflozin. These were predominantly toe or metatarsal amputations and they occurred in individuals without established peripheral vascular disease prior to study commencement. These findings prompted the [place] Food and Drug Administration (FDA) to issue a caution on the use of canagliflozin in individuals at risk of amputation. Other SGLT2i have not been associated with an increased risk of LLA.     MUCH improvement with patient's dietary changes and meeting with Thornton (CDE) for lifestyle changes. Tran 3 also helping patient  monitor his BG control.  He would like more teaching/support so recommended DM center education classes (5 classes in group setting). We reviewed his current dietary intake, encouraged incr'd fiber, protein and healthy fat intake.     - Continue Farxiga 10 mg daily  - SGLT2i instructions provided re: surgery, times of illness/dehydration    -no strict CI For metformin if needed; patient prefers to avoid/wants few medication as possible  -Importance of better glucose control in preventing onset/progression of end-organ damage discussed, as well as goals of therapy and clinical significance of A1C.     -Surveillance for Diabetes Complications & Risks  Foot exam/neuropathy: Last Foot Exam: 06/04/24  -active wound, follows with podiatry    Retinopathy screening: No data recordedNo data recorded   -scheduled for June 20, 2024     #CKD stage 3a   #Albuminuria  - taking ARB   -Continue to follow with nephrology regarding malb elevation ISO CKD/T2DM   Lab Results   Component Value Date    EGFRCR 50 (L) 08/24/2024    MICROALBCREA 3,645.7 (H) 07/15/2024      #Hypertension  - meds as below  - Patient to check blood pressure at home and keep PCP updated if blood pressure remains above goal  BP Readings from Last 1 Encounters:   10/16/24 138/82   BP Meds: amLODIPine Tabs - 10 MG; hydroCHLOROthiazide Tabs - 25 MG; valsartan Tabs - 160 MG     #Hyperlipidemia  Lab Results   Component Value Date    LDL 97 08/24/2024    TRIG 227 (H) 08/24/2024   Cholesterol Meds: rosuvastatin Tabs - 20 MG   Due for repeat lipid panel December 2024       Return in about 3 months (around 1/16/2025) for scheduled follow up with Dr. Raymundo.    A total of 45 minutes was spent today on obtaining history, reviewing pertinent labs, evaluating patient, providing multiple treatment options, reinforcing diet/exercise and compliance, and completing documentation.      ALLIE Auguste  Endocrinology, Diabetes & Metabolism   10/16/2024    Note to patient:  The 21 Century Cures Act makes medical notes like these available to patients in the interest of transparency. However, be advised this is a medical document. It is intended as peer to peer communication. It is written in medical language and may contain abbreviations or verbiage that are unfamiliar. It may appear blunt or direct. Medical documents are intended to carry relevant information, facts as evident, and the clinical opinion of the practitioner.

## 2024-10-16 NOTE — PATIENT INSTRUCTIONS
Follow up plan:  Return in about 3 months (around 1/16/2025) for scheduled follow up with Dr. Raymundo.    - Diabetes education referral placed -- you can complete the \"step classes\"     Lab Results   Component Value Date    A1C 6.4 (A) 10/16/2024    A1C 7.4 (H) 08/24/2024    A1C 7.0 (A) 06/04/2024    A1C 7.0 (H) 12/29/2023    A1C  10/12/2023      Comment:      [facility].        A1C 6.9 (H) 10/12/2023        Updated/current diabetes medication instructions:  Diabetic Medications               Dapagliflozin Propanediol (FARXIGA OR) (Taking) Take 1 tablet by mouth daily.          - Please review the following if you are taking JARDIANCE OR FARXIGA:  This medication will remove extra sugar out of blood into your urine. It can be dosed anytime of day, but morning is probably best time to take it as it can cause increased urination. Please drink at least 4 glasses of water daily to avoid dehydration. It does not cause low blood sugars (hypoglycemia). If you develop any low blood sugars, we will need to adjust other medications. Please call me if you develop any symptoms of urinary tract infection (burning with urination) or yeast infection (new rash itching or burning in the genital area).     While you are taking this medication please be mindful of sick day protocol (for illness, if you are vomiting, excessively exercising/sweating, or alcohol intake) to avoid dehydration:   -Temporarily hold the medication if you are in a dehydrated state. You can call the clinic for further instruction if you are sick and can't remember which diabetes medication to hold.  -Check blood sugar levels more often while sick  -Seek medical help early if you develop any abdominal pain, nausea or vomiting.  -Okay to resume this medication once you are eating /drinking regularly and no longer dehydrated.    Surgery protocol:  If you are having surgery please contact the clinic as we will need to adjust your diabetes  medications. This medication needs to be held at least 4 days prior to your procedure.. Once eating/drinking normally after surgery, ok to resume (this avoids dehydration).   Office phone number: [phone]; phones are open Monday-Friday 8:30-4:30.   Thank you for visiting our office. We look forward to working with you to reach your health goals. As a reminder, if you need refills, please request early so there is enough time to process the request. We ask that you provide at least 5 days' notice before a refill is due, so there is time to send a request to pharmacy, process the refill, and ensure there are no other problems with obtaining the medication (backorders, prior authorization paperwork, etc). Routine refills will not be addressed on weekends, so please submit these requests during the week.    Blood sugar targets:  Before breakfast: , 2 hours after meals: <180 (preferably <150), A1C goal: <7.0%  Time in Range goal is higher than 80% if using a continuous glucose monitor (Dexcom or Tran).  Time in Range can be found within the Dexcom G7 christa, on Clarity if using Dexcom G6, or on LibreView if using Tran.    HOW TO TREAT LOW BLOOD SUGAR (Hypoglycemia)  Low blood sugar= Less than 70, or if you start to have symptoms (below)  Symptoms: Shaking or trembling, fast heart rate, extreme hunger, sweating, confusion/difficulty concentrating, dizziness.    How to treat a low blood sugar if you are able to eat/drink: The Rule of 15  If you are using continuous glucose monitor that says you are low, but you do not have any symptoms, verify on fingerstick that your blood sugar is actually low before treating.   Eat 15 grams of carbs (see examples below)  Check your blood sugar after 15 minutes. If it’s still below your target range, have another serving.   Repeat these steps until it’s in your target range. Once it’s in range, if you're nervous about your sugar going low again, have a protein source (ie, a  spoonful of peanut butter).   If you have a CGM you want to look for how your arrow has changed. If you arrow is pointed up or sideways after 15 min, give your CGM more time OR check with a finger stick. Try not to eat more food until at least 15 min after the first BG check - otherwise you risk having a rebound high.  If you are experiencing symptoms and you are unable to check your blood glucose for any reason, treat the hypoglycemia.  If someone has a low blood sugar and is unconscious: Don’t hesitate to call 911. If someone is unconscious and glucagon is not available or someone does not know how to use it, call 911 immediately.    To treat a low, I recommend you carry with you easy, pre-portioned treatment for low blood sugars that are 15G of carbs:   - Children sized squeeze pouch applesauce (high fiber + carbs help prevent too high of a spike)  - Small children's sized juicebox- 15g carb --> 4oz juice box  - Glucose tablets from [employer], you can find them near diabetes supplies --> Note, you will need to eat 3-4 tablets to get to 15g of carbs  - Children sized fruit snack pack- look for one with 15 grams of total carbohydrate  - Choice of how to treat your low is important. Complex carbs, or foods that contain fats along with carbs (like chocolate) can slow the absorption of glucose and should NOT be used to treat an emergency low

## 2024-10-16 NOTE — TELEPHONE ENCOUNTER
MA second attempt to receive Eye exam- [facility] eye care.   [phone]  Awaiting to receive eye exam once receive will update flowsheet.

## 2024-10-17 ENCOUNTER — MED REC SCAN ONLY (OUTPATIENT)
Facility: CLINIC | Age: 59
End: 2024-10-17

## 2024-10-26 NOTE — PROGRESS NOTES
Juan James is a 59 year old male.   Chief Complaint   Patient presents with    Follow - Up     Left 4th digit- patient denies pain          HPI:   Patient returns to clinic for checkup on the fourth toe of his left foot.  Again he is not having any pain.  At today's visit reviewed nurse's history as taken above, allergies medications and medical history as documented below.  All changes duly noted  Allergies: Patient has no known allergies.   Current Outpatient Medications   Medication Sig Dispense Refill    PEG 3350-KCl-Na Bicarb-NaCl 420 g Oral Recon Soln Take as directed by physician. 4000 mL 0    Continuous Glucose Sensor (FREESTYLE NIKHIL 3 PLUS SENSOR) Does not apply Misc 1 each As Directed. One sensor every 15 days. 30 day supply. 2 each 5    mupirocin 2 % External Ointment Apply a small amount to the ulcerated site on the fourth toe once daily after bathing and cover with a Band-Aid 30 g 0    amoxicillin clavulanate 875-125 MG Oral Tab Take 1 tablet by mouth 2 (two) times daily. 20 tablet 0    Continuous Glucose  (FREESTYLE NIKHIL 3 READER) Does not apply Misc 1 each As Directed. 1 each 0    hydroCHLOROthiazide 25 MG Oral Tab Take 1 tablet (25 mg total) by mouth daily. 90 tablet 3    Dapagliflozin Propanediol (FARXIGA OR) Take 1 tablet by mouth daily.      amLODIPine 10 MG Oral Tab Take 1 tablet (10 mg total) by mouth daily.      Continuous Blood Gluc Sensor (FREESTYLE NIKHIL 14 DAY SENSOR) Does not apply Misc USE TO CHECK FASTING BLOOD SUGAR IN THE MORNING AND 2 HOURS AFTER EACH MEAL DAILY AS DIRECTED      Meloxicam 15 MG Oral Tab Take 1 tablet (15 mg total) by mouth daily.      valsartan 160 MG Oral Tab Take 2 tablets (320 mg total) by mouth daily.      rosuvastatin 20 MG Oral Tab Take 1 tablet (20 mg total) by mouth nightly.        Past Medical History:    Abdominal hernia    Diabetes mellitus (HCC)    Heartburn    High blood pressure    High cholesterol    Neuropathy    feet    Other  and unspecified hyperlipidemia    Pain in joints    Renal disorder    Type II or unspecified type diabetes mellitus without mention of complication, not stated as uncontrolled    Unspecified sleep apnea    NO TREATMENT    Visual impairment    glasses    Wears glasses      Past Surgical History:   Procedure Laterality Date    Colonoscopy      Hernia surgery      Other surgical history  06/2023    2nd toe of left foot amputated    Shoulder surg proc unlisted        History reviewed. No pertinent family history.   Social History     Socioeconomic History    Marital status:    Tobacco Use    Smoking status: Former     Types: Cigars, Cigarettes    Smokeless tobacco: Never   Vaping Use    Vaping status: Never Used   Substance and Sexual Activity    Alcohol use: Yes     Alcohol/week: 3.0 standard drinks of alcohol     Types: 3 Cans of beer per week     Comment: social    Drug use: No           REVIEW OF SYSTEMS:   Today reviewed systens as documented below  GENERAL HEALTH: feels well otherwise  SKIN: Refer to exam below  RESPIRATORY: denies shortness of breath with exertion  CARDIOVASCULAR: denies chest pain on exertion  GI: denies abdominal pain and denies heartburn  NEURO: denies headaches    EXAM:   There were no vitals taken for this visit.  GENERAL: well developed, well nourished, in no apparent distress  EXTREMITIES:   1. Integument: The skin on the fourth toe shows it is healing lesion is smaller and more superficial it no longer has a granular base indicating it has an epithelial layer over it   2. Vascular: Has palpable pulses   3. Neurologic: Patient has diminished pedal sensation loss of protective sensation and diabetic neuropathy   4. Musculoskeletal: Patient has good muscle strength he is ambulatory currently using a cart at work which I recommended he continue    ASSESSMENT AND PLAN:   Diagnoses and all orders for this visit:    Diabetic ulcer of toe of left foot associated with type 2 diabetes  mellitus, limited to breakdown of skin (HCC)    Foot ulcer, left, with fat layer exposed (HCC)    Hammer toes of both feet        Plan: Follow-up again in 1 month.  He with local wound care and again cautioned on signs of infection call our office should they occur.    The patient indicates understanding of these issues and agrees to the plan.    Porfirio Felix DPM

## 2024-10-31 ENCOUNTER — HOSPITAL ENCOUNTER (EMERGENCY)
Facility: HOSPITAL | Age: 59
Discharge: HOME | End: 2024-10-31
Attending: EMERGENCY MEDICINE
Payer: COMMERCIAL

## 2024-10-31 ENCOUNTER — APPOINTMENT (OUTPATIENT)
Dept: GENERAL RADIOLOGY | Facility: HOSPITAL | Age: 59
End: 2024-10-31
Attending: EMERGENCY MEDICINE
Payer: COMMERCIAL

## 2024-10-31 VITALS
HEART RATE: 78 BPM | SYSTOLIC BLOOD PRESSURE: 168 MMHG | BODY MASS INDEX: 31.15 KG/M2 | DIASTOLIC BLOOD PRESSURE: 82 MMHG | TEMPERATURE: 97 F | RESPIRATION RATE: 16 BRPM | HEIGHT: 72 IN | OXYGEN SATURATION: 98 % | WEIGHT: 230 LBS

## 2024-10-31 DIAGNOSIS — M25.561 ACUTE PAIN OF RIGHT KNEE: Primary | ICD-10-CM

## 2024-10-31 PROCEDURE — 99284 EMERGENCY DEPT VISIT MOD MDM: CPT

## 2024-10-31 PROCEDURE — 73562 X-RAY EXAM OF KNEE 3: CPT | Performed by: EMERGENCY MEDICINE

## 2024-10-31 RX ORDER — CYCLOBENZAPRINE HCL 10 MG
10 TABLET ORAL 3 TIMES DAILY PRN
Qty: 20 TABLET | Refills: 0 | Status: SHIPPED | OUTPATIENT
Start: 2024-10-31 | End: 2024-11-07

## 2024-10-31 RX ORDER — HYDROCODONE BITARTRATE AND ACETAMINOPHEN 5; 325 MG/1; MG/1
1-2 TABLET ORAL EVERY 4 HOURS PRN
Qty: 20 TABLET | Refills: 0 | Status: SHIPPED | OUTPATIENT
Start: 2024-10-31

## 2024-10-31 RX ORDER — HYDROCODONE BITARTRATE AND ACETAMINOPHEN 5; 325 MG/1; MG/1
1 TABLET ORAL ONCE
Status: COMPLETED | OUTPATIENT
Start: 2024-10-31 | End: 2024-10-31

## 2024-10-31 RX ORDER — IBUPROFEN 600 MG/1
600 TABLET, FILM COATED ORAL ONCE
Status: COMPLETED | OUTPATIENT
Start: 2024-10-31 | End: 2024-10-31

## 2024-10-31 NOTE — ED INITIAL ASSESSMENT (HPI)
PT states that he began to feel severe, sudden pain on his right knee when he was trying to stand up from a kneeling position today at approximately 1630 hours. PT state that he experiences a pain level of 10/10 when bending his right leg.

## 2024-11-01 ENCOUNTER — TELEPHONE (OUTPATIENT)
Facility: CLINIC | Age: 59
End: 2024-11-01

## 2024-11-01 DIAGNOSIS — M25.561 RIGHT KNEE PAIN, UNSPECIFIED CHRONICITY: Primary | ICD-10-CM

## 2024-11-01 NOTE — ED PROVIDER NOTES
Patient Seen in: [facility] Emergency Department      History     Chief Complaint   Patient presents with    Knee Pain     Stated Complaint: right knee pain    Subjective:   HPI      59-year-old male presents to the emergency department with complaints of sudden onset of pain in his right knee.  Patient is a  and states that he is very active and he was helping a friend out he states he was up and down stairs and standing up and kneeling down repeatedly and then he got up at 1 point and had severe pain on the lateral side of his knees.  He does not recall doing any specific twisting.  He states that he is fine if his leg is straight but has discomfort when and spent and particularly if he is trying to ambulate on it.  No previous knee problems.  No other injuries or complaints.  Of note patient does have a Ortho boot on the other foot for the last 2 months related to a diabetic foot ulcer.  No calf pain or calf discomfort.  No history of DVT or PE.  No history of Baker's cyst.    Objective:     Past Medical History:    Abdominal hernia    Diabetes mellitus (HCC)    Heartburn    High blood pressure    High cholesterol    Neuropathy    feet    Other and unspecified hyperlipidemia    Pain in joints    Renal disorder    Type II or unspecified type diabetes mellitus without mention of complication, not stated as uncontrolled    Unspecified sleep apnea    NO TREATMENT    Visual impairment    glasses    Wears glasses              Past Surgical History:   Procedure Laterality Date    Colonoscopy      Hernia surgery      Other surgical history  06/2023    2nd toe of left foot amputated    Shoulder surg proc unlisted                  Social History     Socioeconomic History    Marital status:    Tobacco Use    Smoking status: Former     Types: Cigars, Cigarettes    Smokeless tobacco: Never   Vaping Use    Vaping status: Never Used   Substance and Sexual Activity    Alcohol use: Yes     Alcohol/week: 3.0  standard drinks of alcohol     Types: 3 Cans of beer per week     Comment: social    Drug use: No     Social Drivers of Health      [place]                  Physical Exam     ED Triage Vitals [10/31/24 1831]   BP (!) 197/86   Pulse 74   Resp 16   Temp 96.8 °F (36 °C)   Temp src Oral   SpO2 98 %   O2 Device None (Room air)       Current Vitals:   Vital Signs  BP: (!) 168/82  Pulse: 78  Resp: 16  Temp: 96.8 °F (36 °C)  Temp src: Oral  MAP (mmHg): (!) 104    Oxygen Therapy  SpO2: 98 %  O2 Device: None (Room air)        Physical Exam  Vitals and nursing note reviewed. Chaperone present: Wife in room assisting with history.   Constitutional:       General: He is not in acute distress.     Appearance: Normal appearance. He is well-developed.   HENT:      Head: Normocephalic and atraumatic.   Cardiovascular:      Rate and Rhythm: Normal rate and regular rhythm.      Pulses: Normal pulses.      Heart sounds: Normal heart sounds.   Pulmonary:      Effort: Pulmonary effort is normal.      Breath sounds: Normal breath sounds. No stridor.   Musculoskeletal:         General: Tenderness present. No swelling or deformity.      Cervical back: Normal range of motion and neck supple.      Comments: Patient is able to lift right leg against gravity.  Equal pulses throughout, no erythema, no distinct effusion.  Patient has distinct pain with flexion in the meniscal area.  No calf tenderness or calf fullness no tenderness in the popliteal space   Lymphadenopathy:      Cervical: No cervical adenopathy.   Skin:     General: Skin is warm and dry.   Neurological:      General: No focal deficit present.      Mental Status: He is alert and oriented to person, place, and time.            ED Course   Labs Reviewed - No data to display         XR KNEE (3 VIEWS), RIGHT (CPT=73562)    Result Date: 10/31/2024  PROCEDURE:  XR KNEE ROUTINE (3 VIEWS), RIGHT (CPT=73562)  TECHNIQUE:  Three views were  obtained including patellar view.  COMPARISON:  None.  INDICATIONS:  The pain.  PATIENT STATED HISTORY: (As transcribed by Technologist)  Patient stated he felt a sharp pain in his right knee after kneeling on right knee.     FINDINGS:  BONES:  There is moderate tricompartmental osteoarthritis with joint space narrowing osteophyte formation in all 3 compartments.  There is no fracture or dislocation. SOFT TISSUES:  Negative.  No visible soft tissue swelling. EFFUSION:  Trace joint effusion.  To ossific bodies anteriorly in the region of the intracondylar notch measuring 5 x 7 mm representing intra-articular bodies. OTHER:  Negative.            CONCLUSION:  Moderate tricompartmental osteoarthritis of the right knee with ossified intra-articular bodies.   LOCATION:  [facility]   Dictated by (CST): Pritesh Dickey MD on 10/31/2024 at 8:41 PM     Finalized by (CST): Pritesh Dickey MD on 10/31/2024 at 8:42 PM            MDM      Patient was offered pain medications and repeatedly declined but then did ultimately agree.  He was given a dose of ibuprofen and hydrocodone.  He had x-rays of his knee that I reviewed and I do not appreciate any obvious fracture or distinct effusion.  I reviewed radiology report they do feel that he has a small joint effusion and has moderate tricompartmental osteoarthritis.  This that his pain could be related to the osteoarthritis or he could have potentially gotten some meniscal injury he does have some intra-articular bodies and certainly there is a potential that he had some type of ligamentous issue as well.  There is no large joint effusion that would require tapping there is no signs of infection.  Patient will be given a knee immobilizer he is already on meloxicam he will be given further medications to help with pain.  He is to use ice not heat.  He is advised to follow-up with orthopedics.  Patient was discharged in good condition        Medical Decision Making      Disposition and Plan      Clinical Impression:  1. Acute pain of right knee         Disposition:  Discharge  10/31/2024  9:15 pm    Follow-up:  Natalie Kenny DO  [address]  [address]  [facility] [address] [phone]    Schedule an appointment as soon as possible for a visit      [facility] - Orthoped[facility]  [address]  [facility] [address] [phone]  Schedule an appointment as soon as possible for a visit            Medications Prescribed:  Discharge Medication List as of 10/31/2024  9:23 PM        START taking these medications    Details   HYDROcodone-acetaminophen 5-325 MG Oral Tab Take 1-2 tablets by mouth every 4 (four) hours as needed for Pain., Normal, Disp-20 tablet, R-0      cyclobenzaprine 10 MG Oral Tab Take 1 tablet (10 mg total) by mouth 3 (three) times daily as needed for Muscle spasms., Normal, Disp-20 tablet, R-0                 Supplementary Documentation:

## 2024-11-01 NOTE — TELEPHONE ENCOUNTER
Appt on Monday ~  Please advise if you would like Weightbearing Xrays?     Images available in EMR- done yesterday, report below~    Impression   CONCLUSION:  Moderate tricompartmental osteoarthritis of the right knee with ossified intra-articular bodies.

## 2024-11-01 NOTE — TELEPHONE ENCOUNTER
Future Appointments   Date Time Provider Department Center   11/4/2024  8:30 AM Rigoberto Montenegro MD [facility] ORTHOPL [facility] [address]   11/4/2024  3:30 PM Bettye Leon, JORJE, [facility] EMGDIABCTRNA [facility] DI[facility]   11/6/2024  3:00 PM Imtiaz Keyes MD EMGNEPHNAPER [facility] [place]   11/11/2024  4:00 PM Porfirio Felix DPRIMMA [id number]   11/13/2024 12:30 PM Rayo Flood MD [facility] SUB GI   1/16/2025  3:30 PM Nery Raymundo DO [id number] [facility] [place]     Please advise if patient needs right knee xrays.

## 2024-11-04 ENCOUNTER — OFFICE VISIT (OUTPATIENT)
Facility: CLINIC | Age: 59
End: 2024-11-04
Payer: COMMERCIAL

## 2024-11-04 ENCOUNTER — DIABETIC EDUCATION (OUTPATIENT)
Age: 59
End: 2024-11-04
Payer: COMMERCIAL

## 2024-11-04 ENCOUNTER — HOSPITAL ENCOUNTER (OUTPATIENT)
Dept: GENERAL RADIOLOGY | Age: 59
Discharge: HOME | End: 2024-11-04
Attending: STUDENT IN AN ORGANIZED HEALTH CARE EDUCATION/TRAINING PROGRAM
Payer: COMMERCIAL

## 2024-11-04 VITALS — WEIGHT: 233 LBS | BODY MASS INDEX: 31.56 KG/M2 | HEIGHT: 72 IN

## 2024-11-04 DIAGNOSIS — M17.11 PRIMARY OSTEOARTHRITIS OF RIGHT KNEE: Primary | ICD-10-CM

## 2024-11-04 DIAGNOSIS — E11.69 TYPE 2 DIABETES MELLITUS WITH OTHER SPECIFIED COMPLICATION, WITHOUT LONG-TERM CURRENT USE OF INSULIN (HCC): Primary | ICD-10-CM

## 2024-11-04 DIAGNOSIS — M25.561 RIGHT KNEE PAIN, UNSPECIFIED CHRONICITY: ICD-10-CM

## 2024-11-04 PROCEDURE — 73564 X-RAY EXAM KNEE 4 OR MORE: CPT | Performed by: STUDENT IN AN ORGANIZED HEALTH CARE EDUCATION/TRAINING PROGRAM

## 2024-11-04 RX ORDER — TRIAMCINOLONE ACETONIDE 40 MG/ML
40 INJECTION, SUSPENSION INTRA-ARTICULAR; INTRAMUSCULAR ONCE
Status: COMPLETED | OUTPATIENT
Start: 2024-11-04 | End: 2024-11-04

## 2024-11-04 RX ORDER — DAPAGLIFLOZIN 10 MG/1
TABLET, FILM COATED ORAL
COMMUNITY
Start: 2024-10-30

## 2024-11-04 RX ADMIN — TRIAMCINOLONE ACETONIDE 40 MG: 40 INJECTION, SUSPENSION INTRA-ARTICULAR; INTRAMUSCULAR at 16:39:00

## 2024-11-04 NOTE — PROGRESS NOTES
Juan James   6/13/1965 attended Step 1 Diabetes Education.    Referring Provider: Brynn Meehan         Date: 11/4/2024  Start time: 3:05 PM End time: 3:55 PM  _______________________________________________     Mr. James is a 59 year old male who presents today for diabetes education.    Assessment:     Wt Readings from Last 6 Encounters:   10/31/24 230 lb   10/16/24 233 lb   10/16/24 230 lb   09/06/24 240 lb   08/06/24 240 lb   07/09/24 235 lb        Lab Results   Component Value Date    A1C 6.4 (A) 10/16/2024    A1C 7.4 (H) 08/24/2024    A1C 7.0 (A) 06/04/2024    A1C 7.0 (H) 12/29/2023    A1C  10/12/2023      Comment:      Sent to [facility] for testing.        A1C 6.9 (H) 10/12/2023        Medical History:   Past Medical History:    Abdominal hernia    Diabetes mellitus (HCC)    Heartburn    High blood pressure    High cholesterol    Neuropathy    feet    Other and unspecified hyperlipidemia    Pain in joints    Renal disorder    Type II or unspecified type diabetes mellitus without mention of complication, not stated as uncontrolled    Unspecified sleep apnea    NO TREATMENT    Visual impairment    glasses    Wears glasses       Diabetes Pathophysiology:     Diabetes Overview, pathophysiology, A1C results and treatment options for diabetes self-management:   Described basic process and treatment options for diabetes self-management. Discussed recommendations for A1c results to reduce risk of chronic complications.    Monitoring Blood Glucose:     Currently checking BG: using Freestyle Tran.    Reviewed christa with him. He is currently 138 mg/dl  Average over last 14 days: 145 mg/dl    TIR:  >250: 1%  181-250: 14%  : 85%  69-55: 0%  <55: 0%    Monitoring: Instructed/reinforced blood glucose monitoring, testing schedules and target goals:  BG Targets: Fasting / Pre-meal    2 Hour Post-prandial 140-180    Healthy Eating & Being Active:     Obtained usual diet history:    He cut out  Mountain Dew 5 years ago. He is feeling stuck with his food options.     Instructed on basic diet guidelines including aiming for 3 meals/day, balancing each meal with variety of nutrients, using plate method as a model for meals - goal of 1/2 plate non-starchy vegetables, 1/4 plate lean proteins, 1/4 plate carbohydrates and modest portions of fruit, yogurt, milk if desired.    We reviewed impact of carbohydrates, fiber, protein, and fat on glucose levels and trends. We discussed balancing meals and snacks with a combination of carbohydrates, proteins, and fiber to help stabilize glucose levels.     We discussed aiming for consistent meal times/carb amounts can help us determine if medications are correct. Reviewed carbohydrate counting, goals for meals (30-60 grams per meal) and snacks (15 grams per snack). Reviewed that carb counting consistently for a week or so can help regain control over glucose.    Carbohydrate counting christa recommendations provided. Carb \"Cheat Sheet\" provided for quick references.     Being Active: Discussed benefits of regular Physical Activity and goal to complete 30 minutes daily. Discussed ADA recommendations of 150 minutes of exercise each week. Reviewed type of exercise and benefits. Currently does some walking at work. He recently had a toe amputation and is still healing. He has a recent knee injury and had a steroid injection 11/4/2024.        Medications:     Taking Medications: Reviewed current diabetes medications.  Diabetes Medication:     Oral:  Farxiga 10mg daily (in AM), discussed how it works and to stay hydrated.      Lifestyle & Healthy Coping:     Discussed healthy coping options, resources available, and diabetes distress. Discussed signs of diabetes distress and actions to take at that time. Discussed ways to prevent diabetes distress.     Goals:     Problem Solving: Prevention, detection, and treatment of acute complications: taught symptoms of hypoglycemia,  hyperglycemia, how to treat low blood sugar (Rule of 15), and actions for lowering high blood glucose levels. Discussed chronic complications and steps to prevent complications, including attending further diabetes education.    Patient set diabetes self-management goals: improve A1c    Recommendations:     Start monitoring blood glucose and attend Diabetes Step 2 Class.    Review and complete goal sheet and bring to next Diabetes Step Class.    Patient is willing to make lifestyle changes to improve blood glucose management.    Emailed/written materials provided for all areas covered, including Diabetes 101 packet and Initial Diagnosis folder.    Patient verbalized understanding and has no further questions at this time.    Follow-up plan: Schedule for Diabetes Step Class series    Patient to contact diabetes center for questions/concerns.    Bettye VELASCO-RN, [facility]

## 2024-11-04 NOTE — PROGRESS NOTES
Orthopaedic Surgery  [address] [address] [address]   [phone]        Diabetes: Yes, history of diabetic ulcer but reports well controlled recently    Chief Complaint:  Right Knee Pain    History of Present Illness:   Juan James is a 59 year old male seen in clinic today as new for evaluation of their right knee. This has been ongoing for since last Thursday. Pain is located over the anterior and lateral aspect of the knee and described as aching. Their joint pain interferes with their activities of daily life such as walking, going up or down stairs, and kneeling. Their condition is stable.  Associated with their joint pain, they report stiffness, no new catching or locking of the knee. Their symptoms are worsened by weightbearing activity and as the day progresses. They have difficulty with walking prolonged distances on flat surfaces as well as stairs. Their symptoms are made better by rest. They report no lower back or hip pain. No prior surgeries to the involved knee.    He reports a long history of knee pain, for about the last 10 years. He works at [employer] and as a . He also played competitive basketball into his 30s. No injuries in the past.    Prior imaging studies have included XRs. Treatment so far has consisted of conservative management including muscle relaxants, bracing (KI), and crutches. These seem to be helping a bit.    Assistive devices used: none at baseline      PMH/PSH/Family History/Social History/Meds/Allergies:   Past Medical History:    Abdominal hernia    Diabetes mellitus (HCC)    Heartburn    High blood pressure    High cholesterol    Neuropathy    feet    Other and unspecified hyperlipidemia    Pain in joints    Renal disorder    Type II or unspecified type diabetes mellitus without mention of complication, not stated as uncontrolled    Unspecified sleep apnea    NO TREATMENT    Visual impairment    glasses    Wears glasses        Past  Surgical History:   Procedure Laterality Date    Colonoscopy      Hernia surgery      Other surgical history  06/2023    2nd toe of left foot amputated    Shoulder surg proc unlisted          History reviewed. No pertinent family history.     Social History     Socioeconomic History    Marital status:      Spouse name: Not on file    Number of children: Not on file    Years of education: Not on file    Highest education level: Not on file   Occupational History    Not on file   Tobacco Use    Smoking status: Former     Types: Cigars, Cigarettes    Smokeless tobacco: Never   Vaping Use    Vaping status: Never Used   Substance and Sexual Activity    Alcohol use: Yes     Alcohol/week: 3.0 standard drinks of alcohol     Types: 3 Cans of beer per week     Comment: social    Drug use: No    Sexual activity: Not on file   Other Topics Concern    Not on file   Social History Narrative    Not on file     Social Drivers of Health     Financial Resource Strain: Not on file   Food Insecurity: Not on file   Transportation Needs: Not on file   Physical Activity: Not on file   Stress: Not on file   Social Connections: Not on file   Housing Stability: At Risk (8/18/2023)    Received from [facility], [facility]     Living Situation: Not on file     Housing Problems: Not on file        Current Outpatient Medications   Medication Instructions    amLODIPine (NORVASC) 10 mg, Daily    amoxicillin clavulanate 875-125 MG Oral Tab 1 tablet, Oral, 2 times daily    Continuous Blood Gluc Sensor (FREESTYLE NIKHIL 14 DAY SENSOR) Does not apply Misc USE TO CHECK FASTING BLOOD SUGAR IN THE MORNING AND 2 HOURS AFTER EACH MEAL DAILY AS DIRECTED    Continuous Glucose  (FREESTYLE NIKHIL 3 READER) Does not apply Misc 1 each, Does not apply, As Directed    Continuous Glucose Sensor (FREESTYLE NIKHIL 3 PLUS SENSOR) Does not apply Misc 1 each, Does not apply, As Directed, One sensor every 15 days. 30 day supply.     cyclobenzaprine (FLEXERIL) 10 mg, Oral, 3 times daily PRN    Dapagliflozin Propanediol (FARXIGA OR) 1 tablet, Daily    FARXIGA 10 MG Oral Tab     hydroCHLOROthiazide 25 mg, Oral, Daily    HYDROcodone-acetaminophen 5-325 MG Oral Tab 1-2 tablets, Oral, Every 4 hours PRN    Meloxicam 15 mg, Daily    mupirocin 2 % External Ointment Apply a small amount to the ulcerated site on the fourth toe once daily after bathing and cover with a Band-Aid    PEG 3350-KCl-Na Bicarb-NaCl 420 g Oral Recon Soln Take as directed by physician.    rosuvastatin (CRESTOR) 20 mg, Nightly    valsartan (DIOVAN) 320 mg, Daily        Allergies[1]       Physical Exam:   Vitals:    11/04/24 0834   Weight: 233 lb (105.7 kg)   Height: 6' (1.829 m)     Estimated body mass index is 31.6 kg/m² as calculated from the following:    Height as of this encounter: 6' (1.829 m).    Weight as of this encounter: 233 lb (105.7 kg).    Constitutional: No acute distress, well nourished.  Eyes: Anicteric sclera, pink conjunctiva  Ears, Nose, Mouth and Throat: Normocephalic, atraumatic, moist mucous membranes  Cardiovascular: No pitting edema or varicosities in the lower extremities. Maneuvers demonstrate a negative Cheyenne's sign. No palpable cords in calf noted.   Respiratory: No respiratory distress, normal respiratory rhythm and effort   Neurological:  Oriented to person, place, and time.  Psychological:  Appropriate mood and affect.    Comprehensive Right Knee Exam:      Inspection: No erythema, ecchymoses, or wounds. No rash. No previous incisions noted. No effusion. No quad atrophy  Alignment: mild valgus  ROM: 0 - 90 degrees (expect he has more motion but limited beyond 90 due to pain), flexion contracture: 0 degrees, quad lag: no  Stability: A/P stress: stable, firm endpoint, M/L stress: stable, firm endpoint  Pain or crepitus with ROM?: Yes  No tenderness over the medial or lateral joint line at present visit, but reports pain was over the lateral knee and  this was tender during his ED visit last week  Strength: Intact 5/5 strength SLR and TA/GS/FHL/EHL  Sensation: Grossly intact to light touch over SPN/DPN/Saph/Sural/Tibial nerve distributions  Vasc: Warm perfused extremity    Imaging:   Weightbearing XRs of the right knee were obtained with AP, PA Flex, sunrise, and lateral views    They show severe degenerative changes of the knee with joint space narrowing involving the lateral and the patellofemoral compartment, particularly involving the lateral facet with joint space narrowing, osteophyte formation, and subchondral sclerosis. No fracture or dislocation seen. Patella brijesh on the right but clinically appears to have this on the LEFT knee as well.    I personally reviewed and interpreted the radiographs.      Assessment:     ICD-10-CM    1. Primary osteoarthritis of right knee  M17.11 Large joint - right aspiration/injection     triamcinolone acetonide (Kenalog-40) 40 MG/ML injection 40 mg         Pain is likely acute exacerbation of chronic osteoarthritis      Plan:   At today's visit I discussed with the patient their diagnosis and the factors considered to form this diagnosis. Their history and physical exam and radiographic findings are consistent with severe arthritis of the knee. As we discussed their diagnosis, information was provided regarding the underlying pathology and the natural course of this progressive condition.    During our discussion, we detailed various treatment options available. We counseled the patient on treatment options. Major joint arthritis is usually a chronic condition that can be effectively managed with conservative modalities. Once these conservative measures fail to provide reasonable therapeutic benefit, surgical procedures may be indicated.    Conservative measures discussed include activity modification, home exercise programs, physical therapy, oral anti-inflammatories and acetaminophen, braces, assistive devices, and  intraarticular injections.     In consideration of their prior treatments, today, I have recommended continued conservative treatment and recommend NSAIDs, combination of rest, ice, or elevation, physical therapy, and corticosteroid injections. Though his arthritis is radiographically advanced , he has been living with it and for the most part functioning well. I discussed weaning out of the brace during the day as he seems to be getting a bit stiff. He can use it at night for comfort. Otherwise okay for activity and weightbearing as tolerated. It appears that he is able to get good accommodations at work with use of a cart and rest as needed. We discussed that their plan is customized and can be modified at any time with new information or changing exam findings.      Procedure Note  Orders Placed This Encounter   Procedures    Large joint - right aspiration/injection      Risks and benefits of knee injection discussed with the patient, with risks including but not limited to pain and swelling at the injection site and/or within the knee joint, infection, elevation in blood pressure and/or glucose levels, facial flushing. Counseled that he should keep a close eye on his glucose level over the next 3 days. After informed consent, the patient's right knee was marked, locally anesthetized with skin refrigerant, prepped with topical antiseptic, and injected with a mixture of 1mL 40mg/mL Kenalog, 2mL 1% lidocaine and 2mL 0.5% marcaine through the inferolateral portal.  Hemostasis was achieved and a band-aid was applied.  The patient tolerated the procedure well.      Follow up if symptoms persist or worsen. Discussed doing home PT with his daughter once the acute pain subsides.      Thank you very much for allowing me to participate in the care of this patient. If you have any questions, please do not hesitate to contact me.      Rigoberto Montenegro MD  Adult Hip and Knee Reconstruction    Department of Orthopaedic Surgery   [facility]     [address] [place] [address]  [address] [place] [id number]     t: [phone]  f: [phone]       [facility]         [1] No Known Allergies

## 2024-11-04 NOTE — PATIENT INSTRUCTIONS
Schedule the class series for further education.    Medications:    Farxiga 10mg once a day, stay hydrated!    Healthy Eating:    Continue to use the Plate Method as a model for your meals:  1/2 of your plate is non-starchy vegetables, 1/4 of your plate is lean protein, 1/4 of your plate is starchy or carbohydrate foods    Try to measure your foods for a week or so to be able to identify how much you are eating. Carb counting apps can be helpful with this.    Pair a carbohydrate with protein, fat, and fiber foods to help stabilize blood sugar.     Monitoring:     Continue with your Tran sensor.    ADA (American Diabetes Association):  A1c:  7% or less  Fasting Blood Glucose (before you eat breakfast):    2 Hours After a Meal:  Less than 180    Physical Activity:    Try to add in walks after meals. This can help with glucose levels especially after a carbohydrate heavy meal.    There are studies that show that even 20 minutes of exercise can positively impact glucose levels for up to 72 hours after!    Exercise Resources:  Chair exercise videos on [employer] - https://[email or web address].com/watch?v=2wNoTsxlmDI&[email or web address]      Yoga for beginners on [employer] - https://www.[employer].com/watch?v=nQwKKCqkJxg      Coping:    Find a support group on [employer] to find people just like you.    Talk to your family or friends.    Meditate or do yoga. Go for a walk to clear your mind.

## 2024-11-06 ENCOUNTER — OFFICE VISIT (OUTPATIENT)
Dept: NEPHROLOGY | Facility: CLINIC | Age: 59
End: 2024-11-06
Payer: COMMERCIAL

## 2024-11-06 VITALS — BODY MASS INDEX: 31 KG/M2 | SYSTOLIC BLOOD PRESSURE: 158 MMHG | WEIGHT: 230.5 LBS | DIASTOLIC BLOOD PRESSURE: 82 MMHG

## 2024-11-06 DIAGNOSIS — R80.9 PROTEINURIA, UNSPECIFIED TYPE: ICD-10-CM

## 2024-11-06 DIAGNOSIS — I10 ESSENTIAL HYPERTENSION: ICD-10-CM

## 2024-11-06 DIAGNOSIS — N18.30 STAGE 3 CHRONIC KIDNEY DISEASE, UNSPECIFIED WHETHER STAGE 3A OR 3B CKD (HCC): Primary | ICD-10-CM

## 2024-11-06 PROCEDURE — 3079F DIAST BP 80-89 MM HG: CPT | Performed by: INTERNAL MEDICINE

## 2024-11-06 PROCEDURE — 3077F SYST BP >= 140 MM HG: CPT | Performed by: INTERNAL MEDICINE

## 2024-11-06 PROCEDURE — 99214 OFFICE O/P EST MOD 30 MIN: CPT | Performed by: INTERNAL MEDICINE

## 2024-11-06 NOTE — PROGRESS NOTES
Nephrology Progress Note      Juan James is a 59 year old male.    HPI:     Chief Complaint   Patient presents with    Hypertension    Chronic Kidney Disease    Proteinuria       Mr. James was seen in the nephrology clinic today in follow-up for management of chronic kidney disease stage III AA and proteinuria in the setting of diabetes and hypertension.  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  He reports that he has had an ulcer develop on his left fourth toe for which he is in a walking shoe and is undergoing therapy.  Fortunately this has been steadily improving.  In addition he had severe right knee pain 1 week ago and did require a right knee injection.  Blood pressures at home have generally been in the 130s to 150s systolic.  Blood sugars are better, however, with a most recent hemoglobin A1c at 6.4%.      HISTORY:  Past Medical History:    Abdominal hernia    Diabetes mellitus (HCC)    Heartburn    High blood pressure    High cholesterol    Neuropathy    feet    Other and unspecified hyperlipidemia    Pain in joints    Renal disorder    Type II or unspecified type diabetes mellitus without mention of complication, not stated as uncontrolled    Unspecified sleep apnea    NO TREATMENT    Visual impairment    glasses    Wears glasses      Past Surgical History:   Procedure Laterality Date    Colonoscopy      Hernia surgery      Other surgical history  06/2023    2nd toe of left foot amputated    Shoulder surg proc unlisted        History reviewed. No pertinent family history.   Social History:   Social History     Socioeconomic History    Marital status:    Tobacco Use    Smoking status: Former     Types: Cigars, Cigarettes    Smokeless tobacco: Never   Vaping Use    Vaping status: Never Used   Substance and Sexual Activity    Alcohol use: Yes      Alcohol/week: 3.0 standard drinks of alcohol     Types: 3 Cans of beer per week     Comment: social    Drug use: No     Social Drivers of Health      Received from [facility]        Medications (Active prior to today's visit):  Current Outpatient Medications   Medication Sig Dispense Refill    FARXIGA 10 MG Oral Tab       HYDROcodone-acetaminophen 5-325 MG Oral Tab Take 1-2 tablets by mouth every 4 (four) hours as needed for Pain. 20 tablet 0    cyclobenzaprine 10 MG Oral Tab Take 1 tablet (10 mg total) by mouth 3 (three) times daily as needed for Muscle spasms. 20 tablet 0    PEG 3350-KCl-Na Bicarb-NaCl 420 g Oral Recon Soln Take as directed by physician. 4000 mL 0    Continuous Glucose Sensor (FREESTYLE NIKHIL 3 PLUS SENSOR) Does not apply Misc 1 each As Directed. One sensor every 15 days. 30 day supply. 2 each 5    mupirocin 2 % External Ointment Apply a small amount to the ulcerated site on the fourth toe once daily after bathing and cover with a Band-Aid 30 g 0    amoxicillin clavulanate 875-125 MG Oral Tab Take 1 tablet by mouth 2 (two) times daily. (Patient not taking: Reported on 11/4/2024) 20 tablet 0    Continuous Glucose  (FREESTYLE NIKHIL 3 READER) Does not apply Misc 1 each As Directed. 1 each 0    hydroCHLOROthiazide 25 MG Oral Tab Take 1 tablet (25 mg total) by mouth daily. 90 tablet 3    Dapagliflozin Propanediol (FARXIGA OR) Take 1 tablet by mouth daily.      amLODIPine 10 MG Oral Tab Take 1 tablet (10 mg total) by mouth daily.      Continuous Blood Gluc Sensor (FREESTYLE NIKHIL 14 DAY SENSOR) Does not apply Misc USE TO CHECK FASTING BLOOD SUGAR IN THE MORNING AND 2 HOURS AFTER EACH MEAL DAILY AS DIRECTED      Meloxicam 15 MG Oral Tab Take 1 tablet (15 mg total) by mouth daily.      valsartan 160 MG Oral Tab Take 2 tablets (320 mg total) by mouth daily.      rosuvastatin 20 MG Oral Tab Take 1 tablet (20 mg total) by mouth nightly.          Allergies:  Allergies[1]      ROS:     Denies fever/chills  Denies wt loss/gain  Denies HA or visual changes  Denies CP or palpitations  Denies SOB/cough/hemoptysis  Denies abd or flank pain  Denies N/V/D  Denies change in urinary habits or gross hematuria  Denies LE edema  + Right knee pain      PHYSICAL EXAM:   /82 (BP Location: Right arm, Patient Position: Sitting)   Wt 230 lb 8 oz (104.6 kg)   BMI 31.26 kg/m²   Wt Readings from Last 6 Encounters:   11/06/24 230 lb 8 oz (104.6 kg)   11/04/24 233 lb (105.7 kg)   10/31/24 230 lb (104.3 kg)   10/16/24 233 lb (105.7 kg)   10/16/24 230 lb (104.3 kg)   09/06/24 240 lb (108.9 kg)       General: Alert and oriented in no apparent distress.  HEENT: No scleral icterus, MMM  Neck: Supple, no REJI or thyromegaly  Cardiac: Regular rate and rhythm, S1, S2 normal, no murmur or rub  Lungs: Clear without wheezes, rales, rhonchi.    Extremities: Without clubbing, cyanosis or edema.  Left foot is in a walking shoe  Neurologic: Alert and oriented, normal affect, cranial nerves grossly intact, moving all extremities  Skin: Warm and dry, no rashes      LABS:     Lab Results   Component Value Date    BUN 45 (H) 08/24/2024    BUNCREA 20.8 (H) 05/14/2019    CREATSERUM 1.57 (H) 08/24/2024    ANIONGAP 7 08/24/2024    GFRNAA 104 05/14/2019    GFRAA 120 05/14/2019    CA 10.7 (H) 08/24/2024    OSMOCALC 305 (H) 08/24/2024    ALKPHO 79 08/24/2024    AST 19 08/24/2024    ALT 22 08/24/2024    BILT 0.4 08/24/2024    TP 8.0 08/24/2024    TP 7.4 08/24/2024    ALB 4.8 08/24/2024    ALB 4.06 08/24/2024    GLOBULIN 3.2 08/24/2024     08/24/2024    K 5.6 (H) 08/24/2024     08/24/2024    CO2 24.0 08/24/2024     Lab Results   Component Value Date    RBC 3.99 (L) 08/24/2024    HGB 11.7 (L) 08/24/2024    HCT 36.8 (L) 08/24/2024    .0 08/24/2024    MCV 92.2 08/24/2024    MCH 29.3 08/24/2024    MCHC 31.8 08/24/2024    RDW 12.9 08/24/2024    NEPRELIM 4.42 08/24/2024    NEPERCENT  75.0 08/24/2024    LYPERCENT 17.5 08/24/2024    MOPERCENT 5.6 08/24/2024    EOPERCENT 1.0 08/24/2024    BAPERCENT 0.7 08/24/2024    NE 4.42 08/24/2024    LYMABS 1.03 08/24/2024    MOABSO 0.33 08/24/2024    EOABSO 0.06 08/24/2024    BAABSO 0.04 08/24/2024     Lab Results   Component Value Date    CREUR 68.30 07/15/2024     Lab Results   Component Value Date    CLARITY Clear 07/15/2024    SPECGRAVITY 1.019 07/15/2024    GLUUR >1000 (A) 07/15/2024    BILUR Negative 07/15/2024    KETUR Negative 07/15/2024    BLOODURINE 1+ (A) 07/15/2024    PHURINE 5.5 07/15/2024    PROUR 300 (A) 07/15/2024    UROBILINOGEN Normal 07/15/2024    NITRITE Negative 07/15/2024    LEUUR Negative 07/15/2024    WBCUR 1-5 07/15/2024    RBCUR 0-2 07/15/2024    EPIUR Few (A) 07/15/2024    BACUR None Seen 07/15/2024     ASSESSMENT/PLAN:     #1.  Chronic kidney disease stage IIIa-he has had longstanding chronic kidney disease and is noted to have had biopsy-proven diabetic nodular glomerulosclerosis as well as changes consistent with hypertensive nephrosclerosis.  Most recent creatinine was close to 1.6 mg/dL or so.  We did discuss the importance of ongoing good blood glucose control and maintaining his kidney health over time and also good blood pressure control including the use of medications which block the renin-angiotensin system.  He is on losartan and his blood pressure is mildly elevated in the office today.  We will continue to monitor the renal function on an every 6-month or so basis    #2.  Hypertension-blood pressure is elevated in the office today despite his current doses of amlodipine at 10 mg daily, Diovan 160 mg twice daily and hydrochlorothiazide 25 mg daily.  I had suggested adding a fourth antihypertensive agent, however he would prefer to attempt to lower his blood pressures through increasing exercise and for now he will monitor over the next 6 months and we will follow-up at that point      Thank you again for allowing me to  participate in care of your patient.  Please do not hesitate to call with any questions or concerns.      Imtiaz Keyes MD  11/6/2024  3:07 PM             [1] No Known Allergies

## 2024-11-11 ENCOUNTER — OFFICE VISIT (OUTPATIENT)
Dept: PODIATRY CLINIC | Facility: CLINIC | Age: 59
End: 2024-11-11
Payer: COMMERCIAL

## 2024-11-11 DIAGNOSIS — E11.621 DIABETIC ULCER OF TOE OF LEFT FOOT ASSOCIATED WITH TYPE 2 DIABETES MELLITUS, LIMITED TO BREAKDOWN OF SKIN (HCC): Primary | ICD-10-CM

## 2024-11-11 DIAGNOSIS — L97.521 DIABETIC ULCER OF TOE OF LEFT FOOT ASSOCIATED WITH TYPE 2 DIABETES MELLITUS, LIMITED TO BREAKDOWN OF SKIN (HCC): Primary | ICD-10-CM

## 2024-11-11 PROCEDURE — 99213 OFFICE O/P EST LOW 20 MIN: CPT | Performed by: PODIATRIST

## 2024-11-17 NOTE — PROGRESS NOTES
Juan Jmaes is a 59 year old male.   Chief Complaint   Patient presents with    Follow - Up     Left foot- patient denies pain          HPI:   Patient returns to clinic for checkup on his fourth toe denies any pain or drainage at today's visit reviewed nurse's history as taken above, allergies medications and medical history as documented below.  All changes duly noted  Allergies: Patient has no known allergies.   Current Outpatient Medications   Medication Sig Dispense Refill    FARXIGA 10 MG Oral Tab       HYDROcodone-acetaminophen 5-325 MG Oral Tab Take 1-2 tablets by mouth every 4 (four) hours as needed for Pain. 20 tablet 0    PEG 3350-KCl-Na Bicarb-NaCl 420 g Oral Recon Soln Take as directed by physician. 4000 mL 0    Continuous Glucose Sensor (FREESTYLE NIKHIL 3 PLUS SENSOR) Does not apply Misc 1 each As Directed. One sensor every 15 days. 30 day supply. 2 each 5    mupirocin 2 % External Ointment Apply a small amount to the ulcerated site on the fourth toe once daily after bathing and cover with a Band-Aid 30 g 0    Continuous Glucose  (FREESTYLE NIKHIL 3 READER) Does not apply Misc 1 each As Directed. 1 each 0    hydroCHLOROthiazide 25 MG Oral Tab Take 1 tablet (25 mg total) by mouth daily. 90 tablet 3    Dapagliflozin Propanediol (FARXIGA OR) Take 1 tablet by mouth daily.      amLODIPine 10 MG Oral Tab Take 1 tablet (10 mg total) by mouth daily.      Continuous Blood Gluc Sensor (FREESTYLE NIKHIL 14 DAY SENSOR) Does not apply Misc USE TO CHECK FASTING BLOOD SUGAR IN THE MORNING AND 2 HOURS AFTER EACH MEAL DAILY AS DIRECTED      Meloxicam 15 MG Oral Tab Take 1 tablet (15 mg total) by mouth daily.      valsartan 160 MG Oral Tab Take 2 tablets (320 mg total) by mouth daily.      rosuvastatin 20 MG Oral Tab Take 1 tablet (20 mg total) by mouth nightly.      amoxicillin clavulanate 875-125 MG Oral Tab Take 1 tablet by mouth 2 (two) times daily. (Patient not taking: Reported on 11/11/2024) 20  tablet 0      Past Medical History:    Abdominal hernia    Diabetes mellitus (HCC)    Heartburn    High blood pressure    High cholesterol    Neuropathy    feet    Other and unspecified hyperlipidemia    Pain in joints    Renal disorder    Type II or unspecified type diabetes mellitus without mention of complication, not stated as uncontrolled    Unspecified sleep apnea    NO TREATMENT    Visual impairment    glasses    Wears glasses      Past Surgical History:   Procedure Laterality Date    Colonoscopy      Hernia surgery      Other surgical history  06/2023    2nd toe of left foot amputated    Shoulder surg proc unlisted        History reviewed. No pertinent family history.   Social History     Socioeconomic History    Marital status:    Tobacco Use    Smoking status: Former     Types: Cigars, Cigarettes    Smokeless tobacco: Never   Vaping Use    Vaping status: Never Used   Substance and Sexual Activity    Alcohol use: Yes     Alcohol/week: 3.0 standard drinks of alcohol     Types: 3 Cans of beer per week     Comment: social    Drug use: No           REVIEW OF SYSTEMS:   Today reviewed systens as documented below  GENERAL HEALTH: feels well otherwise  SKIN: Refer to exam below  RESPIRATORY: denies shortness of breath with exertion  CARDIOVASCULAR: denies chest pain on exertion  GI: denies abdominal pain and denies heartburn  NEURO: denies headaches    EXAM:   There were no vitals taken for this visit.  GENERAL: well developed, well nourished, in no apparent distress  EXTREMITIES:   1. Integument: The skin on his left foot was evaluated surgical scar from previous surgeries are well-healed.  In addition to that the ulceration is almost fully epithelialized trimmed down debrided some hyperkeratosis no drainage noted Band-Aid applied   2. Vascular: Patient has palpable pulse   3. Neurologic: Patient has intact   4. Musculoskeletal: Patient has good muscle strength.    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    The patient indicates understanding of these issues and agrees to the plan.    Porfirio Felix DPM

## 2024-12-02 ENCOUNTER — LAB ENCOUNTER (OUTPATIENT)
Dept: LAB | Age: 59
End: 2024-12-02
Attending: FAMILY MEDICINE
Payer: COMMERCIAL

## 2024-12-02 DIAGNOSIS — D64.9 ANEMIA, UNSPECIFIED: Primary | ICD-10-CM

## 2024-12-02 DIAGNOSIS — E55.9 AVITAMINOSIS D: ICD-10-CM

## 2024-12-02 DIAGNOSIS — E78.5 HYPERLIPEMIA: ICD-10-CM

## 2024-12-02 DIAGNOSIS — E11.42 DIABETIC POLYNEUROPATHY (HCC): ICD-10-CM

## 2024-12-02 LAB
ALBUMIN SERPL-MCNC: 4.2 G/DL (ref 3.2–4.8)
ALBUMIN/GLOB SERPL: 1.4 {RATIO} (ref 1–2)
ALP LIVER SERPL-CCNC: 82 U/L
ALT SERPL-CCNC: 22 U/L
ANION GAP SERPL CALC-SCNC: 5 MMOL/L (ref 0–18)
AST SERPL-CCNC: 18 U/L (ref ?–34)
BASOPHILS # BLD AUTO: 0.04 X10(3) UL (ref 0–0.2)
BASOPHILS NFR BLD AUTO: 0.6 %
BILIRUB SERPL-MCNC: 0.3 MG/DL (ref 0.3–1.2)
BUN BLD-MCNC: 33 MG/DL (ref 9–23)
CALCIUM BLD-MCNC: 9.6 MG/DL (ref 8.7–10.4)
CHLORIDE SERPL-SCNC: 112 MMOL/L (ref 98–112)
CHOLEST SERPL-MCNC: 182 MG/DL (ref ?–200)
CO2 SERPL-SCNC: 26 MMOL/L (ref 21–32)
CREAT BLD-MCNC: 1.49 MG/DL
EGFRCR SERPLBLD CKD-EPI 2021: 54 ML/MIN/1.73M2 (ref 60–?)
EOSINOPHIL # BLD AUTO: 0.16 X10(3) UL (ref 0–0.7)
EOSINOPHIL NFR BLD AUTO: 2.5 %
ERYTHROCYTE [DISTWIDTH] IN BLOOD BY AUTOMATED COUNT: 13.4 %
EST. AVERAGE GLUCOSE BLD GHB EST-MCNC: 154 MG/DL (ref 68–126)
FASTING PATIENT LIPID ANSWER: YES
FASTING STATUS PATIENT QL REPORTED: YES
GLOBULIN PLAS-MCNC: 2.9 G/DL (ref 2–3.5)
GLUCOSE BLD-MCNC: 139 MG/DL (ref 70–99)
HBA1C MFR BLD: 7 % (ref ?–5.7)
HCT VFR BLD AUTO: 34.1 %
HDLC SERPL-MCNC: 38 MG/DL (ref 40–59)
HGB BLD-MCNC: 10.7 G/DL
IMM GRANULOCYTES # BLD AUTO: 0.01 X10(3) UL (ref 0–1)
IMM GRANULOCYTES NFR BLD: 0.2 %
LDLC SERPL CALC-MCNC: 107 MG/DL (ref ?–100)
LYMPHOCYTES # BLD AUTO: 1.55 X10(3) UL (ref 1–4)
LYMPHOCYTES NFR BLD AUTO: 23.9 %
MCH RBC QN AUTO: 29.2 PG (ref 26–34)
MCHC RBC AUTO-ENTMCNC: 31.4 G/DL (ref 31–37)
MCV RBC AUTO: 93.2 FL
MONOCYTES # BLD AUTO: 0.53 X10(3) UL (ref 0.1–1)
MONOCYTES NFR BLD AUTO: 8.2 %
NEUTROPHILS # BLD AUTO: 4.19 X10 (3) UL (ref 1.5–7.7)
NEUTROPHILS # BLD AUTO: 4.19 X10(3) UL (ref 1.5–7.7)
NEUTROPHILS NFR BLD AUTO: 64.6 %
NONHDLC SERPL-MCNC: 144 MG/DL (ref ?–130)
OSMOLALITY SERPL CALC.SUM OF ELEC: 306 MOSM/KG (ref 275–295)
PLATELET # BLD AUTO: 183 10(3)UL (ref 150–450)
POTASSIUM SERPL-SCNC: 5.4 MMOL/L (ref 3.5–5.1)
PROT SERPL-MCNC: 7.1 G/DL (ref 5.7–8.2)
RBC # BLD AUTO: 3.66 X10(6)UL
SODIUM SERPL-SCNC: 143 MMOL/L (ref 136–145)
TRIGL SERPL-MCNC: 210 MG/DL (ref 30–149)
VIT D+METAB SERPL-MCNC: 28.9 NG/ML (ref 30–100)
VLDLC SERPL CALC-MCNC: 36 MG/DL (ref 0–30)
WBC # BLD AUTO: 6.5 X10(3) UL (ref 4–11)

## 2024-12-02 PROCEDURE — 80061 LIPID PANEL: CPT

## 2024-12-02 PROCEDURE — 82306 VITAMIN D 25 HYDROXY: CPT

## 2024-12-02 PROCEDURE — 83036 HEMOGLOBIN GLYCOSYLATED A1C: CPT

## 2024-12-02 PROCEDURE — 80053 COMPREHEN METABOLIC PANEL: CPT

## 2024-12-02 PROCEDURE — 85025 COMPLETE CBC W/AUTO DIFF WBC: CPT

## 2024-12-02 PROCEDURE — 36415 COLL VENOUS BLD VENIPUNCTURE: CPT

## 2024-12-12 ENCOUNTER — OFFICE VISIT (OUTPATIENT)
Dept: PODIATRY CLINIC | Facility: CLINIC | Age: 59
End: 2024-12-12

## 2024-12-12 DIAGNOSIS — L97.521 DIABETIC ULCER OF TOE OF LEFT FOOT ASSOCIATED WITH TYPE 2 DIABETES MELLITUS, LIMITED TO BREAKDOWN OF SKIN (HCC): Primary | ICD-10-CM

## 2024-12-12 DIAGNOSIS — E11.621 DIABETIC ULCER OF TOE OF LEFT FOOT ASSOCIATED WITH TYPE 2 DIABETES MELLITUS, LIMITED TO BREAKDOWN OF SKIN (HCC): Primary | ICD-10-CM

## 2024-12-12 DIAGNOSIS — M20.41 HAMMER TOES OF BOTH FEET: ICD-10-CM

## 2024-12-12 DIAGNOSIS — M20.42 HAMMER TOES OF BOTH FEET: ICD-10-CM

## 2024-12-12 DIAGNOSIS — E11.42 DIABETIC PERIPHERAL NEUROPATHY (HCC): ICD-10-CM

## 2024-12-12 PROCEDURE — 99213 OFFICE O/P EST LOW 20 MIN: CPT | Performed by: PODIATRIST

## 2024-12-16 NOTE — PROGRESS NOTES
Juan James is a 59 year old male.   Chief Complaint   Patient presents with    Follow - Up     Left foot ulcer- patient denies pain          HPI:   Returns to clinic for follow-up on his ulcerative fourth toe of his left foot.  At today's visit reviewed nurse's history as taken above, allergies medications and medical history as documented below.  All changes duly noted  Allergies: Patient has no known allergies.   Current Outpatient Medications   Medication Sig Dispense Refill    FARXIGA 10 MG Oral Tab       HYDROcodone-acetaminophen 5-325 MG Oral Tab Take 1-2 tablets by mouth every 4 (four) hours as needed for Pain. 20 tablet 0    PEG 3350-KCl-Na Bicarb-NaCl 420 g Oral Recon Soln Take as directed by physician. 4000 mL 0    Continuous Glucose Sensor (FREESTYLE NIKHIL 3 PLUS SENSOR) Does not apply Misc 1 each As Directed. One sensor every 15 days. 30 day supply. 2 each 5    mupirocin 2 % External Ointment Apply a small amount to the ulcerated site on the fourth toe once daily after bathing and cover with a Band-Aid 30 g 0    amoxicillin clavulanate 875-125 MG Oral Tab Take 1 tablet by mouth 2 (two) times daily. 20 tablet 0    Continuous Glucose  (FREESTYLE NIKHIL 3 READER) Does not apply Misc 1 each As Directed. 1 each 0    hydroCHLOROthiazide 25 MG Oral Tab Take 1 tablet (25 mg total) by mouth daily. 90 tablet 3    Dapagliflozin Propanediol (FARXIGA OR) Take 1 tablet by mouth daily.      amLODIPine 10 MG Oral Tab Take 1 tablet (10 mg total) by mouth daily.      Continuous Blood Gluc Sensor (FREESTYLE NIKHIL 14 DAY SENSOR) Does not apply Misc USE TO CHECK FASTING BLOOD SUGAR IN THE MORNING AND 2 HOURS AFTER EACH MEAL DAILY AS DIRECTED      Meloxicam 15 MG Oral Tab Take 1 tablet (15 mg total) by mouth daily.      valsartan 160 MG Oral Tab Take 2 tablets (320 mg total) by mouth daily.      rosuvastatin 20 MG Oral Tab Take 1 tablet (20 mg total) by mouth nightly.        Past Medical History:     Abdominal hernia    Diabetes mellitus (HCC)    Heartburn    High blood pressure    High cholesterol    Neuropathy    feet    Other and unspecified hyperlipidemia    Pain in joints    Renal disorder    Type II or unspecified type diabetes mellitus without mention of complication, not stated as uncontrolled    Unspecified sleep apnea    NO TREATMENT    Visual impairment    glasses    Wears glasses      Past Surgical History:   Procedure Laterality Date    Colonoscopy      Hernia surgery      Other surgical history  06/2023    2nd toe of left foot amputated    Shoulder surg proc unlisted        History reviewed. No pertinent family history.   Social History     Socioeconomic History    Marital status:    Tobacco Use    Smoking status: Former     Types: Cigars, Cigarettes    Smokeless tobacco: Never   Vaping Use    Vaping status: Never Used   Substance and Sexual Activity    Alcohol use: Yes     Alcohol/week: 3.0 standard drinks of alcohol     Types: 3 Cans of beer per week     Comment: social    Drug use: No           REVIEW OF SYSTEMS:   Today reviewed systens as documented below  GENERAL HEALTH: feels well otherwise  SKIN: Refer to exam below  RESPIRATORY: denies shortness of breath with exertion  CARDIOVASCULAR: denies chest pain on exertion  GI: denies abdominal pain and denies heartburn  NEURO: denies headaches    EXAM:   There were no vitals taken for this visit.  GENERAL: well developed, well nourished, in no apparent distress  EXTREMITIES:   1. Integument: Skin is left foot was evaluated no incision lines remain well-healed the fourth toe has a callus at the very distal plantar tip which was trimmed there is no ulceration present is completely healed   2. Vascular: Has palpable pulses   3. Neurologic: Has diabetic peripheral neuropathy loss of protective sensation   4. Musculoskeletal: Patient has a second toe amputation first MTP joint fusion he has a medial deviation of the third toe with a partially  amputated fourth toe all on the left foot.    ASSESSMENT AND PLAN:   Diagnoses and all orders for this visit:    Diabetic ulcer of toe of left foot associated with type 2 diabetes mellitus, limited to breakdown of skin (HCC)    Hammer toes of both feet    Diabetic peripheral neuropathy (HCC)        Plan: As long as the patient is wearing his diabetic shoes he can return to full work duties without restrictions beginning tomorrow should see him in follow-up in another month or so.    The patient indicates understanding of these issues and agrees to the plan.    Porfirio Felix DPM

## 2025-01-10 NOTE — PROGRESS NOTES
Juan Beltran Ceeseblen 6/13/1965 attended Class 2 Group Diabetes Education Class: Intro to Diabetes, Types of Diabetes, Monitoring, Hypoglycemia, Hyperglycemia.     1/10/2025        Referring Provider: Brynn Ashton  Start time: 5:30pm        End time: 6:30pm    Diabetes Overview, pathophysiology, pre-diabetes, A1C results and treatment options for diabetes self-management, types of diabetes and risk factors. Types of diabetes including Type 1, Type 2, SINDY, ESSENCE, CFRD, Gestational, and NODAT.    Healthy Eating  Reviewed the balanced plate method.  Reviewed eating at regular intervals can help with medications.  Hypoglycemia signs. Symptoms and treatment using the Rule of 15 for treating hypoglycemia.  Apps helpful for smart phones as well as websites provided.    Being Active  Discussed exercise benefits.    Taking Medications  Discussed medication methods of action, side effects.  Reviewed that treatment plans can change, when change may occur.    Monitoring  Benefits and options for glucose monitoring, target BG goals, HgbA1C values.  How to use a glucometer.  CGM vs A1c results.    Problem Solving: Prevention, detection and treatment of acute complications  Discussed signs, symptoms and treatment of hypoglycemia and hyperglycemia, as well as sick day management.    Behavior Change  Goals set for nutrition and medications, pre and post-tests taken.    Recommendations:    Begin implementing healthy eating habits with portion control and attend Class 3.  Written materials provided for all areas covered.    Silke Collins RD, , LD, [facility]

## 2025-01-14 ENCOUNTER — NURSE ONLY (OUTPATIENT)
Facility: CLINIC | Age: 60
End: 2025-01-14
Payer: COMMERCIAL

## 2025-01-14 DIAGNOSIS — E11.69 TYPE 2 DIABETES MELLITUS WITH OTHER SPECIFIED COMPLICATION, WITHOUT LONG-TERM CURRENT USE OF INSULIN (HCC): Primary | ICD-10-CM

## 2025-01-16 ENCOUNTER — OFFICE VISIT (OUTPATIENT)
Facility: CLINIC | Age: 60
End: 2025-01-16
Payer: COMMERCIAL

## 2025-01-16 VITALS
DIASTOLIC BLOOD PRESSURE: 80 MMHG | HEART RATE: 76 BPM | BODY MASS INDEX: 31 KG/M2 | SYSTOLIC BLOOD PRESSURE: 138 MMHG | HEIGHT: 72 IN | OXYGEN SATURATION: 98 %

## 2025-01-16 DIAGNOSIS — E11.69 TYPE 2 DIABETES MELLITUS WITH OTHER SPECIFIED COMPLICATION, WITHOUT LONG-TERM CURRENT USE OF INSULIN (HCC): Primary | ICD-10-CM

## 2025-01-16 DIAGNOSIS — E11.69 HYPERLIPIDEMIA ASSOCIATED WITH TYPE 2 DIABETES MELLITUS (HCC): ICD-10-CM

## 2025-01-16 DIAGNOSIS — E78.5 HYPERLIPIDEMIA ASSOCIATED WITH TYPE 2 DIABETES MELLITUS (HCC): ICD-10-CM

## 2025-01-16 PROCEDURE — 99214 OFFICE O/P EST MOD 30 MIN: CPT | Performed by: STUDENT IN AN ORGANIZED HEALTH CARE EDUCATION/TRAINING PROGRAM

## 2025-01-16 PROCEDURE — 3075F SYST BP GE 130 - 139MM HG: CPT | Performed by: STUDENT IN AN ORGANIZED HEALTH CARE EDUCATION/TRAINING PROGRAM

## 2025-01-16 PROCEDURE — 3079F DIAST BP 80-89 MM HG: CPT | Performed by: STUDENT IN AN ORGANIZED HEALTH CARE EDUCATION/TRAINING PROGRAM

## 2025-01-16 RX ORDER — HYDROCHLOROTHIAZIDE 12.5 MG/1
1 CAPSULE ORAL AS DIRECTED
Qty: 2 EACH | Refills: 5 | Status: SHIPPED | OUTPATIENT
Start: 2025-01-16

## 2025-01-16 RX ORDER — ROSUVASTATIN CALCIUM 40 MG/1
40 TABLET, COATED ORAL NIGHTLY
Qty: 90 TABLET | Refills: 0 | Status: SHIPPED | OUTPATIENT
Start: 2025-01-16

## 2025-01-16 NOTE — PROGRESS NOTES
[facility] Note    Name: Juan James    Date: 1/16/2025    Chief complaint: Diabetes (T2DM Followup, Tran sent to Provider for Review no concerns needs refills /Last A1c 7.0 12/2024)       Subjective:    Initial HPI consult in October 2023  Juan James is a 59 year old male who presents for evaluation of T2DM management. A1C 7.0 12/2023    DM hx:  -Diagnosed with diabetes in 2019  -Re: potential DM medication contraindication: denies history of pancreatitis, personal/fam hx of medullary thyroid ca/MEN2, UTI/yeast infxn, gastroparesis  -Presence of associated DM complications (if positive, checkbox selected):  [] Macrovascular complications (CAD/CVA/PAD)  [] Neuropathy  [] Retinopathy  [x] nephropathy with positive urine microalbumin  [x] HTN  [] Hyperlipidemia    DM meds at first office visit: Farxiga 10 mg daily  Previously trialed/failed DM meds: metformin but was discontinued, unsure why     Interval history  10/16/2024-dulce maria/ Kaylene KELLEY.Last A1c value was 7% done 12/2/2024.  Last office visit, continued with current dose of farxiga. He is hoping to get more education on his diabetes  His wound is stable, continues to follow with Dr. Felix   DM meds at visit: farxiga 10mg daily     Continuous Glucose Monitoring Interpretation  Juan James has undergone continuous glucose monitoring with their CGM.  The blood glucose tracings were evaluated for two weeks prior to office visit.   Blood glucose tracings demonstrated no significant hyperglycemia There were no areas of hypoglycemia noted.    01/16/25  Labs: 12/2/2024 glucose 139, potassium 5.4, creatinine 1.49, EGFR 54, A1c 7, hemoglobin 10.7, , vitamin D 28.9  DM Meds: Farxiga Tabs - 10 MG  Had been off of tran, restarted on tran 2 since he had some leftover  Notes glucose is 120-140s. Denies recent hypoglycemic events               History/Other:    Allergies, PMH, SocHx and FHx reviewed and updated as  appropriate in Epic on    Continuous Glucose Sensor (FREESTYLE NIKHIL 3 PLUS SENSOR) Does not apply Misc 1 each As Directed. One sensor every 15 days. 30 day supply. 2 each 5    rosuvastatin 40 MG Oral Tab Take 1 tablet (40 mg total) by mouth nightly. 90 tablet 0    FARXIGA 10 MG Oral Tab       HYDROcodone-acetaminophen 5-325 MG Oral Tab Take 1-2 tablets by mouth every 4 (four) hours as needed for Pain. 20 tablet 0    PEG 3350-KCl-Na Bicarb-NaCl 420 g Oral Recon Soln Take as directed by physician. 4000 mL 0    mupirocin 2 % External Ointment Apply a small amount to the ulcerated site on the fourth toe once daily after bathing and cover with a Band-Aid 30 g 0    amoxicillin clavulanate 875-125 MG Oral Tab Take 1 tablet by mouth 2 (two) times daily. 20 tablet 0    Continuous Glucose  (FREESTYLE NIKHIL 3 READER) Does not apply Misc 1 each As Directed. 1 each 0    hydroCHLOROthiazide 25 MG Oral Tab Take 1 tablet (25 mg total) by mouth daily. 90 tablet 3    Dapagliflozin Propanediol (FARXIGA OR) Take 1 tablet by mouth daily.      amLODIPine 10 MG Oral Tab Take 1 tablet (10 mg total) by mouth daily.      Continuous Blood Gluc Sensor (FREESTYLE NIKHIL 14 DAY SENSOR) Does not apply Misc USE TO CHECK FASTING BLOOD SUGAR IN THE MORNING AND 2 HOURS AFTER EACH MEAL DAILY AS DIRECTED      Meloxicam 15 MG Oral Tab Take 1 tablet (15 mg total) by mouth daily.      valsartan 160 MG Oral Tab Take 2 tablets (320 mg total) by mouth daily.       No Known Allergies  Current Outpatient Medications   Medication Sig Dispense Refill    Continuous Glucose Sensor (FREESTYLE NIKHIL 3 PLUS SENSOR) Does not apply Misc 1 each As Directed. One sensor every 15 days. 30 day supply. 2 each 5    rosuvastatin 40 MG Oral Tab Take 1 tablet (40 mg total) by mouth nightly. 90 tablet 0    FARXIGA 10 MG Oral Tab       HYDROcodone-acetaminophen 5-325 MG Oral Tab Take 1-2 tablets by mouth every 4 (four) hours as needed for Pain. 20 tablet 0    PEG  3350-KCl-Na Bicarb-NaCl 420 g Oral Recon Soln Take as directed by physician. 4000 mL 0    mupirocin 2 % External Ointment Apply a small amount to the ulcerated site on the fourth toe once daily after bathing and cover with a Band-Aid 30 g 0    amoxicillin clavulanate 875-125 MG Oral Tab Take 1 tablet by mouth 2 (two) times daily. 20 tablet 0    Continuous Glucose  (FREESTYLE NIKHIL 3 READER) Does not apply Misc 1 each As Directed. 1 each 0    hydroCHLOROthiazide 25 MG Oral Tab Take 1 tablet (25 mg total) by mouth daily. 90 tablet 3    Dapagliflozin Propanediol (FARXIGA OR) Take 1 tablet by mouth daily.      amLODIPine 10 MG Oral Tab Take 1 tablet (10 mg total) by mouth daily.      Continuous Blood Gluc Sensor (FREESTYLE NIKHIL 14 DAY SENSOR) Does not apply Misc USE TO CHECK FASTING BLOOD SUGAR IN THE MORNING AND 2 HOURS AFTER EACH MEAL DAILY AS DIRECTED      Meloxicam 15 MG Oral Tab Take 1 tablet (15 mg total) by mouth daily.      valsartan 160 MG Oral Tab Take 2 tablets (320 mg total) by mouth daily.       Past Medical History:    Abdominal hernia    Diabetes mellitus (HCC)    Heartburn    High blood pressure    High cholesterol    Neuropathy    feet    Other and unspecified hyperlipidemia    Pain in joints    Renal disorder    Type II or unspecified type diabetes mellitus without mention of complication, not stated as uncontrolled    Unspecified sleep apnea    NO TREATMENT    Visual impairment    glasses    Wears glasses     Past Surgical History:   Procedure Laterality Date    Colonoscopy      Hernia surgery      Other surgical history  06/2023    2nd toe of left foot amputated    Shoulder surg proc unlisted       Social History     Socioeconomic History    Marital status:    Tobacco Use    Smoking status: Former     Types: Cigars, Cigarettes    Smokeless tobacco: Never   Vaping Use    Vaping status: Never Used   Substance and Sexual Activity    Alcohol use: Yes     Alcohol/week: 3.0 standard drinks  of alcohol     Types: 3 Cans of beer per week     Comment: social    Drug use: No     Social Drivers of Health      Received from [facility]     History reviewed. No pertinent family history.    ROS/Exam    REVIEW OF SYSTEMS: Ten point review of systems has been performed and is otherwise negative and/or non-contributory, except as described above.     VITALS  Vitals:    01/16/25 1525   BP: 138/80   Pulse: 76   SpO2: 98%   Height: 6' (1.829 m)       PHYSICAL EXAM  CONSTITUTIONAL:  awake, alert, cooperative, no apparent distress, and appears stated age  PSYCH: normal affect  LUNGS: breathing comfortably  CARDIOVASCULAR:  regular rate   ENT:  no thyromegaly    Labs/Imaging: Pertinent imaging reviewed.    Overall glucose control:  Lab Results   Component Value Date    A1C 7.0 (H) 12/02/2024    A1C 6.4 (A) 10/16/2024    A1C 7.4 (H) 08/24/2024    A1C 7.0 (A) 06/04/2024    A1C 7.0 (H) 12/29/2023       Assessment & Plan:     ICD-10-CM    1. Type 2 diabetes mellitus with other specified complication, without long-term current use of insulin ([facility])  E11.69 Continuous Glucose Sensor (FREESTYLE NIKHIL 3 PLUS SENSOR) Does not apply Misc      2. Hyperlipidemia associated with type 2 diabetes mellitus (HCC)  E11.69 rosuvastatin 40 MG Oral Tab    E78.5 Lipid Panel [E]              Juan James is a pleasant 59 year old male here for evaluation of:    #Diabetes- PMHx of Type 2 diabetes mellitus diagnosed in 2019. Last A1c value was 7.4% done 8/24/2024 -->  trended down to Last A1c value was 7% done 12/2/2024.  Goal <7%.     Patient has been on Farxiga since 2021;  6/2023 vascular duplex without PAD. Of note, the CANVAS and CANVAS-R trials reported a two-fold increase in the occurrence of lower limb amputations (LLAs) in participants receiving canagliflozin. These were predominantly toe or metatarsal amputations and they occurred in individuals without established peripheral vascular disease  prior to study commencement. These findings prompted the [place] Food and Drug Administration (FDA) to issue a caution on the use of canagliflozin in individuals at risk of amputation. Other SGLT2i have not been associated with an increased risk of LLA.     MUCH improvement with patient's dietary changes and meeting with Hillary (CDE) for lifestyle changes. Tran 3 also helping patient monitor his BG control    - Continue Farxiga 10 mg daily  - SGLT2i instructions provided re: surgery, times of illness/dehydration    -no strict CI For metformin if needed; patient prefers to avoid/wants few medication as possible  -Importance of better glucose control in preventing onset/progression of end-organ damage discussed, as well as goals of therapy and clinical significance of A1C.     -Surveillance for Diabetes Complications & Risks  Foot exam/neuropathy: Last Foot Exam: 06/04/24  -active wound, follows with podiatry    Retinopathy screening: Last Dilated Eye Exam: 06/20/24  Eye Exam shows Diabetic Retinopathy?: Yes       #CKD stage 3a   #Albuminuria  - taking ARB   -Continue to follow with nephrology regarding malb elevation ISO CKD/T2DM   Lab Results   Component Value Date    EGFRCR 54 (L) 12/02/2024    MICROALBCREA 3,645.7 (H) 07/15/2024      #Hypertension  - meds as below  - Patient to check blood pressure at home and keep PCP updated if blood pressure remains above goal  BP Readings from Last 1 Encounters:   01/16/25 138/80   BP Meds: amLODIPine Tabs - 10 MG; hydroCHLOROthiazide Tabs - 25 MG; valsartan Tabs - 160 MG     #Hyperlipidemia  Lab Results   Component Value Date     (H) 12/02/2024    TRIG 210 (H) 12/02/2024   Cholesterol Meds: Increased to rosuvastatin Tabs - 40 MG with goal to achieve LDL of 70 or less since patient with history of diabetes  Due for repeat lipid panel in 6 months       Return in about 6 months (around 7/16/2025).     Nery Raymundo DO   Endocrinology, Diabetes & Metabolism    1/16/2025    Note to patient: The 21 Century Cures Act makes medical notes like these available to patients in the interest of transparency. However, be advised this is a medical document. It is intended as peer to peer communication. It is written in medical language and may contain abbreviations or verbiage that are unfamiliar. It may appear blunt or direct. Medical documents are intended to carry relevant information, facts as evident, and the clinical opinion of the practitioner.

## 2025-01-16 NOTE — PATIENT INSTRUCTIONS
Summary of today's visit:  Medication changes:    Continue Farxiga   Please schedule your eye exam prior to your follow up visit and have your doctor fax the report to our office.   Tran refilled today  Increased rosuvastatin to 40 mg daily   Please alert me once your A1c results        General follow up information:  Please let us know if you require any refills at least 1 week prior to your medication running out. If you do run out of medication, please call our office ASAP to request refills (do not wait until your follow up).   Please call our office if sugars at home are consistently greater than 250 or less than 70 for medication adjustment (do not wait until your follow up appointment).  The on-call pager is for emergencies only. If you are a type 1 diabetic and run out of insulin after business hours 8AM-4PM, you may call the on-call pager for a refill to a 24 hour pharmacy. All other refill requests should be requested during business hours.       Return Visit:  [x]  APN in 3 months  [] Video visit  []  In person only  [x]  Physician in 6 months    []  Directions to 1st floor lab    []  Give blood sugar log  []  PAP paperwork for Ozempic/Jardiance (english/Turkmen)   []  Diabetes Education:    []  Carb Aware T2DM (60)   []  Carb count refresh T1DM (60)   [] CGM start _____________ (30)   []  Pump 101 (60)   []  Schedule with Thornton for ___________ (60)   []  DMBO (60)      HOW TO TREAT LOW BLOOD SUGAR (Hypoglycemia)  Low blood sugar = Less than 70, or if you start to have symptoms  Symptoms: Shaking or trembling, fast heart rate, extreme hunger, sweating, confusion/difficulty concentrating, dizziness    How to treat a low blood sugar if you are able to eat/drink: The Rule of 15/15  If you are using a continuous glucose monitor that says you are low, but you do not have any symptoms, verify on fingerstick that your blood sugar is actually <70 before treating.   Eat 15 grams of carbs (see examples  below)  Check your blood sugar after 15 minutes. If it’s still below your target range, have another serving.   Repeat these steps until sugar is >90. Once it’s in range, if you're nervous about your sugar going low again, have a protein source (ie, a spoonful of peanut butter).   If you have a CGM, you want to look for how your arrow has changed. If you arrow is pointed up or sideways after 15 min, give your CGM more time OR check with a finger stick. Try not to eat more food until at least 15 min after the first BG check - otherwise you risk spiking your sugar too high.  If you are experiencing symptoms and you are unable to check your blood glucose for any reason, treat the hypoglycemia.  If someone has a low blood sugar and is unconscious: Don’t hesitate to call 911. Use emergency glucagon. If someone is unconscious and glucagon is not available or someone does not know how to use it, call 911 immediately.     To treat a low, carry with you easy, pre-portioned treatment for low blood sugars that are 15G of carbs:   - Children sized squeeze pouch applesauce (high fiber + carbs help prevent too high of a spike)  - Small children's sized juicebox- 15g carb --> 4oz juice box  - Glucose tablets from [employer], you can find them near diabetes supplies --> Note, you will need to eat 3-4 tablets to get to 15g of carbs  - Child sized fruit snack pack- look for one with 15 grams of total carbohydrate  - Choice of how to treat your low is important. Complex carbs, or foods that contain fats along with carbs (like chocolate) can slow the absorption of glucose and should NOT be used to treat an emergency low.

## 2025-01-21 ENCOUNTER — TELEPHONE (OUTPATIENT)
Facility: CLINIC | Age: 60
End: 2025-01-21

## 2025-01-21 NOTE — TELEPHONE ENCOUNTER
Drug utilization Review regarding Patient Drug Therapy Response Form to be filled out   Placed in Provider inbox for Review

## 2025-01-24 ENCOUNTER — TELEPHONE (OUTPATIENT)
Dept: PODIATRY CLINIC | Facility: CLINIC | Age: 60
End: 2025-01-24

## 2025-01-24 NOTE — TELEPHONE ENCOUNTER
,    Patient requesting a disability form to be completed due to a surgery on 02/09/24. Patient had a vacation booked however it needed to be cancelled due to the recovery after the surgery.   Patient is requesting us to complete this form to be able to get reimbursement for the trip.  Sx: 2/9/24   Trip 03/25/24-03/31/24   Do you support?     Thank you so much,  Patricio

## 2025-01-24 NOTE — TELEPHONE ENCOUNTER
Called patient and spoke to spouse to obtain details for An Attending Physician Statement.   Spouse requesting form to be completed to submit to insurance to be able to reimburse traveling cruise ticket fee. Advised patient, provider will be tasked.   Patient had a ticket to go on a traveling cruise however due to a sx on 2/09/24 he was advised that he is unable to go. The cruise was suppose to be on March 25 till March 31st, 2024.   Patients spouse was unable to submit form sooner due to her medical reasons.   Spouse requesting form to be uploaded to [employer] message once completed.

## 2025-01-28 ENCOUNTER — NURSE ONLY (OUTPATIENT)
Facility: CLINIC | Age: 60
End: 2025-01-28
Payer: COMMERCIAL

## 2025-01-28 DIAGNOSIS — E11.69 TYPE 2 DIABETES MELLITUS WITH OTHER SPECIFIED COMPLICATION, WITHOUT LONG-TERM CURRENT USE OF INSULIN (HCC): Primary | ICD-10-CM

## 2025-01-29 ENCOUNTER — OFFICE VISIT (OUTPATIENT)
Dept: PODIATRY CLINIC | Facility: CLINIC | Age: 60
End: 2025-01-29

## 2025-01-29 DIAGNOSIS — L84 HELOMA DURUM: ICD-10-CM

## 2025-01-29 DIAGNOSIS — M20.41 HAMMER TOES OF BOTH FEET: Primary | ICD-10-CM

## 2025-01-29 DIAGNOSIS — M20.42 HAMMER TOES OF BOTH FEET: Primary | ICD-10-CM

## 2025-01-29 PROCEDURE — 99213 OFFICE O/P EST LOW 20 MIN: CPT | Performed by: PODIATRIST

## 2025-01-31 NOTE — PROGRESS NOTES
Juan James is a 59 year old male.   Chief Complaint   Patient presents with    Follow - Up     Left foot ulcer- denies pain         HPI:   This pleasant patient presents to the clinic for checkup on his fourth toe on the left foot.  At today's visit reviewed nurse's history as taken above, allergies medications and medical history as documented below.  All changes duly noted  Allergies: Patient has no known allergies.   Current Outpatient Medications   Medication Sig Dispense Refill    Continuous Glucose Sensor (FREESTYLE NIKHIL 3 PLUS SENSOR) Does not apply Misc 1 each As Directed. One sensor every 15 days. 30 day supply. 2 each 5    rosuvastatin 40 MG Oral Tab Take 1 tablet (40 mg total) by mouth nightly. 90 tablet 0    FARXIGA 10 MG Oral Tab       HYDROcodone-acetaminophen 5-325 MG Oral Tab Take 1-2 tablets by mouth every 4 (four) hours as needed for Pain. 20 tablet 0    PEG 3350-KCl-Na Bicarb-NaCl 420 g Oral Recon Soln Take as directed by physician. 4000 mL 0    mupirocin 2 % External Ointment Apply a small amount to the ulcerated site on the fourth toe once daily after bathing and cover with a Band-Aid 30 g 0    amoxicillin clavulanate 875-125 MG Oral Tab Take 1 tablet by mouth 2 (two) times daily. 20 tablet 0    Continuous Glucose  (FREESTYLE NIKHIL 3 READER) Does not apply Misc 1 each As Directed. 1 each 0    hydroCHLOROthiazide 25 MG Oral Tab Take 1 tablet (25 mg total) by mouth daily. 90 tablet 3    Dapagliflozin Propanediol (FARXIGA OR) Take 1 tablet by mouth daily.      amLODIPine 10 MG Oral Tab Take 1 tablet (10 mg total) by mouth daily.      Continuous Blood Gluc Sensor (FREESTYLE NIKHIL 14 DAY SENSOR) Does not apply Misc USE TO CHECK FASTING BLOOD SUGAR IN THE MORNING AND 2 HOURS AFTER EACH MEAL DAILY AS DIRECTED      Meloxicam 15 MG Oral Tab Take 1 tablet (15 mg total) by mouth daily.      valsartan 160 MG Oral Tab Take 2 tablets (320 mg total) by mouth daily.        Past Medical  History:    Abdominal hernia    Diabetes mellitus (HCC)    Heartburn    High blood pressure    High cholesterol    Neuropathy    feet    Other and unspecified hyperlipidemia    Pain in joints    Renal disorder    Type II or unspecified type diabetes mellitus without mention of complication, not stated as uncontrolled    Unspecified sleep apnea    NO TREATMENT    Visual impairment    glasses    Wears glasses      Past Surgical History:   Procedure Laterality Date    Colonoscopy      Hernia surgery      Other surgical history  06/2023    2nd toe of left foot amputated    Shoulder surg proc unlisted        History reviewed. No pertinent family history.   Social History     Socioeconomic History    Marital status:    Tobacco Use    Smoking status: Former     Types: Cigars, Cigarettes    Smokeless tobacco: Never   Vaping Use    Vaping status: Never Used   Substance and Sexual Activity    Alcohol use: Yes     Alcohol/week: 3.0 standard drinks of alcohol     Types: 3 Cans of beer per week     Comment: social    Drug use: No           REVIEW OF SYSTEMS:   Today reviewed systens as documented below  GENERAL HEALTH: feels well otherwise  SKIN: Refer to exam below  RESPIRATORY: denies shortness of breath with exertion  CARDIOVASCULAR: denies chest pain on exertion  GI: denies abdominal pain and denies heartburn  NEURO: denies headaches    EXAM:   There were no vitals taken for this visit.  GENERAL: well developed, well nourished, in no apparent distress  EXTREMITIES:   1. Integument: The skin of the left foot is intact incision line from his surgery is intact there is a small hyperkeratosis at the distal lateral tip of the fourth toe no ulceration noted   2. Vascular: Patient has palpable pulses   3. Neurologic: Has diminished pedal sensorium   4. Musculoskeletal: Has had a second toe amputation and partial fourth toe amputation and fusion of the first MTP joint on the left foot which remained stable.    ASSESSMENT AND  PLAN:   Diagnoses and all orders for this visit:    Hammer toes of both feet    Devyn goodman        Plan: Today's visit just trim down debrided this small hyperkeratosis at the tip of the fourth toe gave him instructions on how to care for it proper offloading.  He has no real physical restrictions right now.  He will continue in his diabetic shoes.  I will follow-up with him again in 2 months at which time we will get him back in for regular routine podiatric care sooner if required.  At today's visit I reminded the patient about daily foot checks  Reminded patient again of proper control of his diabetes to help prevent any severe complications in his feet  Proper foot hygiene moisturizing except between the toes was reviewed  Advised follow-up again every 2 to 3 months for routine care but emphasized to him the importance of coming in sooner if he notices any problems.    The patient indicates understanding of these issues and agrees to the plan.    Porfirio Felix DPM

## 2025-01-31 NOTE — TELEPHONE ENCOUNTER
Marino Felix,     Please sign off on form if you agree to:   Attending Physician Statement due to surgery.   -Signature page will be the first page scanned  -From your Inbasket, Highlight the patient and click Chart   -Double click the 01/24/25 Forms Completion telephone encounter  -Scroll down to the Media section   -Click the blue Hyperlink: Attending Physician Statement, Isidro, 1/31/25  -Click Acknowledge located in the top right ribbon/menu   -Drag the mouse into the blank space of the document and a + sign will appear. Left click to   electronically sign the document.  -Once signed, simply exit out of the screen and you signature will be saved.     Thank you kindly,   Patricio

## 2025-02-03 NOTE — PROGRESS NOTES
Juan Beltran Erika : 1965 attended [employer] [facility] Diabetes Education Class: Food Groups, Carbohydrate Counting, Label Reading    2/3/2025   Referring Provider: Brynn Ashton Start time: 5:30pm End time: 7:00pm    The patient participated during the class: Pt was able to identify sources of carbohydrates, read food labels for carb content of foods.       Healthy Eating:  Reviewed Basic Diet Guidelines as foundation of diabetes meal planning. Reinforced the balanced plate method. Taught nutrition basics defining carbohydrate, protein, and fat. Taught label reading, including an option to count carbohydrate grams or servings. Provided patient with goals for carbohydrate grams/choices for meals and snacks. Discussed sugar substitutes, alcohol and effect on blood glucose. Xavi's helpful for smart phones, as well as websites for examining carbohydrate levels provided. Reviewed and reinforced macronutrient's, carbohydrate counting and meal planning.    Problem Solving:  Reinforced signs, symptoms, and treatment of hypoglycemia using the Rule of 15.  Importance of being aware of potential for a low blood sugar when consuming alcohol  Discussed impact of different food items and portion sizes on blood glucose levels.  Discussed blood glucose target of 180 mg/dL, if testing 2 hours post meal.    Monitoring:  Reviewed blood glucose records and importance of tracking foods/blood glucose levels.    Behavior Change:  Reviewed and updated individual goals and action plan set by patient.   Addressed barriers to tracking foods/blood glucose levels and following guidelines presented/achieving goals, and setting SMART goals as a group discussion.    Recommendations:  Follow individual meal plan recommended by Educator (ANDRES).  Continue implementing individual goals.   Attend remaining class sessions.  Begin implementing carbohydrate counting and continue dietary and blood glucose tracking.     Written materials provided  for all areas covered.    Patient verbalized understanding and has no further questions currently.      Silke Collins, RD, , LD, CDCES

## 2025-03-27 ENCOUNTER — LAB ENCOUNTER (OUTPATIENT)
Dept: LAB | Age: 60
End: 2025-03-27
Attending: FAMILY MEDICINE
Payer: COMMERCIAL

## 2025-03-27 DIAGNOSIS — E78.5 HYPERLIPIDEMIA: ICD-10-CM

## 2025-03-27 DIAGNOSIS — E11.21 DIABETIC GLOMERULOPATHY (HCC): Primary | ICD-10-CM

## 2025-03-27 DIAGNOSIS — R80.9 PROTEINURIA, UNSPECIFIED TYPE: ICD-10-CM

## 2025-03-27 DIAGNOSIS — I10 ESSENTIAL HYPERTENSION: ICD-10-CM

## 2025-03-27 DIAGNOSIS — I10 ESSENTIAL HYPERTENSION, MALIGNANT: ICD-10-CM

## 2025-03-27 DIAGNOSIS — N18.30 STAGE 3 CHRONIC KIDNEY DISEASE, UNSPECIFIED WHETHER STAGE 3A OR 3B CKD (HCC): ICD-10-CM

## 2025-03-27 LAB
ALBUMIN SERPL-MCNC: 4.3 G/DL (ref 3.2–4.8)
ALBUMIN/GLOB SERPL: 1.5 {RATIO} (ref 1–2)
ALP LIVER SERPL-CCNC: 74 U/L
ALT SERPL-CCNC: 27 U/L
ANION GAP SERPL CALC-SCNC: 5 MMOL/L (ref 0–18)
AST SERPL-CCNC: 18 U/L (ref ?–34)
BASOPHILS # BLD AUTO: 0.04 X10(3) UL (ref 0–0.2)
BASOPHILS NFR BLD AUTO: 0.7 %
BILIRUB SERPL-MCNC: 0.3 MG/DL (ref 0.3–1.2)
BILIRUB UR QL STRIP.AUTO: NEGATIVE
BUN BLD-MCNC: 40 MG/DL (ref 9–23)
CALCIUM BLD-MCNC: 10 MG/DL (ref 8.7–10.6)
CHLORIDE SERPL-SCNC: 109 MMOL/L (ref 98–112)
CHOLEST SERPL-MCNC: 169 MG/DL (ref ?–200)
CLARITY UR REFRACT.AUTO: CLEAR
CO2 SERPL-SCNC: 26 MMOL/L (ref 21–32)
CREAT BLD-MCNC: 1.7 MG/DL
CREAT UR-SCNC: 59.3 MG/DL
CREAT UR-SCNC: 59.3 MG/DL
EGFRCR SERPLBLD CKD-EPI 2021: 46 ML/MIN/1.73M2 (ref 60–?)
EOSINOPHIL # BLD AUTO: 0.15 X10(3) UL (ref 0–0.7)
EOSINOPHIL NFR BLD AUTO: 2.5 %
ERYTHROCYTE [DISTWIDTH] IN BLOOD BY AUTOMATED COUNT: 12.7 %
EST. AVERAGE GLUCOSE BLD GHB EST-MCNC: 166 MG/DL (ref 68–126)
FASTING PATIENT LIPID ANSWER: YES
FASTING STATUS PATIENT QL REPORTED: YES
GLOBULIN PLAS-MCNC: 2.8 G/DL (ref 2–3.5)
GLUCOSE BLD-MCNC: 172 MG/DL (ref 70–99)
GLUCOSE UR STRIP.AUTO-MCNC: >1000 MG/DL
HBA1C MFR BLD: 7.4 % (ref ?–5.7)
HCT VFR BLD AUTO: 35.1 %
HDLC SERPL-MCNC: 35 MG/DL (ref 40–59)
HGB BLD-MCNC: 11 G/DL
HYALINE CASTS #/AREA URNS AUTO: PRESENT /LPF
IMM GRANULOCYTES # BLD AUTO: 0.01 X10(3) UL (ref 0–1)
IMM GRANULOCYTES NFR BLD: 0.2 %
KETONES UR STRIP.AUTO-MCNC: NEGATIVE MG/DL
LDLC SERPL CALC-MCNC: 101 MG/DL (ref ?–100)
LEUKOCYTE ESTERASE UR QL STRIP.AUTO: NEGATIVE
LYMPHOCYTES # BLD AUTO: 1.29 X10(3) UL (ref 1–4)
LYMPHOCYTES NFR BLD AUTO: 21.3 %
MCH RBC QN AUTO: 29 PG (ref 26–34)
MCHC RBC AUTO-ENTMCNC: 31.3 G/DL (ref 31–37)
MCV RBC AUTO: 92.6 FL
MICROALBUMIN UR-MCNC: 113.2 MG/DL
MICROALBUMIN/CREAT 24H UR-RTO: 1908.9 UG/MG (ref ?–30)
MONOCYTES # BLD AUTO: 0.42 X10(3) UL (ref 0.1–1)
MONOCYTES NFR BLD AUTO: 6.9 %
NEUTROPHILS # BLD AUTO: 4.15 X10 (3) UL (ref 1.5–7.7)
NEUTROPHILS # BLD AUTO: 4.15 X10(3) UL (ref 1.5–7.7)
NEUTROPHILS NFR BLD AUTO: 68.4 %
NITRITE UR QL STRIP.AUTO: NEGATIVE
NONHDLC SERPL-MCNC: 134 MG/DL (ref ?–130)
OSMOLALITY SERPL CALC.SUM OF ELEC: 304 MOSM/KG (ref 275–295)
PH UR STRIP.AUTO: 6.5 [PH] (ref 5–8)
PLATELET # BLD AUTO: 164 10(3)UL (ref 150–450)
POTASSIUM SERPL-SCNC: 5.6 MMOL/L (ref 3.5–5.1)
PROT SERPL-MCNC: 7.1 G/DL (ref 5.7–8.2)
PROT UR STRIP.AUTO-MCNC: 100 MG/DL
RBC # BLD AUTO: 3.79 X10(6)UL
RBC UR QL AUTO: NEGATIVE
SODIUM SERPL-SCNC: 140 MMOL/L (ref 136–145)
SP GR UR STRIP.AUTO: 1.01 (ref 1–1.03)
TRIGL SERPL-MCNC: 189 MG/DL (ref 30–149)
UROBILINOGEN UR STRIP.AUTO-MCNC: NORMAL MG/DL
VLDLC SERPL CALC-MCNC: 32 MG/DL (ref 0–30)
WBC # BLD AUTO: 6.1 X10(3) UL (ref 4–11)

## 2025-03-27 PROCEDURE — 80053 COMPREHEN METABOLIC PANEL: CPT

## 2025-03-27 PROCEDURE — 83036 HEMOGLOBIN GLYCOSYLATED A1C: CPT

## 2025-03-27 PROCEDURE — 80061 LIPID PANEL: CPT

## 2025-03-27 PROCEDURE — 82570 ASSAY OF URINE CREATININE: CPT

## 2025-03-27 PROCEDURE — 81001 URINALYSIS AUTO W/SCOPE: CPT

## 2025-03-27 PROCEDURE — 85025 COMPLETE CBC W/AUTO DIFF WBC: CPT

## 2025-03-27 PROCEDURE — 36415 COLL VENOUS BLD VENIPUNCTURE: CPT

## 2025-03-27 PROCEDURE — 82043 UR ALBUMIN QUANTITATIVE: CPT

## 2025-03-27 PROCEDURE — 80326 AMPHETAMINES 5 OR MORE: CPT

## 2025-04-02 ENCOUNTER — OFFICE VISIT (OUTPATIENT)
Dept: PODIATRY CLINIC | Facility: CLINIC | Age: 60
End: 2025-04-02
Payer: COMMERCIAL

## 2025-04-02 DIAGNOSIS — M20.42 HAMMER TOE OF LEFT FOOT: ICD-10-CM

## 2025-04-02 DIAGNOSIS — M20.41 HAMMER TOES OF BOTH FEET: Primary | ICD-10-CM

## 2025-04-02 DIAGNOSIS — E11.42 DIABETIC PERIPHERAL NEUROPATHY (HCC): ICD-10-CM

## 2025-04-02 DIAGNOSIS — Z89.422: ICD-10-CM

## 2025-04-02 DIAGNOSIS — L84 HELOMA DURUM: ICD-10-CM

## 2025-04-02 DIAGNOSIS — M20.42 HAMMER TOES OF BOTH FEET: Primary | ICD-10-CM

## 2025-04-02 DIAGNOSIS — B35.1 ONYCHOMYCOSIS: ICD-10-CM

## 2025-04-02 PROCEDURE — 99213 OFFICE O/P EST LOW 20 MIN: CPT | Performed by: PODIATRIST

## 2025-04-03 ENCOUNTER — TELEPHONE (OUTPATIENT)
Facility: CLINIC | Age: 60
End: 2025-04-03

## 2025-04-03 LAB — Lab: <1 NG/ML

## 2025-04-03 NOTE — TELEPHONE ENCOUNTER
Fax from [facility]  Statement of Certifying Physician Form Verification    Placed in Provider inbox

## 2025-04-06 NOTE — PROGRESS NOTES
Juan James is a 59 year old male.   Chief Complaint   Patient presents with    Follow - Up     Left foot ulcer f/u-  Denies any pain          HPI:   Returns to the clinic for checkup on his left foot he is not having any pain.  He presents today for evaluation and management of his feet routine diabetic footcare.  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  At today's visit reviewed nurse's history as taken above, allergies medications and medical history as documented below.  All changes duly noted  Allergies: Patient has no known allergies.   Current Outpatient Medications   Medication Sig Dispense Refill    Continuous Glucose Sensor (FREESTYLE NIKHIL 3 PLUS SENSOR) Does not apply Misc 1 each As Directed. One sensor every 15 days. 30 day supply. 2 each 5    rosuvastatin 40 MG Oral Tab Take 1 tablet (40 mg total) by mouth nightly. 90 tablet 0    FARXIGA 10 MG Oral Tab       HYDROcodone-acetaminophen 5-325 MG Oral Tab Take 1-2 tablets by mouth every 4 (four) hours as needed for Pain. 20 tablet 0    PEG 3350-KCl-Na Bicarb-NaCl 420 g Oral Recon Soln Take as directed by physician. 4000 mL 0    mupirocin 2 % External Ointment Apply a small amount to the ulcerated site on the fourth toe once daily after bathing and cover with a Band-Aid 30 g 0    amoxicillin clavulanate 875-125 MG Oral Tab Take 1 tablet by mouth 2 (two) times daily. 20 tablet 0    Continuous Glucose  (FREESTYLE NIKHIL 3 READER) Does not apply Misc 1 each As Directed. 1 each 0    hydroCHLOROthiazide 25 MG Oral Tab Take 1 tablet (25 mg total) by mouth daily. 90 tablet 3    Dapagliflozin Propanediol (FARXIGA OR) Take 1 tablet by mouth daily.      amLODIPine 10 MG Oral Tab Take 1 tablet (10 mg total) by mouth daily.      Continuous Blood Gluc Sensor (FREESTYLE NIKHIL 14 DAY SENSOR) Does not apply Misc USE TO CHECK FASTING BLOOD SUGAR IN THE MORNING AND 2 HOURS AFTER EACH MEAL DAILY AS DIRECTED      Meloxicam 15 MG Oral Tab  Take 1 tablet (15 mg total) by mouth daily.      valsartan 160 MG Oral Tab Take 2 tablets (320 mg total) by mouth daily.        Past Medical History:    Abdominal hernia    Diabetes mellitus (HCC)    Heartburn    High blood pressure    High cholesterol    Neuropathy    feet    Other and unspecified hyperlipidemia    Pain in joints    Renal disorder    Type II or unspecified type diabetes mellitus without mention of complication, not stated as uncontrolled    Unspecified sleep apnea    NO TREATMENT    Visual impairment    glasses    Wears glasses      Past Surgical History:   Procedure Laterality Date    Colonoscopy      Hernia surgery      Other surgical history  06/2023    2nd toe of left foot amputated    Shoulder surg proc unlisted        History reviewed. No pertinent family history.   Social History     Socioeconomic History    Marital status:    Tobacco Use    Smoking status: Former     Types: Cigars, Cigarettes    Smokeless tobacco: Never   Vaping Use    Vaping status: Never Used   Substance and Sexual Activity    Alcohol use: Yes     Alcohol/week: 3.0 standard drinks of alcohol     Types: 3 Cans of beer per week     Comment: social    Drug use: No           REVIEW OF SYSTEMS:   Today reviewed systens as documented below  GENERAL HEALTH: feels well otherwise  SKIN: Refer to exam below  RESPIRATORY: denies shortness of breath with exertion  CARDIOVASCULAR: denies chest pain on exertion  GI: denies abdominal pain and denies heartburn  NEURO: denies headaches    EXAM:   There were no vitals taken for this visit.  GENERAL: well developed, well nourished, in no apparent distress  EXTREMITIES:   1. Integument: Skin on his feet is warm and dry his toenails 1-5 on the right are thickened yellow dystrophic 1, 3 and 5 on the left are similar.  There are no open sores or lesions noted he has surgical scars over his first metatarsal phalangeal joint on the left, where the second toe was amputated on the left and  where a partial amputation was performed on the fourth toe of the left.  All areas remain well-healed no sign of infection.   2. Vascular: Patient has palpable pulses   3. Neurologic: Patient has diminished pedal sensorium is lost protective sensation has a history of diabetes with neuropathy and amputation.   4. Musculoskeletal: Patient has good muscle strength he has amputations as documented above.  Has had a first MTP joint fusion of the left foot.    ASSESSMENT AND PLAN:   Diagnoses and all orders for this visit:    Hammer toes of both feet  -     DME - External    Heloma durum  -     DME - External    Diabetic peripheral neuropathy (HCC)  -     DME - External    History of amputation of left second toe (HCC)  -     DME - External    Onychomycosis    Hammer toe of left foot        Plan: Today using a nail nippers were trimmed and debrided toenails as documented above totaling 8 toenails manually and mechanically in girth and width as far down to healthy tissue as possible on both feet.  This was done uneventfully there was no hemorrhage.  There is mild incurvation of the medial and lateral nail borders of the hallux which using a slant back technique were removed and they would not get ingrown.  At today's visit I reminded the patient about daily foot checks  Reminded patient again of proper control of his diabetes to help prevent any severe complications in his feet  Proper foot hygiene moisturizing except between the toes was reviewed  Advised follow-up again every 2 to 3 months for routine care but emphasized to him the importance of coming in sooner if he notices any problems.  Patient's shoes are now about 2 years old dispensed a new prescription to get him a new pair of diabetic shoes and inserts.    The patient indicates understanding of these issues and agrees to the plan.    Porfirio Felix DPM

## 2025-04-24 ENCOUNTER — PATIENT MESSAGE (OUTPATIENT)
Facility: CLINIC | Age: 60
End: 2025-04-24

## 2025-04-24 DIAGNOSIS — E11.69 TYPE 2 DIABETES MELLITUS WITH OTHER SPECIFIED COMPLICATION, WITHOUT LONG-TERM CURRENT USE OF INSULIN (HCC): ICD-10-CM

## 2025-04-24 RX ORDER — HYDROCHLOROTHIAZIDE 12.5 MG/1
1 CAPSULE ORAL AS DIRECTED
Qty: 6 EACH | Refills: 1 | Status: SHIPPED | OUTPATIENT
Start: 2025-04-24

## 2025-04-24 NOTE — TELEPHONE ENCOUNTER
Endocrine Refill protocol for CGM supplies     Protocol Criteria:  PASSED Reason: N/A    If below requirement is met, send a 90-day supply with 1 refill per provider protocol.     Verify appointment with Endocrinology completed in the last 12 months or scheduled in the next 6 months     Last completed office visit:1/16/2025 Nery Raymundo DO   Next scheduled Follow up:   Future Appointments   Date Time Provider Department Center   5/7/2025  3:00 PM Imtiaz Keyes MD [facility]   5/8/2025  3:30 PM Brynn Ashton APN [facility]   6/2/2025  3:00 PM Porfirio Felix DPM [id number]   7/24/2025  3:00 PM Nery Raymundo DO [id number] [facility]     Sent per protocol.

## 2025-05-07 ENCOUNTER — OFFICE VISIT (OUTPATIENT)
Dept: NEPHROLOGY | Facility: CLINIC | Age: 60
End: 2025-05-07
Payer: COMMERCIAL

## 2025-05-07 VITALS — DIASTOLIC BLOOD PRESSURE: 82 MMHG | SYSTOLIC BLOOD PRESSURE: 160 MMHG | WEIGHT: 243.13 LBS | BODY MASS INDEX: 33 KG/M2

## 2025-05-07 DIAGNOSIS — I10 ESSENTIAL HYPERTENSION: ICD-10-CM

## 2025-05-07 DIAGNOSIS — N18.31 STAGE 3A CHRONIC KIDNEY DISEASE (HCC): Primary | ICD-10-CM

## 2025-05-07 PROCEDURE — 3051F HG A1C>EQUAL 7.0%<8.0%: CPT | Performed by: INTERNAL MEDICINE

## 2025-05-07 PROCEDURE — 3079F DIAST BP 80-89 MM HG: CPT | Performed by: INTERNAL MEDICINE

## 2025-05-07 PROCEDURE — 3060F POS MICROALBUMINURIA REV: CPT | Performed by: INTERNAL MEDICINE

## 2025-05-07 PROCEDURE — 99214 OFFICE O/P EST MOD 30 MIN: CPT | Performed by: INTERNAL MEDICINE

## 2025-05-07 PROCEDURE — 3077F SYST BP >= 140 MM HG: CPT | Performed by: INTERNAL MEDICINE

## 2025-05-07 NOTE — PROGRESS NOTES
Nephrology Progress Note      Juan James is a 59 year old male.    HPI:     Chief Complaint   Patient presents with    Chronic Kidney Disease    Hypertension    Proteinuria       Mr. James was seen in the nephrology clinic today in follow-up for management of chronic kidney disease stage IIIa and proteinuria in the setting of diabetes and hypertension.  Since his last clinic visit in November he reports that overall he has been generally in good health and has not had any recent illnesses or hospitalizations.  Blood pressures are typically 130-140/80 or so slightly higher than in the office today.  Hemoglobin A1c was noted to be 7.4%.  Review of systems is otherwise unremarkable.      HISTORY:  Past Medical History[1]   Past Surgical History[2]   Family History[3]   Social History: Short Social Hx on File[4]     Medications (Active prior to today's visit):  Current Medications[5]    Allergies:  Allergies[6]      ROS:     Denies fever/chills  Denies wt loss/gain  Denies HA or visual changes  Denies CP or palpitations  Denies SOB/cough/hemoptysis  Denies abd or flank pain  Denies N/V/D  Denies change in urinary habits or gross hematuria  Denies LE edema  Denies skin rashes/myalgias/arthralgias      PHYSICAL EXAM:   Wt 243 lb 2 oz (110.3 kg)   BMI 32.97 kg/m²   Wt Readings from Last 6 Encounters:   05/07/25 243 lb 2 oz (110.3 kg)   11/06/24 230 lb 8 oz (104.6 kg)   11/04/24 233 lb (105.7 kg)   10/31/24 230 lb (104.3 kg)   10/16/24 233 lb (105.7 kg)   10/16/24 230 lb (104.3 kg)       General: Alert and oriented in no apparent distress.  HEENT: No scleral icterus, MMM  Neck: Supple, no REJI or thyromegaly  Cardiac: Regular rate and rhythm, S1, S2 normal, no murmur or rub  Lungs: Clear without wheezes, rales, rhonchi.    Extremities: Without clubbing, cyanosis or edema.  Neurologic: Alert and oriented, normal affect, cranial nerves grossly intact, moving all extremities  Skin: Warm and dry, no  simin      LABS:     Lab Results   Component Value Date    BUN 40 (H) 03/27/2025    BUNCREA 20.8 (H) 05/14/2019    CREATSERUM 1.70 (H) 03/27/2025    ANIONGAP 5 03/27/2025    GFRNAA 104 05/14/2019    GFRAA 120 05/14/2019    CA 10.0 03/27/2025    OSMOCALC 304 (H) 03/27/2025    ALKPHO 74 03/27/2025    AST 18 03/27/2025    ALT 27 03/27/2025    BILT 0.3 03/27/2025    TP 7.1 03/27/2025    ALB 4.3 03/27/2025    GLOBULIN 2.8 03/27/2025     03/27/2025    K 5.6 (H) 03/27/2025     03/27/2025    CO2 26.0 03/27/2025     Lab Results   Component Value Date    RBC 3.79 (L) 03/27/2025    HGB 11.0 (L) 03/27/2025    HCT 35.1 (L) 03/27/2025    .0 03/27/2025    MCV 92.6 03/27/2025    MCH 29.0 03/27/2025    MCHC 31.3 03/27/2025    RDW 12.7 03/27/2025    NEPRELIM 4.15 03/27/2025    NEPERCENT 68.4 03/27/2025    LYPERCENT 21.3 03/27/2025    MOPERCENT 6.9 03/27/2025    EOPERCENT 2.5 03/27/2025    BAPERCENT 0.7 03/27/2025    NE 4.15 03/27/2025    LYMABS 1.29 03/27/2025    MOABSO 0.42 03/27/2025    EOABSO 0.15 03/27/2025    BAABSO 0.04 03/27/2025     Lab Results   Component Value Date    CREUR 59.30 03/27/2025    CREUR 59.30 03/27/2025     Lab Results   Component Value Date    CLARITY Clear 03/27/2025    SPECGRAVITY 1.013 03/27/2025    GLUUR >1000 (A) 03/27/2025    BILUR Negative 03/27/2025    KETUR Negative 03/27/2025    BLOODURINE Negative 03/27/2025    PHURINE 6.5 03/27/2025    PROUR 100 (A) 03/27/2025    UROBILINOGEN Normal 03/27/2025    NITRITE Negative 03/27/2025    LEUUR Negative 03/27/2025    WBCUR 1-5 03/27/2025    RBCUR 0-2 03/27/2025    EPIUR None Seen 03/27/2025    BACUR None Seen 03/27/2025    HYLUR Present (A) 03/27/2025     ASSESSMENT/PLAN:     1.  Chronic kidney disease stage IIIa-this is longstanding in the setting of biopsy-proven diabetic nodular glomerulosclerosis as well as changes consistent with hypertensive nephrosclerosis.  Most recent creatinine was 1.7 mg/dL which is stable at his long-term  baseline.  We have continued discussed good blood glucose control and maintaining his kidney health over time as well as ongoing good blood pressure control including use of medications which block the renin-angiotensin system.  He is on losartan and blood pressure generally stable at home.  In addition to the above he is on Farxiga which will provide renal protection of metformin as well    #2.  Hypertension-blood pressure is elevated in the office today although his home blood pressures are stable.  He will continue his current doses of amlodipine, valsartan and hydrochlorothiazide    Thank you again for allowing me to precipitate care of your patient.  Please do not hesitate to call with any questions or concerns.    Imtiaz Keyes MD  5/7/2025  3:07 PM             [1]   Past Medical History:   Abdominal hernia    Diabetes mellitus (HCC)    Heartburn    High blood pressure    High cholesterol    Neuropathy    feet    Other and unspecified hyperlipidemia    Pain in joints    Renal disorder    Type II or unspecified type diabetes mellitus without mention of complication, not stated as uncontrolled    Unspecified sleep apnea    NO TREATMENT    Visual impairment    glasses    Wears glasses   [2]   Past Surgical History:  Procedure Laterality Date    Colonoscopy      Hernia surgery      Other surgical history  06/2023    2nd toe of left foot amputated    Shoulder surg proc unlisted     [3] History reviewed. No pertinent family history.  [4]   Social History  Socioeconomic History    Marital status:    Tobacco Use    Smoking status: Former     Types: Cigars, Cigarettes    Smokeless tobacco: Never   Vaping Use    Vaping status: Never Used   Substance and Sexual Activity    Alcohol use: Yes     Alcohol/week: 3.0 standard drinks of alcohol     Types: 3 Cans of beer per week     Comment: social    Drug use: No     Social Drivers of Health      Received from [facility]   [5]   Current Outpatient  Medications   Medication Sig Dispense Refill    Continuous Glucose Sensor (FREESTYLE NIKHIL 3 PLUS SENSOR) Does not apply Misc 1 each As Directed. One sensor every 15 days. 30 day supply. 6 each 1    rosuvastatin 40 MG Oral Tab Take 1 tablet (40 mg total) by mouth nightly. 90 tablet 0    FARXIGA 10 MG Oral Tab       HYDROcodone-acetaminophen 5-325 MG Oral Tab Take 1-2 tablets by mouth every 4 (four) hours as needed for Pain. 20 tablet 0    PEG 3350-KCl-Na Bicarb-NaCl 420 g Oral Recon Soln Take as directed by physician. 4000 mL 0    mupirocin 2 % External Ointment Apply a small amount to the ulcerated site on the fourth toe once daily after bathing and cover with a Band-Aid 30 g 0    amoxicillin clavulanate 875-125 MG Oral Tab Take 1 tablet by mouth 2 (two) times daily. 20 tablet 0    Continuous Glucose  (FREESTYLE NIKHIL 3 READER) Does not apply Misc 1 each As Directed. 1 each 0    hydroCHLOROthiazide 25 MG Oral Tab Take 1 tablet (25 mg total) by mouth daily. 90 tablet 3    Dapagliflozin Propanediol (FARXIGA OR) Take 1 tablet by mouth daily.      amLODIPine 10 MG Oral Tab Take 1 tablet (10 mg total) by mouth daily.      Continuous Blood Gluc Sensor (FREESTYLE NIKHIL 14 DAY SENSOR) Does not apply Misc USE TO CHECK FASTING BLOOD SUGAR IN THE MORNING AND 2 HOURS AFTER EACH MEAL DAILY AS DIRECTED      Meloxicam 15 MG Oral Tab Take 1 tablet (15 mg total) by mouth daily.      valsartan 160 MG Oral Tab Take 2 tablets (320 mg total) by mouth daily.     [6] No Known Allergies

## 2025-05-08 ENCOUNTER — OFFICE VISIT (OUTPATIENT)
Facility: CLINIC | Age: 60
End: 2025-05-08
Payer: COMMERCIAL

## 2025-05-08 VITALS
DIASTOLIC BLOOD PRESSURE: 72 MMHG | SYSTOLIC BLOOD PRESSURE: 130 MMHG | OXYGEN SATURATION: 96 % | HEART RATE: 74 BPM | WEIGHT: 242 LBS | BODY MASS INDEX: 33 KG/M2

## 2025-05-08 DIAGNOSIS — E11.59 HYPERTENSION ASSOCIATED WITH TYPE 2 DIABETES MELLITUS (HCC): ICD-10-CM

## 2025-05-08 DIAGNOSIS — I15.2 HYPERTENSION ASSOCIATED WITH TYPE 2 DIABETES MELLITUS (HCC): ICD-10-CM

## 2025-05-08 DIAGNOSIS — E11.21 MICROALBUMINURIC DIABETIC NEPHROPATHY (HCC): ICD-10-CM

## 2025-05-08 DIAGNOSIS — E11.69 HYPERLIPIDEMIA ASSOCIATED WITH TYPE 2 DIABETES MELLITUS (HCC): ICD-10-CM

## 2025-05-08 DIAGNOSIS — E78.5 HYPERLIPIDEMIA ASSOCIATED WITH TYPE 2 DIABETES MELLITUS (HCC): ICD-10-CM

## 2025-05-08 DIAGNOSIS — E66.811 CLASS 1 OBESITY DUE TO EXCESS CALORIES WITH SERIOUS COMORBIDITY AND BODY MASS INDEX (BMI) OF 32.0 TO 32.9 IN ADULT: ICD-10-CM

## 2025-05-08 DIAGNOSIS — E11.69 TYPE 2 DIABETES MELLITUS WITH OTHER SPECIFIED COMPLICATION, WITHOUT LONG-TERM CURRENT USE OF INSULIN (HCC): Primary | ICD-10-CM

## 2025-05-08 DIAGNOSIS — E66.09 CLASS 1 OBESITY DUE TO EXCESS CALORIES WITH SERIOUS COMORBIDITY AND BODY MASS INDEX (BMI) OF 32.0 TO 32.9 IN ADULT: ICD-10-CM

## 2025-05-08 DIAGNOSIS — N18.31 STAGE 3A CHRONIC KIDNEY DISEASE (HCC): ICD-10-CM

## 2025-05-08 PROCEDURE — 3051F HG A1C>EQUAL 7.0%<8.0%: CPT

## 2025-05-08 PROCEDURE — 3060F POS MICROALBUMINURIA REV: CPT

## 2025-05-08 PROCEDURE — 95251 CONT GLUC MNTR ANALYSIS I&R: CPT

## 2025-05-08 PROCEDURE — 3072F LOW RISK FOR RETINOPATHY: CPT

## 2025-05-08 PROCEDURE — 3075F SYST BP GE 130 - 139MM HG: CPT

## 2025-05-08 PROCEDURE — 3078F DIAST BP <80 MM HG: CPT

## 2025-05-08 PROCEDURE — 99215 OFFICE O/P EST HI 40 MIN: CPT

## 2025-05-08 NOTE — PATIENT INSTRUCTIONS
Return Visit:   Return in about 2 months (around 7/8/2025) for scheduled follow up with Dr. Raymundo.      - I will check in with Dr Kenny and then notify you about the ozempic     - like we discussed- you do have chronic kidney disease. Your losartan (helps with blood pressure) and farxiga are helpful to prevent progression. I would like to see your A1C <7% thus I am recommending ozempic start. Ozempic is also now proven to help with CKD (see below)    - Ozempic® is now indicated to reduce the risk of kidney disease worsening, kidney failure, and death from cardiovascular disease in adults with type 2 diabetes and chronic kidney disease  - The approval is based on the results of the pivotal FLOW phase 3b kidney outcomes trial and addresses a critical need for adults with type 2 diabetes living with this common comorbidity of chronic kidney disease   -The FLOW trial achieved its primary endpoint with Ozempic® 1 mg, demonstrating a statistically significant and superior 24% relative risk reduction of kidney disease worsening, kidney failure (end-stage kidney disease), and death due to cardiovascular disease (4.9% absolute risk reduction at 3 years) compared to placebo, when added to standard of care.  - FLOW trial link: https://www.nejm.org/doi/full/10.1056/OTOUkm6098961     Latest Reference Range & Units 02/02/24 14:45 04/01/24 07:34 07/15/24 16:13 08/24/24 09:01 12/02/24 07:19 03/27/25 07:39   EGFR >=60 mL/min/1.73m2   50 (L) 50 (L) 54 (L) 46 (L)   CREATININE UR RANDOM mg/dL  mg/dL 85.70 78.10 68.30   59.30  59.30   MALB URINE mg/dL 234.00 168.40 249.00   113.20   MALB/CRE CALC <=30.0 ug/mg 2,730.5 (H) 2,156.2 (H) 3,645.7 (H)   1,908.9 (H)   (L): Data is abnormally low  (H): Data is abnormally high      Updated diabetes medication instructions from today:   Diabetic Medications              FARXIGA 10 MG Oral Tab             Lab Results   Component Value Date    A1C 7.4 (H) 03/27/2025    A1C 7.0 (H) 12/02/2024     A1C 6.4 (A) 10/16/2024    A1C 7.4 (H) 08/24/2024    A1C 7.0 (A) 06/04/2024        General follow up information:  Please let us know if you require any refills at least 1 week prior to your medication running out. If you do run out of medication, please call our office ASAP to request refills (do not wait until your follow up).   Please call our office if sugars at home are consistently greater than 250 or less than 70 for medication adjustment (do not wait until your follow up appointment).  Lab results and imaging will typically be reviewed at follow up appointments, or within 3-5 business days of ALL results being in if you do not have an appointment scheduled in the near future. Our office will contact you for any abnormal results requiring more urgent follow up or action.   The on-call pager is for urgent matters only. If you are a type 1 diabetic and run out of insulin after business hours 8AM-4PM, you may call the on-call pager for a refill to a 24 hour pharmacy.  If you have adrenal insufficiency and run out of steroids, you may call the on-call pager for a refill to a 24 hour pharmacy. All other refill requests should be requested during business hours.    Office phone number: [phone]; phones are open Monday-Friday 8:30-4:30.       HOW TO TREAT LOW BLOOD SUGAR (Hypoglycemia)  Low blood sugar= Less than 70, or if you start to have symptoms (below)  Symptoms: Shaking or trembling, fast heart rate, extreme hunger, sweating, confusion/difficulty concentrating, dizziness.    How to treat a low blood sugar if you are able to eat/drink: The Rule of 15/15  If you are using continuous glucose monitor that says you are low, but you do not have any symptoms, verify on fingerstick that your blood sugar is actually low before treating.   Eat 15 grams of carbs (see examples below)  Check your blood sugar after 15 minutes. If it’s still below your target range, have another serving.   Repeat these steps until it’s in  your target range. Once it’s in range, if you're nervous about your sugar going low again, have a protein source (ie, a spoonful of peanut butter).   If you have a CGM you want to look for how your arrow has changed. If you arrow is pointed up or sideways after 15 min, give your CGM more time OR check with a finger stick. Try not to eat more food until at least 15 min after the first BG check - otherwise you risk having a rebound high.  If you are experiencing symptoms and you are unable to check your blood glucose for any reason, treat the hypoglycemia.  If someone has a low blood sugar and is unconscious: Don’t hesitate to call 911. If someone is unconscious and glucagon is not available or someone does not know how to use it, call 911 immediately.     To treat a low, I recommend you carry with you easy, pre-portioned treatment for low blood sugars that are 15G of carbs:   - Children sized squeeze pouch applesauce (high fiber + carbs help prevent too high of a spike)  - Small children's sized juicebox- 15g carb --> 4oz juice box  - Glucose tablets from [email or web address], you can find them near diabetes supplies --> Note, you will need to eat 3-4 tablets to get to 15g of carbs  - Children sized fruit snack pack- look for one with 15 grams of total carbohydrate  - Choice of how to treat your low is important. Complex carbs, or foods that contain fats along with carbs (like chocolate) can slow the absorption of glucose and should NOT be used to treat an emergency low     Pt p/w mild oropharyngeal dysphagia c/b prolonged mastication and formation of bolus, delayed A-P transport, oral stasis, multiple swallows palpated. No overt s/s of aspiration observed.

## 2025-05-08 NOTE — PROGRESS NOTES
[facility] Note    Name: Juan James    Date: 5/8/2025    Chief complaint: Follow - Up (Pt presents for DM follow up. No new concerns. No refills needed at this time. )       Subjective:    Initial HPI consult in October 2023  Juan James is a 59 year old male who presents for evaluation of T2DM management. A1C 7.0 12/2023    DM hx:  -Diagnosed with diabetes in 2019  -Re: potential DM medication contraindication: denies history of pancreatitis, personal/fam hx of medullary thyroid ca/MEN2, UTI/yeast infxn, gastroparesis  -Presence of associated DM complications (if positive, checkbox selected):  [] Macrovascular complications (CAD/CVA/PAD)  [] Neuropathy  [] Retinopathy  [x] nephropathy with positive urine microalbumin  [x] HTN  [] Hyperlipidemia    DM meds at first office visit: Farxiga 10 mg daily  Previously trialed/failed DM meds: metformin but was discontinued, unsure why     Interval history  5/8/2025-w/ Kaylene KELLEY. Last A1c value was 7.4% done 3/27/2025.  Last office visit, continued meds as below   Still using mary jane- takes breaks occasionally from it due to high cost  Saw nephrology yesterday   Continues to try maintaining low carb diet, walking around at work at [employer]      DM meds at visit:   Diabetic Medications              FARXIGA 10 MG Oral Tab             Continuous Glucose Monitoring Interpretation -- not enough data to provide full interpretation             History/Other:    Allergies, PMH, SocHx and FHx reviewed and updated as appropriate in Epic on   No outpatient medications have been marked as taking for the 5/8/25 encounter (Office Visit) with Brynn Ashton APN.     No Known Allergies  Current Outpatient Medications   Medication Sig Dispense Refill    Continuous Glucose Sensor (FREESTYLE MARY JANE 3 PLUS SENSOR) Does not apply Misc 1 each As Directed. One sensor every 15 days. 30 day supply. 6 each 1    rosuvastatin 40 MG Oral Tab Take 1 tablet (40 mg  total) by mouth nightly. 90 tablet 0    FARXIGA 10 MG Oral Tab       HYDROcodone-acetaminophen 5-325 MG Oral Tab Take 1-2 tablets by mouth every 4 (four) hours as needed for Pain. 20 tablet 0    PEG 3350-KCl-Na Bicarb-NaCl 420 g Oral Recon Soln Take as directed by physician. 4000 mL 0    mupirocin 2 % External Ointment Apply a small amount to the ulcerated site on the fourth toe once daily after bathing and cover with a Band-Aid 30 g 0    amoxicillin clavulanate 875-125 MG Oral Tab Take 1 tablet by mouth 2 (two) times daily. 20 tablet 0    Continuous Glucose  (FREESTYLE NIKHIL 3 READER) Does not apply Misc 1 each As Directed. 1 each 0    hydroCHLOROthiazide 25 MG Oral Tab Take 1 tablet (25 mg total) by mouth daily. 90 tablet 3    amLODIPine 10 MG Oral Tab Take 1 tablet (10 mg total) by mouth daily.      Meloxicam 15 MG Oral Tab Take 1 tablet (15 mg total) by mouth daily.      valsartan 160 MG Oral Tab Take 2 tablets (320 mg total) by mouth daily.       Past Medical History:    Abdominal hernia    Diabetes mellitus (HCC)    Heartburn    High blood pressure    High cholesterol    Neuropathy    feet    Other and unspecified hyperlipidemia    Pain in joints    Renal disorder    Type II or unspecified type diabetes mellitus without mention of complication, not stated as uncontrolled    Unspecified sleep apnea    NO TREATMENT    Visual impairment    glasses    Wears glasses     Past Surgical History:   Procedure Laterality Date    Colonoscopy      Hernia surgery      Other surgical history  06/2023    2nd toe of left foot amputated    Shoulder surg proc unlisted       Social History     Socioeconomic History    Marital status:    Tobacco Use    Smoking status: Former     Types: Cigars, Cigarettes    Smokeless tobacco: Never   Vaping Use    Vaping status: Never Used   Substance and Sexual Activity    Alcohol use: Yes     Alcohol/week: 3.0 standard drinks of alcohol     Types: 3 Cans of beer per week      Comment: social    Drug use: No     Social Drivers of Health      Received from [facility]     History reviewed. No pertinent family history.    ROS/Exam    REVIEW OF SYSTEMS: Ten point review of systems has been performed and is otherwise negative and/or non-contributory, except as described above.     VITALS  Vitals:    05/08/25 1535   BP: 130/72   BP Location: Left arm   Patient Position: Sitting   Cuff Size: adult   Pulse: 74   SpO2: 96%   Weight: 242 lb (109.8 kg)         PHYSICAL EXAM  CONSTITUTIONAL:  awake, alert, cooperative, no apparent distress, and appears stated age  PSYCH: normal affect  LUNGS: breathing comfortably  CARDIOVASCULAR:  regular rate     Labs/Imaging: Pertinent imaging reviewed.    Overall glucose control:  Lab Results   Component Value Date    A1C 7.4 (H) 03/27/2025    A1C 7.0 (H) 12/02/2024    A1C 6.4 (A) 10/16/2024    A1C 7.4 (H) 08/24/2024    A1C 7.0 (A) 06/04/2024       Assessment & Plan:     ICD-10-CM    1. Type 2 diabetes mellitus with other specified complication, without long-term current use of insulin (HCC)  E11.69       2. Stage 3a chronic kidney disease (HCC)  N18.31       3. Microalbuminuric diabetic nephropathy (HCC)  E11.21       4. Hyperlipidemia associated with type 2 diabetes mellitus (HCC)  E11.69     E78.5       5. Hypertension associated with type 2 diabetes mellitus (HCC)  E11.59     I15.2           Juan James is a pleasant 59 year old male here for evaluation of:    #Type 2 Diabetes  #BMI 32  PMHx of Type 2 diabetes mellitus diagnosed in 2019. Last A1c value was 7.4% done 3/27/2025.    Patient's blood sugars are relatively stable on current regimen. Goal A1C <7%. Discussed Ozempic therapy start given patient's known CKD stage III, would also benefit from cardiovascular standpoint.  He is amenable, but would like me to check in with his primary care doctor first.    Tentative plan:  - Start Ozempic 0.25 mg weekly X 4 weeks, discussed  outcomes of FLOW trial   - Continue Farxiga 10 mg daily  - SGLT2i instructions provided re: surgery, times of illness/dehydration    - met with Hillary BROWN for teaching   -no strict CI For metformin if needed; patient prefers to avoid/wants few medication as possible  -Importance of better glucose control in preventing onset/progression of end-organ damage discussed, as well as goals of therapy and clinical significance of A1C.   - Discussed adding GLP-1 to current medication regimen. Discussed action, risk vs benefit, dosing, and potential side effects. Patient denies hx of pancreatitis, gastroparesis, or personal or family hx of medullary thyroid CA or MEN 2.     -Surveillance for Diabetes Complications & Risks  Foot exam/neuropathy: Last Foot Exam: 09/06/24  -follows with podiatry    Retinopathy screening:   -hx of HTNsive retinopathy 6/2024, no DM retinopathy per optho note  Last Dilated Eye Exam: 06/20/24  Eye Exam shows Diabetic Retinopathy?: No    #CKD stage 3a   #Albuminuria  - taking ARB, SGLT2i  -Continue to follow with nephrology regarding malb elevation ISO CKD/T2DM   Lab Results   Component Value Date    EGFRCR 46 (L) 03/27/2025    MICROALBCREA 1,908.9 (H) 03/27/2025      #Hypertension  - meds as below   BP Readings from Last 1 Encounters:   05/08/25 130/72   BP Meds: amLODIPine Tabs - 10 MG; hydroCHLOROthiazide Tabs - 25 MG; valsartan Tabs - 160 MG     #Hyperlipidemia  -continue follow up with PCP   Lab Results   Component Value Date     (H) 03/27/2025    TRIG 189 (H) 03/27/2025   Cholesterol Meds: rosuvastatin Tabs - 40 MG     Return in about 2 months (around 7/8/2025) for scheduled follow up with Dr. Raymundo.    A total of 42 minutes was spent today on obtaining history, reviewing pertinent labs, evaluating patient, providing multiple treatment options, reinforcing diet/exercise and compliance, and completing documentation.       ALLIE Grey   Endocrinology, Diabetes & Metabolism    5/8/2025    Note to patient: The 21 Century Cures Act makes medical notes like these available to patients in the interest of transparency. However, be advised this is a medical document. It is intended as peer to peer communication. It is written in medical language and may contain abbreviations or verbiage that are unfamiliar. It may appear blunt or direct. Medical documents are intended to carry relevant information, facts as evident, and the clinical opinion of the practitioner.

## 2025-06-02 ENCOUNTER — OFFICE VISIT (OUTPATIENT)
Dept: PODIATRY CLINIC | Facility: CLINIC | Age: 60
End: 2025-06-02

## 2025-06-02 DIAGNOSIS — M20.42 HAMMER TOES OF BOTH FEET: Primary | ICD-10-CM

## 2025-06-02 DIAGNOSIS — M20.11 HALLUX VALGUS OF RIGHT FOOT: ICD-10-CM

## 2025-06-02 DIAGNOSIS — L84 HELOMA DURUM: ICD-10-CM

## 2025-06-02 DIAGNOSIS — E11.42 DIABETIC PERIPHERAL NEUROPATHY (HCC): ICD-10-CM

## 2025-06-02 DIAGNOSIS — B35.1 ONYCHOMYCOSIS: ICD-10-CM

## 2025-06-02 DIAGNOSIS — M20.41 HAMMER TOES OF BOTH FEET: Primary | ICD-10-CM

## 2025-06-02 DIAGNOSIS — S98.132A AMPUTATION OF TOE OF LEFT FOOT: ICD-10-CM

## 2025-06-02 DIAGNOSIS — Z89.422: ICD-10-CM

## 2025-06-02 DIAGNOSIS — M20.42 HAMMER TOE OF LEFT FOOT: ICD-10-CM

## 2025-06-02 PROCEDURE — 99213 OFFICE O/P EST LOW 20 MIN: CPT | Performed by: PODIATRIST

## 2025-06-09 NOTE — PROGRESS NOTES
Juan James is a 59 year old male.   Chief Complaint   Patient presents with    Diabetic Foot Care     Nail care and foot check- - A1C 7.4 on 3/27-LOV Endocrinology Brynn BRANHAM on 5/8         HPI:   Patient returns to the clinic for routine diabetic checkup care he relates no further ulcerations or problems with his feet at this time..  At today's visit reviewed nurse's history as taken above, allergies medications and medical history as documented below.  All changes duly noted  Allergies: Patient has no known allergies.   Current Outpatient Medications   Medication Sig Dispense Refill    Continuous Glucose Sensor (FREESTYLE NIKHIL 3 PLUS SENSOR) Does not apply Misc 1 each As Directed. One sensor every 15 days. 30 day supply. 6 each 1    rosuvastatin 40 MG Oral Tab Take 1 tablet (40 mg total) by mouth nightly. 90 tablet 0    FARXIGA 10 MG Oral Tab       HYDROcodone-acetaminophen 5-325 MG Oral Tab Take 1-2 tablets by mouth every 4 (four) hours as needed for Pain. 20 tablet 0    PEG 3350-KCl-Na Bicarb-NaCl 420 g Oral Recon Soln Take as directed by physician. 4000 mL 0    mupirocin 2 % External Ointment Apply a small amount to the ulcerated site on the fourth toe once daily after bathing and cover with a Band-Aid 30 g 0    amoxicillin clavulanate 875-125 MG Oral Tab Take 1 tablet by mouth 2 (two) times daily. 20 tablet 0    Continuous Glucose  (FREESTYLE NIKHIL 3 READER) Does not apply Misc 1 each As Directed. 1 each 0    hydroCHLOROthiazide 25 MG Oral Tab Take 1 tablet (25 mg total) by mouth daily. 90 tablet 3    amLODIPine 10 MG Oral Tab Take 1 tablet (10 mg total) by mouth daily.      Meloxicam 15 MG Oral Tab Take 1 tablet (15 mg total) by mouth daily.      valsartan 160 MG Oral Tab Take 2 tablets (320 mg total) by mouth daily.        Past Medical History:    Abdominal hernia    Diabetes mellitus (HCC)    Heartburn    High blood pressure    High cholesterol    Neuropathy    feet     Other and unspecified hyperlipidemia    Pain in joints    Renal disorder    Type II or unspecified type diabetes mellitus without mention of complication, not stated as uncontrolled    Unspecified sleep apnea    NO TREATMENT    Visual impairment    glasses    Wears glasses      Past Surgical History:   Procedure Laterality Date    Colonoscopy      Hernia surgery      Other surgical history  06/2023    2nd toe of left foot amputated    Shoulder surg proc unlisted        History reviewed. No pertinent family history.   Social History     Socioeconomic History    Marital status:    Tobacco Use    Smoking status: Former     Types: Cigars, Cigarettes    Smokeless tobacco: Never   Vaping Use    Vaping status: Never Used   Substance and Sexual Activity    Alcohol use: Yes     Alcohol/week: 3.0 standard drinks of alcohol     Types: 3 Cans of beer per week     Comment: social    Drug use: No           REVIEW OF SYSTEMS:   Today reviewed systens as documented below  GENERAL HEALTH: feels well otherwise  SKIN: Refer to exam below  RESPIRATORY: denies shortness of breath with exertion  CARDIOVASCULAR: denies chest pain on exertion  GI: denies abdominal pain and denies heartburn  NEURO: denies headaches    EXAM:   There were no vitals taken for this visit.  GENERAL: well developed, well nourished, in no apparent distress  EXTREMITIES:   1. Integument: Skin on his feet is warm and dry his toenails 1-5 on the right are thickened yellow dystrophic 1, 3 and 5 on the left are similar.  There are no open sores or lesions noted he has surgical scars over his first metatarsal phalangeal joint on the left, where the second toe was amputated on the left and where a partial amputation was performed on the fourth toe of the left.  All areas remain well-healed no sign of infection.   2. Vascular: Patient has palpable pulses   3. Neurologic: Patient has diminished pedal sensorium is lost protective sensation has a history of diabetes  with neuropathy and amputation.   4. Musculoskeletal: Patient has good muscle strength he has amputations as documented above.  Has had a first MTP joint fusion of the left foot.    ASSESSMENT AND PLAN:   Diagnoses and all orders for this visit:    Hammer toes of both feet    Heloma durum    Diabetic peripheral neuropathy (HCC)    Onychomycosis    Hammer toe of left foot    History of amputation of left second toe (HCC)    Hallux valgus of right foot    Amputation of toe of left foot        Plan: Today using a nail nippers were trimmed and debrided toenails as documented above totaling 8 toenails manually and mechanically in girth and width as far down to healthy tissue as possible on both feet.  This was done uneventfully there was no hemorrhage.  There is mild incurvation of the medial and lateral nail borders of the hallux which using a slant back technique were removed and they would not get ingrown.  At today's visit I reminded the patient about daily foot checks  Reminded patient again of proper control of his diabetes to help prevent any severe complications in his feet  Proper foot hygiene moisturizing except between the toes was reviewed  Advised follow-up again every 2 to 3 months for routine care but emphasized to him the importance of coming in sooner if he notices any problems.  Patient's shoes are now about 2 years old dispensed a new prescription to get him a new pair of diabetic shoes and inserts.    The patient indicates understanding of these issues and agrees to the plan.    Porfirio Felix DPM

## 2025-08-11 ENCOUNTER — OFFICE VISIT (OUTPATIENT)
Dept: PODIATRY CLINIC | Facility: CLINIC | Age: 60
End: 2025-08-11

## 2025-08-11 DIAGNOSIS — M20.42 HAMMER TOE OF LEFT FOOT: ICD-10-CM

## 2025-08-11 DIAGNOSIS — E11.42 DIABETIC PERIPHERAL NEUROPATHY (HCC): Primary | ICD-10-CM

## 2025-08-11 DIAGNOSIS — M20.11 HALLUX VALGUS OF RIGHT FOOT: ICD-10-CM

## 2025-08-11 DIAGNOSIS — Z89.422: ICD-10-CM

## 2025-08-11 DIAGNOSIS — L84 HELOMA DURUM: ICD-10-CM

## 2025-08-11 DIAGNOSIS — S98.132A AMPUTATION OF TOE OF LEFT FOOT: ICD-10-CM

## 2025-08-11 DIAGNOSIS — M20.42 HAMMER TOES OF BOTH FEET: ICD-10-CM

## 2025-08-11 DIAGNOSIS — M20.41 HAMMER TOES OF BOTH FEET: ICD-10-CM

## 2025-08-11 DIAGNOSIS — B35.1 ONYCHOMYCOSIS: ICD-10-CM

## 2025-08-11 PROCEDURE — 99213 OFFICE O/P EST LOW 20 MIN: CPT | Performed by: PODIATRIST

## 2025-08-14 ENCOUNTER — LAB ENCOUNTER (OUTPATIENT)
Dept: LAB | Age: 60
End: 2025-08-14
Attending: FAMILY MEDICINE

## 2025-08-14 DIAGNOSIS — E55.9 AVITAMINOSIS D: ICD-10-CM

## 2025-08-14 DIAGNOSIS — Z12.5 SPECIAL SCREENING FOR MALIGNANT NEOPLASM OF PROSTATE: ICD-10-CM

## 2025-08-14 DIAGNOSIS — E11.21 DIABETIC GLOMERULOPATHY (HCC): Primary | ICD-10-CM

## 2025-08-14 DIAGNOSIS — E78.5 HYPERLIPEMIA: ICD-10-CM

## 2025-08-14 LAB
ALBUMIN SERPL-MCNC: 4.4 G/DL (ref 3.2–4.8)
ALBUMIN/GLOB SERPL: 1.4 (ref 1–2)
ALP LIVER SERPL-CCNC: 63 U/L (ref 45–117)
ALT SERPL-CCNC: 23 U/L (ref 10–49)
ANION GAP SERPL CALC-SCNC: 9 MMOL/L (ref 0–18)
AST SERPL-CCNC: 24 U/L (ref ?–34)
BILIRUB SERPL-MCNC: 0.5 MG/DL (ref 0.2–1.1)
BUN BLD-MCNC: 40 MG/DL (ref 9–23)
CALCIUM BLD-MCNC: 9.3 MG/DL (ref 8.7–10.6)
CHLORIDE SERPL-SCNC: 108 MMOL/L (ref 98–112)
CHOLEST SERPL-MCNC: 221 MG/DL (ref ?–200)
CO2 SERPL-SCNC: 23 MMOL/L (ref 21–32)
COMPLEXED PSA SERPL-MCNC: 0.76 NG/ML (ref ?–4)
CREAT BLD-MCNC: 1.67 MG/DL (ref 0.7–1.3)
EGFRCR SERPLBLD CKD-EPI 2021: 47 ML/MIN/1.73M2 (ref 60–?)
EST. AVERAGE GLUCOSE BLD GHB EST-MCNC: 203 MG/DL (ref 68–126)
FASTING PATIENT LIPID ANSWER: YES
FASTING STATUS PATIENT QL REPORTED: YES
GLOBULIN PLAS-MCNC: 3.1 G/DL (ref 2–3.5)
GLUCOSE BLD-MCNC: 133 MG/DL (ref 70–99)
HBA1C MFR BLD: 8.7 % (ref ?–5.7)
HDLC SERPL-MCNC: 34 MG/DL (ref 40–59)
LDLC SERPL CALC-MCNC: 130 MG/DL (ref ?–100)
NONHDLC SERPL-MCNC: 187 MG/DL (ref ?–130)
OSMOLALITY SERPL CALC.SUM OF ELEC: 302 MOSM/KG (ref 275–295)
POTASSIUM SERPL-SCNC: 5.6 MMOL/L (ref 3.5–5.1)
PROT SERPL-MCNC: 7.5 G/DL (ref 5.7–8.2)
SODIUM SERPL-SCNC: 140 MMOL/L (ref 136–145)
TRIGL SERPL-MCNC: 319 MG/DL (ref 30–149)
VIT D+METAB SERPL-MCNC: 25.3 NG/ML (ref 30–100)
VLDLC SERPL CALC-MCNC: 59 MG/DL (ref 0–30)

## 2025-08-14 PROCEDURE — 83036 HEMOGLOBIN GLYCOSYLATED A1C: CPT

## 2025-08-14 PROCEDURE — 80061 LIPID PANEL: CPT

## 2025-08-14 PROCEDURE — 36415 COLL VENOUS BLD VENIPUNCTURE: CPT

## 2025-08-14 PROCEDURE — 82306 VITAMIN D 25 HYDROXY: CPT

## 2025-08-14 PROCEDURE — 80053 COMPREHEN METABOLIC PANEL: CPT

## (undated) DEVICE — Device

## (undated) DEVICE — SUTURE VCRL SZ 2-0 L27IN ABSRB VLT L26MM SH

## (undated) DEVICE — 450 ML BOTTLE OF 0.05% CHLORHEXIDINE GLUCONATE IN 99.95% STERILE WATER FOR IRRIGATION, USP AND APPLICATOR.: Brand: IRRISEPT ANTIMICROBIAL WOUND LAVAGE

## (undated) DEVICE — DISPOSABLE TOURNIQUET CUFF SINGLE BLADDER, DUAL PORT AND QUICK CONNECT CONNECTOR: Brand: COLOR CUFF

## (undated) DEVICE — GOWN SURG XL REINF SMS LEV 3 BLU SIRUS SET

## (undated) DEVICE — SUTURE VCRL SZ 3-0 L27IN ABSRB UD L19MM FS-2

## (undated) DEVICE — BLADE SAW KM33-11

## (undated) DEVICE — STOCKINETTE HYDROMED 6" 706044

## (undated) DEVICE — PAD,ABDOMINAL,8"X7.5",ST,LF,20/BX: Brand: MEDLINE INDUSTRIES, INC.

## (undated) DEVICE — DRESSING PETRO W3XL3IN OIL EMUL N ADH GZ KNIT

## (undated) DEVICE — STERILE SYNTHETIC POLYISOPRENE POWDER-FREE SURGICAL GLOVES WITH HYDROGEL COATING, SMOOTH FINISH, STRAIGHT FINGER: Brand: PROTEXIS

## (undated) DEVICE — BANDAGE,GAUZE,CONFORMING,4"X75",STRL,LF: Brand: MEDLINE

## (undated) DEVICE — SPECIMAN CONTAINER 4OZ STERILE

## (undated) DEVICE — MEGADYNE ELECTRODE ADULT PT RT

## (undated) DEVICE — PACK PBDS LOWER EXTREMITY

## (undated) DEVICE — SUTURE ETHLN SZ 3-0 L18IN NONABSORBABLE BLK

## (undated) DEVICE — SOLUTION IRRIG 1000ML 0.9% NACL USP BTL

## (undated) DEVICE — LOANER FEE FOOT AND ANKLE

## (undated) DEVICE — LOWER EXTREMITY CDS-LF: Brand: MEDLINE INDUSTRIES, INC.

## (undated) DEVICE — SLEEVE KENDALL SCD EXPRESS MED

## (undated) DEVICE — SUTURE ETHLN SZ 4-0 L18IN NABSRB BLK L19MM

## (undated) DEVICE — SOL NACL IRRIG 0.9% 1000ML BTL

## (undated) DEVICE — PREMIUM WET SKIN PREP TRAY: Brand: MEDLINE INDUSTRIES, INC.

## (undated) DEVICE — SLEEVE COMPR M KNEE LEN SGL USE KENDALL SCD

## (undated) DEVICE — SUTURE ETHILON 3-0 FSL

## (undated) DEVICE — SYRINGE MED 50ML LL TIP DISP

## (undated) DEVICE — BLADE #15 STRL STAINLESS

## (undated) DEVICE — OCCLUSIVE GAUZE STRIP OVERWRAP,3% BISMUTH TRIBROMOPHENATE IN PETROLATUM BLEND: Brand: XEROFORM

## (undated) DEVICE — SURGICAL GLOVE PI ORTHO 7.5

## (undated) DEVICE — SUTURE VCRL SZ 3-0 L18IN ABSRB UD W/O NDL

## (undated) DEVICE — CONTAINER SPEC 4OZ POLYPR GRAD LEAK RESIST

## (undated) NOTE — Clinical Note
ELZBIETA, you're seeing him on 10/16, he is very overwhelmed by all the input he's getting regarding DM management from many sources.  I advised him to try not to google as much, and to remember his diabetes is his own journey.  We had much more of a MH conversation than diet as he has the basics down VERY well, just needs to use his CGM to his benefit, currently see's it as more of a burden because he still has the 2, but should be upgraded to the 3 by the time he see's you

## (undated) NOTE — LETTER
5/2/2024          To Whom It May Concern:    Juan Devin James is currently under my medical care and may not return to work at this time.    Please excuse Juan for 1 month.  .      If you require additional information please contact our office.        Sincerely,    Porfirio Felix DPM

## (undated) NOTE — LETTER
11/13/2023          To Whom It May Concern:    Bushra Pacheco is currently under my medical care and may  return to work at this time. He may return to work on 11/14/2023. Activity is restricted as follows: none. If you require additional information please contact our office.         Sincerely,    Beatriz Brooks DPM

## (undated) NOTE — LETTER
4/19/2024          To Whom It May Concern:    Juan James is currently under my medical care and may not return to work at this time. He will be seen in office on May 2, 2024. He will be reassessed on that date and an update will be provided at that time.    If you require additional information please contact our office.      Sincerely,    Porfirio Felix DPM

## (undated) NOTE — LETTER
6/26/2024          To Whom It May Concern:    Juan James is currently under my medical care and may  return to work at this time.    Please excuse Juan until 7/15/2024.  He may return to work on 7/16/2024 pending follow up appointment.  If you require additional information please contact our office.        Sincerely,    Porfirio Felix DPM

## (undated) NOTE — LETTER
11/21/2023          To Whom It May Concern:    Shaheed Romo is currently under my medical care and may  return to work at this time. He may return to work on 11/24/2023. Activity is restricted as follows:Please allow Lavern Duval to use the cart while at work permanently. If you require additional information please contact our office.         Sincerely,    Camelia Bustillos DPM

## (undated) NOTE — LETTER
8/21/2023          To Whom It May Concern:    Olga Neff is currently under my medical care and may return to work on 08/22/23. Activity is restricted as follows: No Restrictions . If you require additional information please contact our office.         Sincerely,    Andrey Ahumada, DPM

## (undated) NOTE — LETTER
7/15/2024          To Whom It May Concern:    Juan James is currently under my medical care and may return to work at this time.    He may return to work on tomorrow.  Activity is restricted as follows: none.    If you require additional information please contact our office.        Sincerely,    Porfirio Felix DPM

## (undated) NOTE — LETTER
CLARIFICATION FOR E-SSS    To: Dr Leonidas Yu,     Patient Name: Yamini Romero / Sex: 6/13/1965-A: 62 y  male   Medical Records: [id number] CSN: [id number]      Procedure Description:  Total amputation fourth toe left foot    Please note that clarification is needed on the Electronic-Surgery Scheduling Sheet (E-SSS)  the original is included with this letter. Please have physician review and make changes on the faxed copy of the E-SSS. Procedure per patient should be Partial amputation fourth toe left foot        Please review and submit change if needed        ALL CHANGES MUST BE DOCUMENTED ON THE E-SSS AND SIGNED BY THE PHYSICIAN    After physician has made the changes and signed the E-SSS please fax it to [phone] and these changes will then be made in Epic by the OR schedulers.  Or manually fax to [phone]      If you have any questions please call Pre-Admission Testing at [phone]    Thank you

## (undated) NOTE — LETTER
Patient Name: Juan James        : 1965       Medical Record #: [id number]    CONSENT FOR PROCEDURES/SEDATION    Date: 8/15/2024       Time: 9:58 AM        1. I authorize the performance upon Juan James the following:    Image guided renal biopsy    2. I authorize Dr. Dickey (and whomever is designated as the doctor’s assistant), to perform the above mentioned procedures.    3. If any unforeseen conditions arise during this procedure calling for additional procedures, operations, or medications (including anesthesia and blood transfusion), I  further request and authorize the doctor to do whatever he/she deems advisable in my interest.    4. I consent to the taking and reproduction of any photographs in the course of this procedure for professional purposes.    5. I consent to the administration of such sedation as may be considered necessary or advisable by the physician responsible for this service, with the exception of  _____________________________.    6. I have been informed by my doctor of the nature and purpose of this procedure/sedation, possible alternative methods of treatment, risk involved and possible complications.      Signature of Patient:  ___________________________    Signature of person authorized to consent for patient: Relationship to patient:  ___________________________    ___________________    Witness: ____________________     Date: ______________    Provider: ____________________     Date: ______________

## (undated) NOTE — Clinical Note
Thank you so much for the referral. Patient seen for diabetes education today, please refer to note for specifics. Follow up indicated for classes.

## (undated) NOTE — LETTER
10/30/2023          To Whom It May Concern:    Anna Weems is currently under my medical care and may return to work at this time. Activity is restricted as follows:Sitting Duties ONLY. If you require additional information please contact our office.         Sincerely,    Paloma Clifton DPM

## (undated) NOTE — Clinical Note
Marino Kenny!  I am an APN with endocrinology, I saw our mutual patient Ricci today.  I recommended that he start Ozempic for glycemic control benefit as well as CKD indication given the recent outcomes of the FLOW trial.  There are no strict contraindications.  He wanted your approval of this plan before starting.  Are you amenable to this? If so I will order. Thanks! -ALLIE Meneses

## (undated) NOTE — LETTER
FAX REGARDING HISTORY AND PHYSICALS  Patient Name: Juan James CSN: [id number]   MRN: [id number]  : 1965 - A: 58 y    Surgeon(s):  Porfirio Felix DPM  Consent Procedure: metatarsal phalangeal joint fusion left foot with plate and screw fixation, fusion of the second toe with pin fixation left foot  Anesthesia Type: MAC    The patient listed above is coming in for a surgical/procedural case.  An H&P is needed within 30 days with an update note within 7 days of the surgery/procedure. It is the ordering physician’s responsibility to do the H&P or make arrangements with the PCP to have the H&P completed and faxed to Pre-Admission Testing at [phone].    Surgery Date: 2024    Acceptable History & Physicals are:     (a) Done within 30 days of the surgery/procedure, with an update note within 7 days of the surgery/procedure.                                                                                                               OR    (b) Done within 7 days of the surgery/procedure.      Thank you.

## (undated) NOTE — ED AVS SNAPSHOT
Dimitry Monahan   MRN: [id number]    Department:  [facility] Emergency Department   Date of Visit:  5/14/2019           Disclosure     Insurance plans vary and the physician(s) referred by the ER may not be covered by your plan.  Please contact your tell this physician (or your personal doctor if your instructions are to return to your personal doctor) about any new or lasting problems. The primary care or specialist physician will see patients referred from the [facility] Emergency Department.  Fernanda Kelly

## (undated) NOTE — LETTER
7/15/2024          To Whom It May Concern:    Juan James is currently under my medical care and may  return to work at this time.      He may return to work on 07/16/24.  Activity is restricted as follows: none.    If you require additional information please contact our office.        Sincerely,    Porfirio Felix DPM

## (undated) NOTE — LETTER
8/7/2023          To Whom It May Concern:    Iris Marin is currently under my medical care and may  return to work at this time. Activity is restricted as follows: light duty every 4 hours for 2 to 3 weeks . If you require additional information please contact our office.         Sincerely,    Landy Zendejas DPM

## (undated) NOTE — LETTER
10/23/2023          To Whom It May Concern:    Isabela Chicas is currently under my medical care and may not return to work at this time. Please excuse Soraya De La Cruz for 7 days. He may return to work on 10/31/2023. If you require additional information please contact our office.         Sincerely,    Hermila Juarez DPM

## (undated) NOTE — LETTER
6/4/2024          To Whom It May Concern:    Juan James is currently under my medical care and may not return to work at this time.    Please excuse Juan until 6/24/2024 pending his Post op appointment.        If you require additional information please contact our office.        Sincerely,    Porfirio Felix DPM